# Patient Record
Sex: MALE | Race: WHITE | Employment: OTHER | URBAN - METROPOLITAN AREA
[De-identification: names, ages, dates, MRNs, and addresses within clinical notes are randomized per-mention and may not be internally consistent; named-entity substitution may affect disease eponyms.]

---

## 2017-03-09 ENCOUNTER — OFFICE VISIT (OUTPATIENT)
Dept: CARDIOLOGY CLINIC | Age: 82
End: 2017-03-09

## 2017-03-09 VITALS
SYSTOLIC BLOOD PRESSURE: 138 MMHG | BODY MASS INDEX: 34.19 KG/M2 | HEIGHT: 73 IN | HEART RATE: 49 BPM | WEIGHT: 258 LBS | DIASTOLIC BLOOD PRESSURE: 74 MMHG

## 2017-03-09 DIAGNOSIS — I35.1 NONRHEUMATIC AORTIC VALVE INSUFFICIENCY: ICD-10-CM

## 2017-03-09 DIAGNOSIS — I10 ESSENTIAL HYPERTENSION: ICD-10-CM

## 2017-03-09 DIAGNOSIS — I25.10 CORONARY ARTERY DISEASE INVOLVING NATIVE CORONARY ARTERY OF NATIVE HEART WITHOUT ANGINA PECTORIS: Primary | ICD-10-CM

## 2017-03-09 PROCEDURE — G8417 CALC BMI ABV UP PARAM F/U: HCPCS | Performed by: INTERNAL MEDICINE

## 2017-03-09 PROCEDURE — 4040F PNEUMOC VAC/ADMIN/RCVD: CPT | Performed by: INTERNAL MEDICINE

## 2017-03-09 PROCEDURE — 99214 OFFICE O/P EST MOD 30 MIN: CPT | Performed by: INTERNAL MEDICINE

## 2017-03-09 PROCEDURE — 1036F TOBACCO NON-USER: CPT | Performed by: INTERNAL MEDICINE

## 2017-03-09 PROCEDURE — 1123F ACP DISCUSS/DSCN MKR DOCD: CPT | Performed by: INTERNAL MEDICINE

## 2017-03-09 PROCEDURE — G8598 ASA/ANTIPLAT THER USED: HCPCS | Performed by: INTERNAL MEDICINE

## 2017-03-09 PROCEDURE — G8484 FLU IMMUNIZE NO ADMIN: HCPCS | Performed by: INTERNAL MEDICINE

## 2017-03-09 PROCEDURE — G8427 DOCREV CUR MEDS BY ELIG CLIN: HCPCS | Performed by: INTERNAL MEDICINE

## 2017-03-09 RX ORDER — DOXYCYCLINE HYCLATE 100 MG/1
CAPSULE ORAL
Refills: 2 | COMMUNITY
Start: 2017-02-25 | End: 2018-11-29 | Stop reason: ALTCHOICE

## 2017-03-16 ENCOUNTER — OFFICE VISIT (OUTPATIENT)
Dept: INTERNAL MEDICINE CLINIC | Age: 82
End: 2017-03-16

## 2017-03-16 VITALS
WEIGHT: 257 LBS | HEIGHT: 73 IN | BODY MASS INDEX: 34.06 KG/M2 | SYSTOLIC BLOOD PRESSURE: 126 MMHG | DIASTOLIC BLOOD PRESSURE: 84 MMHG | HEART RATE: 72 BPM

## 2017-03-16 DIAGNOSIS — I25.10 CORONARY ARTERY DISEASE INVOLVING NATIVE CORONARY ARTERY OF NATIVE HEART WITHOUT ANGINA PECTORIS: ICD-10-CM

## 2017-03-16 DIAGNOSIS — C61 MALIGNANT NEOPLASM OF PROSTATE (HCC): ICD-10-CM

## 2017-03-16 DIAGNOSIS — I10 ESSENTIAL HYPERTENSION: Primary | ICD-10-CM

## 2017-03-16 DIAGNOSIS — E78.00 PURE HYPERCHOLESTEROLEMIA: ICD-10-CM

## 2017-03-16 PROCEDURE — 1036F TOBACCO NON-USER: CPT | Performed by: NURSE PRACTITIONER

## 2017-03-16 PROCEDURE — 4040F PNEUMOC VAC/ADMIN/RCVD: CPT | Performed by: NURSE PRACTITIONER

## 2017-03-16 PROCEDURE — G8484 FLU IMMUNIZE NO ADMIN: HCPCS | Performed by: NURSE PRACTITIONER

## 2017-03-16 PROCEDURE — 1123F ACP DISCUSS/DSCN MKR DOCD: CPT | Performed by: NURSE PRACTITIONER

## 2017-03-16 PROCEDURE — G8427 DOCREV CUR MEDS BY ELIG CLIN: HCPCS | Performed by: NURSE PRACTITIONER

## 2017-03-16 PROCEDURE — G8598 ASA/ANTIPLAT THER USED: HCPCS | Performed by: NURSE PRACTITIONER

## 2017-03-16 PROCEDURE — 99214 OFFICE O/P EST MOD 30 MIN: CPT | Performed by: NURSE PRACTITIONER

## 2017-03-16 PROCEDURE — G8417 CALC BMI ABV UP PARAM F/U: HCPCS | Performed by: NURSE PRACTITIONER

## 2017-07-14 RX ORDER — FUROSEMIDE 20 MG/1
TABLET ORAL
Qty: 90 TABLET | Refills: 0 | Status: SHIPPED | OUTPATIENT
Start: 2017-07-14 | End: 2017-10-13 | Stop reason: SDUPTHER

## 2017-07-31 RX ORDER — POTASSIUM CHLORIDE 750 MG/1
CAPSULE, EXTENDED RELEASE ORAL
Qty: 90 CAPSULE | Refills: 0 | Status: SHIPPED | OUTPATIENT
Start: 2017-07-31 | End: 2017-10-27 | Stop reason: SDUPTHER

## 2017-08-10 RX ORDER — LEVOTHYROXINE SODIUM 0.05 MG/1
TABLET ORAL
Qty: 90 TABLET | Refills: 0 | Status: SHIPPED | OUTPATIENT
Start: 2017-08-10 | End: 2017-11-08 | Stop reason: SDUPTHER

## 2017-08-23 ENCOUNTER — OFFICE VISIT (OUTPATIENT)
Dept: INTERNAL MEDICINE CLINIC | Age: 82
End: 2017-08-23

## 2017-08-23 VITALS
HEIGHT: 73 IN | WEIGHT: 244 LBS | SYSTOLIC BLOOD PRESSURE: 130 MMHG | DIASTOLIC BLOOD PRESSURE: 82 MMHG | HEART RATE: 56 BPM | BODY MASS INDEX: 32.34 KG/M2

## 2017-08-23 DIAGNOSIS — Z86.39 HISTORY OF HYPOKALEMIA: ICD-10-CM

## 2017-08-23 DIAGNOSIS — R25.1 TREMOR: ICD-10-CM

## 2017-08-23 DIAGNOSIS — I25.10 CORONARY ARTERY DISEASE INVOLVING NATIVE CORONARY ARTERY OF NATIVE HEART WITHOUT ANGINA PECTORIS: ICD-10-CM

## 2017-08-23 DIAGNOSIS — I48.0 PAF (PAROXYSMAL ATRIAL FIBRILLATION) (HCC): ICD-10-CM

## 2017-08-23 DIAGNOSIS — I10 ESSENTIAL HYPERTENSION: ICD-10-CM

## 2017-08-23 DIAGNOSIS — G47.62 NOCTURNAL LEG CRAMPS: Primary | ICD-10-CM

## 2017-08-23 LAB
A/G RATIO: 2.2 (ref 1.1–2.2)
ALBUMIN SERPL-MCNC: 4.3 G/DL (ref 3.4–5)
ALP BLD-CCNC: 49 U/L (ref 40–129)
ALT SERPL-CCNC: 18 U/L (ref 10–40)
ANION GAP SERPL CALCULATED.3IONS-SCNC: 13 MMOL/L (ref 3–16)
AST SERPL-CCNC: 19 U/L (ref 15–37)
BASOPHILS ABSOLUTE: 0 K/UL (ref 0–0.2)
BASOPHILS RELATIVE PERCENT: 0.6 %
BILIRUB SERPL-MCNC: 0.6 MG/DL (ref 0–1)
BUN BLDV-MCNC: 25 MG/DL (ref 7–20)
CALCIUM SERPL-MCNC: 9.3 MG/DL (ref 8.3–10.6)
CHLORIDE BLD-SCNC: 104 MMOL/L (ref 99–110)
CHOLESTEROL, TOTAL: 114 MG/DL (ref 0–199)
CO2: 27 MMOL/L (ref 21–32)
CREAT SERPL-MCNC: 1.1 MG/DL (ref 0.8–1.3)
EOSINOPHILS ABSOLUTE: 0.2 K/UL (ref 0–0.6)
EOSINOPHILS RELATIVE PERCENT: 2.6 %
GFR AFRICAN AMERICAN: >60
GFR NON-AFRICAN AMERICAN: >60
GLOBULIN: 2 G/DL
GLUCOSE BLD-MCNC: 92 MG/DL (ref 70–99)
HCT VFR BLD CALC: 47.8 % (ref 40.5–52.5)
HDLC SERPL-MCNC: 31 MG/DL (ref 40–60)
HEMOGLOBIN: 15.7 G/DL (ref 13.5–17.5)
LDL CHOLESTEROL CALCULATED: 45 MG/DL
LYMPHOCYTES ABSOLUTE: 1.7 K/UL (ref 1–5.1)
LYMPHOCYTES RELATIVE PERCENT: 23.3 %
MCH RBC QN AUTO: 30 PG (ref 26–34)
MCHC RBC AUTO-ENTMCNC: 32.9 G/DL (ref 31–36)
MCV RBC AUTO: 91.3 FL (ref 80–100)
MONOCYTES ABSOLUTE: 0.8 K/UL (ref 0–1.3)
MONOCYTES RELATIVE PERCENT: 10.4 %
NEUTROPHILS ABSOLUTE: 4.7 K/UL (ref 1.7–7.7)
NEUTROPHILS RELATIVE PERCENT: 63.1 %
PDW BLD-RTO: 14.3 % (ref 12.4–15.4)
PLATELET # BLD: 125 K/UL (ref 135–450)
PMV BLD AUTO: 8.1 FL (ref 5–10.5)
POTASSIUM SERPL-SCNC: 4.2 MMOL/L (ref 3.5–5.1)
RBC # BLD: 5.24 M/UL (ref 4.2–5.9)
SODIUM BLD-SCNC: 144 MMOL/L (ref 136–145)
TOTAL PROTEIN: 6.3 G/DL (ref 6.4–8.2)
TRIGL SERPL-MCNC: 190 MG/DL (ref 0–150)
VLDLC SERPL CALC-MCNC: 38 MG/DL
WBC # BLD: 7.5 K/UL (ref 4–11)

## 2017-08-23 PROCEDURE — 1036F TOBACCO NON-USER: CPT | Performed by: NURSE PRACTITIONER

## 2017-08-23 PROCEDURE — G8598 ASA/ANTIPLAT THER USED: HCPCS | Performed by: NURSE PRACTITIONER

## 2017-08-23 PROCEDURE — G8417 CALC BMI ABV UP PARAM F/U: HCPCS | Performed by: NURSE PRACTITIONER

## 2017-08-23 PROCEDURE — 99214 OFFICE O/P EST MOD 30 MIN: CPT | Performed by: NURSE PRACTITIONER

## 2017-08-23 PROCEDURE — 4040F PNEUMOC VAC/ADMIN/RCVD: CPT | Performed by: NURSE PRACTITIONER

## 2017-08-23 PROCEDURE — G8427 DOCREV CUR MEDS BY ELIG CLIN: HCPCS | Performed by: NURSE PRACTITIONER

## 2017-08-23 PROCEDURE — 1123F ACP DISCUSS/DSCN MKR DOCD: CPT | Performed by: NURSE PRACTITIONER

## 2017-09-07 RX ORDER — RIVASTIGMINE TARTRATE 1.5 MG/1
CAPSULE ORAL
Qty: 180 CAPSULE | Refills: 0 | Status: SHIPPED | OUTPATIENT
Start: 2017-09-07 | End: 2018-11-29 | Stop reason: ALTCHOICE

## 2017-09-15 ENCOUNTER — OFFICE VISIT (OUTPATIENT)
Dept: CARDIOLOGY CLINIC | Age: 82
End: 2017-09-15

## 2017-09-15 VITALS
HEIGHT: 73 IN | HEART RATE: 84 BPM | BODY MASS INDEX: 32.47 KG/M2 | DIASTOLIC BLOOD PRESSURE: 86 MMHG | WEIGHT: 245 LBS | SYSTOLIC BLOOD PRESSURE: 124 MMHG

## 2017-09-15 DIAGNOSIS — E66.9 OBESITY (BMI 30-39.9): ICD-10-CM

## 2017-09-15 DIAGNOSIS — I35.1 NONRHEUMATIC AORTIC VALVE INSUFFICIENCY: ICD-10-CM

## 2017-09-15 DIAGNOSIS — I10 ESSENTIAL HYPERTENSION: ICD-10-CM

## 2017-09-15 DIAGNOSIS — I25.10 CORONARY ARTERY DISEASE INVOLVING NATIVE CORONARY ARTERY OF NATIVE HEART WITHOUT ANGINA PECTORIS: Primary | ICD-10-CM

## 2017-09-15 PROCEDURE — 99214 OFFICE O/P EST MOD 30 MIN: CPT | Performed by: INTERNAL MEDICINE

## 2017-09-15 PROCEDURE — G8598 ASA/ANTIPLAT THER USED: HCPCS | Performed by: INTERNAL MEDICINE

## 2017-09-15 PROCEDURE — G8427 DOCREV CUR MEDS BY ELIG CLIN: HCPCS | Performed by: INTERNAL MEDICINE

## 2017-09-15 PROCEDURE — 1123F ACP DISCUSS/DSCN MKR DOCD: CPT | Performed by: INTERNAL MEDICINE

## 2017-09-15 PROCEDURE — 1036F TOBACCO NON-USER: CPT | Performed by: INTERNAL MEDICINE

## 2017-09-15 PROCEDURE — G8417 CALC BMI ABV UP PARAM F/U: HCPCS | Performed by: INTERNAL MEDICINE

## 2017-09-15 PROCEDURE — 4040F PNEUMOC VAC/ADMIN/RCVD: CPT | Performed by: INTERNAL MEDICINE

## 2017-09-21 ENCOUNTER — IMMUNIZATION (OUTPATIENT)
Dept: INTERNAL MEDICINE CLINIC | Age: 82
End: 2017-09-21

## 2017-09-21 DIAGNOSIS — Z23 NEED FOR INFLUENZA VACCINATION: Primary | ICD-10-CM

## 2017-09-21 PROCEDURE — 90662 IIV NO PRSV INCREASED AG IM: CPT | Performed by: NURSE PRACTITIONER

## 2017-09-21 PROCEDURE — G0008 ADMIN INFLUENZA VIRUS VAC: HCPCS | Performed by: NURSE PRACTITIONER

## 2017-10-16 RX ORDER — FUROSEMIDE 20 MG/1
TABLET ORAL
Qty: 90 TABLET | Refills: 0 | Status: SHIPPED | OUTPATIENT
Start: 2017-10-16 | End: 2018-02-02 | Stop reason: SDUPTHER

## 2017-10-27 RX ORDER — POTASSIUM CHLORIDE 750 MG/1
CAPSULE, EXTENDED RELEASE ORAL
Qty: 90 CAPSULE | Refills: 0 | Status: SHIPPED | OUTPATIENT
Start: 2017-10-27 | End: 2018-01-24 | Stop reason: SDUPTHER

## 2017-11-08 ENCOUNTER — OFFICE VISIT (OUTPATIENT)
Dept: INTERNAL MEDICINE CLINIC | Age: 82
End: 2017-11-08

## 2017-11-08 VITALS
SYSTOLIC BLOOD PRESSURE: 132 MMHG | HEART RATE: 52 BPM | HEIGHT: 73 IN | DIASTOLIC BLOOD PRESSURE: 80 MMHG | BODY MASS INDEX: 33 KG/M2 | WEIGHT: 249 LBS

## 2017-11-08 DIAGNOSIS — I10 ESSENTIAL HYPERTENSION: ICD-10-CM

## 2017-11-08 DIAGNOSIS — C61 MALIGNANT NEOPLASM OF PROSTATE (HCC): ICD-10-CM

## 2017-11-08 DIAGNOSIS — R53.83 FATIGUE, UNSPECIFIED TYPE: Primary | ICD-10-CM

## 2017-11-08 DIAGNOSIS — E55.9 VITAMIN D DEFICIENCY: ICD-10-CM

## 2017-11-08 DIAGNOSIS — Z23 NEED FOR PNEUMOCOCCAL VACCINATION: ICD-10-CM

## 2017-11-08 DIAGNOSIS — F03.90 DEMENTIA WITHOUT BEHAVIORAL DISTURBANCE, UNSPECIFIED DEMENTIA TYPE: ICD-10-CM

## 2017-11-08 LAB
FOLATE: >20 NG/ML (ref 4.78–24.2)
PROSTATE SPECIFIC ANTIGEN: 0.48 NG/ML (ref 0–4)
TSH SERPL DL<=0.05 MIU/L-ACNC: 3.13 UIU/ML (ref 0.27–4.2)
VITAMIN B-12: 855 PG/ML (ref 211–911)
VITAMIN D 25-HYDROXY: 27.1 NG/ML

## 2017-11-08 PROCEDURE — 4040F PNEUMOC VAC/ADMIN/RCVD: CPT | Performed by: NURSE PRACTITIONER

## 2017-11-08 PROCEDURE — 1036F TOBACCO NON-USER: CPT | Performed by: NURSE PRACTITIONER

## 2017-11-08 PROCEDURE — G0009 ADMIN PNEUMOCOCCAL VACCINE: HCPCS | Performed by: NURSE PRACTITIONER

## 2017-11-08 PROCEDURE — G8417 CALC BMI ABV UP PARAM F/U: HCPCS | Performed by: NURSE PRACTITIONER

## 2017-11-08 PROCEDURE — G8598 ASA/ANTIPLAT THER USED: HCPCS | Performed by: NURSE PRACTITIONER

## 2017-11-08 PROCEDURE — 1123F ACP DISCUSS/DSCN MKR DOCD: CPT | Performed by: NURSE PRACTITIONER

## 2017-11-08 PROCEDURE — G8427 DOCREV CUR MEDS BY ELIG CLIN: HCPCS | Performed by: NURSE PRACTITIONER

## 2017-11-08 PROCEDURE — 90732 PPSV23 VACC 2 YRS+ SUBQ/IM: CPT | Performed by: NURSE PRACTITIONER

## 2017-11-08 PROCEDURE — 99214 OFFICE O/P EST MOD 30 MIN: CPT | Performed by: NURSE PRACTITIONER

## 2017-11-08 PROCEDURE — G8484 FLU IMMUNIZE NO ADMIN: HCPCS | Performed by: NURSE PRACTITIONER

## 2017-11-08 RX ORDER — DONEPEZIL HYDROCHLORIDE 5 MG/1
5 TABLET, FILM COATED ORAL NIGHTLY
Qty: 90 TABLET | Refills: 3 | Status: SHIPPED | OUTPATIENT
Start: 2017-11-08 | End: 2018-11-29 | Stop reason: SDUPTHER

## 2017-11-09 RX ORDER — LEVOTHYROXINE SODIUM 0.05 MG/1
TABLET ORAL
Qty: 90 TABLET | Refills: 0 | Status: SHIPPED | OUTPATIENT
Start: 2017-11-09 | End: 2018-02-04 | Stop reason: SDUPTHER

## 2017-11-09 NOTE — PROGRESS NOTES
Subjective:      Patient ID: Juarez Brown is a 80 y.o. male. CC: Follow-up chronic medication conditions, hypertension, CAD     HPI: The patient present to the office today for follow-up of chronic medical conditions. He has no acute complaints today and he generally feels good. He has history of hypertension, coronary disease, high cholesterol. He denies any chest pain. He is taking his medication as directed. He has a history of tremor. He uses a beta blocker for this. He questions his previous diagnosis of memory impairment. He feels this was related to his heart attack and not a chronic memory issue. We discussed this but he is going to continue Exelon patches for now. He received a letter from his insurance company that Exelon patches will be switched to tier 4. He is interested in alternative.       Past Medical History:   Diagnosis Date    Bilateral cataracts     Bladder stones     Blepharitis     Cancer ([facility] [address]     prostate    Hypertension     Kidney stones     with bladder stones    Obesity     Pneumonia     SOB (shortness of breath)        Current Outpatient Prescriptions   Medication Sig Dispense Refill    donepezil (ARICEPT) 5 MG tablet Take 1 tablet by mouth nightly 90 tablet 3    potassium chloride (MICRO-K) 10 MEQ extended release capsule TAKE 1 CAPSULE BY MOUTH DAILY 90 capsule 0    furosemide (LASIX) 20 MG tablet TAKE 1 TABLET BY MOUTH EVERY DAY 90 tablet 0    rivastigmine (EXELON) 1.5 MG capsule TAKE 1 CAPSULE BY MOUTH TWICE DAILY 180 capsule 0    doxycycline hyclate (VIBRAMYCIN) 100 MG capsule TK 1 C PO BID  2    propranolol (INDERAL) 20 MG tablet Take 1 tablet by mouth 2 times daily (Patient taking differently: Take 40 mg by mouth 2 times daily ) 180 tablet 3    tobramycin-dexamethasone (TOBRADEX) 0.3-0.1 % ophthalmic ointment 3 times daily 3 times daily.  Coenzyme Q10 (CO Q 10 PO) Take  by mouth.       aspirin 81 MG tablet Take 81 mg by mouth request  -     donepezil (ARICEPT) 5 MG tablet;  Take 1 tablet by mouth nightly  -     VITAMIN B12 & FOLATE  -     Vitamin D 25 Hydroxy    Malignant neoplasm of prostate ([address]  - He wants to continue these.  -     PSA, Prostatic Specific Antigen    Vitamin D deficiency   -     Vitamin D 25 Hydroxy    Need for pneumococcal vaccination  -     Pneumococcal polysaccharide vaccine 23-valent >= 3yo subcutaneous/IM (PNEUMOVAX 23)    Follow-up 4 months      Larissa Fairbanks, CNP

## 2017-11-13 DIAGNOSIS — E55.9 VITAMIN D DEFICIENCY: Primary | ICD-10-CM

## 2018-01-11 RX ORDER — PROPRANOLOL HYDROCHLORIDE 20 MG/1
TABLET ORAL
Qty: 180 TABLET | Refills: 0 | Status: SHIPPED | OUTPATIENT
Start: 2018-01-11 | End: 2018-03-08 | Stop reason: SDUPTHER

## 2018-01-16 ENCOUNTER — TELEPHONE (OUTPATIENT)
Dept: CARDIOLOGY CLINIC | Age: 83
End: 2018-01-16

## 2018-01-16 NOTE — TELEPHONE ENCOUNTER
Last seen 9/15/17. Hx of CAD with abnormal stress test.  Refuses LHC. Patient takes ASA 81 mg daily.   Fish oil tabs not on current medication list.

## 2018-01-24 RX ORDER — POTASSIUM CHLORIDE 750 MG/1
CAPSULE, EXTENDED RELEASE ORAL
Qty: 90 CAPSULE | Refills: 0 | Status: SHIPPED | OUTPATIENT
Start: 2018-01-24 | End: 2018-04-26 | Stop reason: SDUPTHER

## 2018-01-31 ENCOUNTER — TELEPHONE (OUTPATIENT)
Dept: INTERNAL MEDICINE CLINIC | Age: 83
End: 2018-01-31

## 2018-01-31 NOTE — TELEPHONE ENCOUNTER
Phone call form patient's wife. She states that patient's urine if very cloudy and wants it tested for UTI. She says he hs no other symptoms and no unusual behavior. She say he recently had surgery and is probably not drinking enough fluids. Wanted Kenny Flor to give order for U/A. He can come into office to give specimen.

## 2018-02-01 ENCOUNTER — NURSE ONLY (OUTPATIENT)
Dept: INTERNAL MEDICINE CLINIC | Age: 83
End: 2018-02-01

## 2018-02-01 DIAGNOSIS — R82.90 CLOUDY URINE: Primary | ICD-10-CM

## 2018-02-01 LAB
BILIRUBIN, POC: NORMAL
BLOOD URINE, POC: NORMAL
CLARITY, POC: NORMAL
COLOR, POC: NORMAL
GLUCOSE URINE, POC: NORMAL
KETONES, POC: NORMAL
LEUKOCYTE EST, POC: NORMAL
NITRITE, POC: NORMAL
PH, POC: 5
PROTEIN, POC: NORMAL
SPECIFIC GRAVITY, POC: 1.02
UROBILINOGEN, POC: 0.2

## 2018-02-01 PROCEDURE — 81002 URINALYSIS NONAUTO W/O SCOPE: CPT | Performed by: NURSE PRACTITIONER

## 2018-02-02 RX ORDER — FUROSEMIDE 20 MG/1
TABLET ORAL
Qty: 90 TABLET | Refills: 0 | Status: SHIPPED | OUTPATIENT
Start: 2018-02-02 | End: 2018-04-26 | Stop reason: SDUPTHER

## 2018-02-05 RX ORDER — LEVOTHYROXINE SODIUM 0.05 MG/1
TABLET ORAL
Qty: 90 TABLET | Refills: 0 | Status: SHIPPED | OUTPATIENT
Start: 2018-02-05 | End: 2018-05-03 | Stop reason: SDUPTHER

## 2018-03-07 NOTE — PROGRESS NOTES
(MICRO-K) 10 MEQ extended release capsule TAKE 1 CAPSULE BY MOUTH DAILY 90 capsule 0    propranolol (INDERAL) 20 MG tablet TAKE 1 TABLET BY MOUTH TWICE DAILY 180 tablet 0    vitamin D (CHOLECALCIFEROL) 1000 units TABS tablet Take 1 tablet by mouth daily 30 tablet 0    donepezil (ARICEPT) 5 MG tablet Take 1 tablet by mouth nightly 90 tablet 3    rivastigmine (EXELON) 1.5 MG capsule TAKE 1 CAPSULE BY MOUTH TWICE DAILY 180 capsule 0    doxycycline hyclate (VIBRAMYCIN) 100 MG capsule TK 1 C PO BID  2    tobramycin-dexamethasone (TOBRADEX) 0.3-0.1 % ophthalmic ointment 3 times daily 3 times daily.  Coenzyme Q10 (CO Q 10 PO) Take  by mouth.  aspirin 81 MG tablet Take 81 mg by mouth daily.  multivitamin (ANTIOXIDANT;PROSIGHT) TABS per tablet Take 1 tablet by mouth daily. No current facility-administered medications for this visit.       Reviewed with patient and will remain unchanged except as mentioned in A/P  PHYSICAL EXAM     Vitals:    03/08/18 1013   BP: 136/78   Pulse: (!) 48      Gen Alert, coop, no distress Heart  RRR, no MRG, nl apic impulse   Head NC, AT, no abnorm Abd  Soft, NT, +BS, no mass, no OM   Eyes PERRLA, conj/corn clear Ext  Ext nl, AT, no C/C/E   Nose Nares nl, no drain, NT Pulse 2+ and symmetric   Throat Lips, mucosa, tongue nl Skin Color/text/turg nl, no rash/lesions   Neck S/S, TM, NT, no bruit/JVD Psych Nl mood and affect   Lung CTA-B, unlabored, no DTP Lymph   No cervical or axillary LA   Ch wall NT, no deform Neuro  Nl gross M/S exam     ASSESSMENT AND PLAN       ~CAD   Date EF Results   Sx   No concerning   NYH   []I         [x]II           []III          []IV   C   Patient declined   MPI 3/12  Mixed inferior I/S   TTE 8/16 55% Mild AI, mild to mod TR   Plan   Continue aggressive medical treatment at doses above  Refuses heart cath for abnormal stress   ~AI   Date EF Detail   Sx   No concerning   TTE 8/16 55% Mild AI   Plan   Continued observation   ~HTN  Today BP [x] Controlled [] Borderline [] Uncontrolled   Counseling [x] Diet/Salt [x] Exercise [x] Weight    Plan Decrease propranolol to 20mg daily with bradycardia, take at night.   ~Obesity  BMI [] 25-29.9 [x] >30     Counseling [x] Diet [x] Exercise [x] Surgery   Plan Discussed lifestyle changes and spectrum of weight loss options in detail  ~Followup  []2 wk   []1m   []3m    [x]6m   []12m    []PRN  []Other:

## 2018-03-08 ENCOUNTER — OFFICE VISIT (OUTPATIENT)
Dept: CARDIOLOGY CLINIC | Age: 83
End: 2018-03-08

## 2018-03-08 VITALS
SYSTOLIC BLOOD PRESSURE: 136 MMHG | DIASTOLIC BLOOD PRESSURE: 78 MMHG | HEART RATE: 48 BPM | BODY MASS INDEX: 30.48 KG/M2 | HEIGHT: 73 IN | WEIGHT: 230 LBS

## 2018-03-08 DIAGNOSIS — I25.10 CORONARY ARTERY DISEASE INVOLVING NATIVE CORONARY ARTERY OF NATIVE HEART WITHOUT ANGINA PECTORIS: Primary | ICD-10-CM

## 2018-03-08 DIAGNOSIS — E66.9 OBESITY (BMI 30-39.9): ICD-10-CM

## 2018-03-08 DIAGNOSIS — I35.1 NONRHEUMATIC AORTIC VALVE INSUFFICIENCY: ICD-10-CM

## 2018-03-08 DIAGNOSIS — I48.91 ATRIAL FIBRILLATION, UNSPECIFIED TYPE (HCC): ICD-10-CM

## 2018-03-08 DIAGNOSIS — I10 ESSENTIAL HYPERTENSION: ICD-10-CM

## 2018-03-08 PROCEDURE — G8417 CALC BMI ABV UP PARAM F/U: HCPCS | Performed by: INTERNAL MEDICINE

## 2018-03-08 PROCEDURE — 4040F PNEUMOC VAC/ADMIN/RCVD: CPT | Performed by: INTERNAL MEDICINE

## 2018-03-08 PROCEDURE — 99214 OFFICE O/P EST MOD 30 MIN: CPT | Performed by: INTERNAL MEDICINE

## 2018-03-08 PROCEDURE — G8427 DOCREV CUR MEDS BY ELIG CLIN: HCPCS | Performed by: INTERNAL MEDICINE

## 2018-03-08 PROCEDURE — 93000 ELECTROCARDIOGRAM COMPLETE: CPT | Performed by: INTERNAL MEDICINE

## 2018-03-08 PROCEDURE — G8484 FLU IMMUNIZE NO ADMIN: HCPCS | Performed by: INTERNAL MEDICINE

## 2018-03-08 PROCEDURE — 1036F TOBACCO NON-USER: CPT | Performed by: INTERNAL MEDICINE

## 2018-03-08 PROCEDURE — 1123F ACP DISCUSS/DSCN MKR DOCD: CPT | Performed by: INTERNAL MEDICINE

## 2018-03-08 PROCEDURE — G8598 ASA/ANTIPLAT THER USED: HCPCS | Performed by: INTERNAL MEDICINE

## 2018-03-08 RX ORDER — CHLORAL HYDRATE 500 MG
3000 CAPSULE ORAL 3 TIMES DAILY
COMMUNITY
End: 2019-09-19

## 2018-03-08 RX ORDER — PROPRANOLOL HYDROCHLORIDE 20 MG/1
20 TABLET ORAL DAILY
Qty: 90 TABLET | Refills: 3 | Status: SHIPPED | OUTPATIENT
Start: 2018-03-08 | End: 2019-05-06 | Stop reason: SDUPTHER

## 2018-03-15 ENCOUNTER — OFFICE VISIT (OUTPATIENT)
Dept: INTERNAL MEDICINE CLINIC | Age: 83
End: 2018-03-15

## 2018-03-15 VITALS
SYSTOLIC BLOOD PRESSURE: 130 MMHG | HEIGHT: 73 IN | DIASTOLIC BLOOD PRESSURE: 84 MMHG | BODY MASS INDEX: 32.74 KG/M2 | HEART RATE: 56 BPM | WEIGHT: 247 LBS

## 2018-03-15 DIAGNOSIS — I25.10 CORONARY ARTERY DISEASE INVOLVING NATIVE CORONARY ARTERY OF NATIVE HEART WITHOUT ANGINA PECTORIS: ICD-10-CM

## 2018-03-15 DIAGNOSIS — Z23 NEED FOR SHINGLES VACCINE: ICD-10-CM

## 2018-03-15 DIAGNOSIS — I70.219 ATHEROSCLEROSIS OF NATIVE ARTERY OF LOWER EXTREMITY WITH INTERMITTENT CLAUDICATION, UNSPECIFIED LATERALITY (HCC): ICD-10-CM

## 2018-03-15 DIAGNOSIS — I10 ESSENTIAL HYPERTENSION: Primary | ICD-10-CM

## 2018-03-15 PROCEDURE — 4040F PNEUMOC VAC/ADMIN/RCVD: CPT | Performed by: NURSE PRACTITIONER

## 2018-03-15 PROCEDURE — G8427 DOCREV CUR MEDS BY ELIG CLIN: HCPCS | Performed by: NURSE PRACTITIONER

## 2018-03-15 PROCEDURE — 99213 OFFICE O/P EST LOW 20 MIN: CPT | Performed by: NURSE PRACTITIONER

## 2018-03-15 PROCEDURE — G8598 ASA/ANTIPLAT THER USED: HCPCS | Performed by: NURSE PRACTITIONER

## 2018-03-15 PROCEDURE — 1036F TOBACCO NON-USER: CPT | Performed by: NURSE PRACTITIONER

## 2018-03-15 PROCEDURE — 1123F ACP DISCUSS/DSCN MKR DOCD: CPT | Performed by: NURSE PRACTITIONER

## 2018-03-15 PROCEDURE — G8482 FLU IMMUNIZE ORDER/ADMIN: HCPCS | Performed by: NURSE PRACTITIONER

## 2018-03-15 PROCEDURE — G8417 CALC BMI ABV UP PARAM F/U: HCPCS | Performed by: NURSE PRACTITIONER

## 2018-03-15 ASSESSMENT — ENCOUNTER SYMPTOMS
SHORTNESS OF BREATH: 0
GASTROINTESTINAL NEGATIVE: 1

## 2018-03-16 NOTE — PROGRESS NOTES
SUBJECTIVE:    Patient ID: Enrique Marquez is a 80 y.o. male. CC: Hypertension, CAD, tremor    HPI: The patient presents to the office today for follow-up of chronic medical conditions. He has no acute complaints today and he generally feels good.       He has history of hypertension, coronary disease, high cholesterol. He denies any chest pain. He is taking his medication as directed.     He has a history of tremor. He uses a beta blocker for this.       Feels his memory disorder stable      Past Medical History:   Diagnosis Date    Bilateral cataracts     Bladder stones     Blepharitis     Cancer ([facility] [address]     prostate    Hypertension     Kidney stones     with bladder stones    Obesity     Pneumonia     SOB (shortness of breath)         Current Outpatient Prescriptions   Medication Sig Dispense Refill    zoster recombinant adjuvanted vaccine (SHINGRIX) 50 MCG SUSR injection Inject 0.5 mLs into the muscle every 3 months for 2 doses 0.5 mL 1    Omega-3 Fatty Acids (FISH OIL) 1000 MG CAPS Take 3,000 mg by mouth 3 times daily      propranolol (INDERAL) 20 MG tablet Take 1 tablet by mouth daily 90 tablet 3    levothyroxine (SYNTHROID) 50 MCG tablet TAKE 1 TABLET BY MOUTH EVERY DAY 90 tablet 0    furosemide (LASIX) 20 MG tablet TAKE 1 TABLET BY MOUTH EVERY DAY 90 tablet 0    potassium chloride (MICRO-K) 10 MEQ extended release capsule TAKE 1 CAPSULE BY MOUTH DAILY 90 capsule 0    vitamin D (CHOLECALCIFEROL) 1000 units TABS tablet Take 1 tablet by mouth daily 30 tablet 0    donepezil (ARICEPT) 5 MG tablet Take 1 tablet by mouth nightly 90 tablet 3    rivastigmine (EXELON) 1.5 MG capsule TAKE 1 CAPSULE BY MOUTH TWICE DAILY 180 capsule 0    doxycycline hyclate (VIBRAMYCIN) 100 MG capsule TK 1 C PO BID  2    tobramycin-dexamethasone (TOBRADEX) 0.3-0.1 % ophthalmic ointment 3 times daily 3 times daily.  Coenzyme Q10 (CO Q 10 PO) Take  by mouth.       aspirin 81 MG tablet Take 81 mg by mouth

## 2018-04-26 RX ORDER — FUROSEMIDE 20 MG/1
TABLET ORAL
Qty: 90 TABLET | Refills: 0 | Status: SHIPPED | OUTPATIENT
Start: 2018-04-26 | End: 2018-07-18 | Stop reason: SDUPTHER

## 2018-04-26 RX ORDER — POTASSIUM CHLORIDE 750 MG/1
CAPSULE, EXTENDED RELEASE ORAL
Qty: 90 CAPSULE | Refills: 0 | Status: SHIPPED | OUTPATIENT
Start: 2018-04-26 | End: 2018-07-24 | Stop reason: SDUPTHER

## 2018-05-04 RX ORDER — LEVOTHYROXINE SODIUM 0.05 MG/1
TABLET ORAL
Qty: 90 TABLET | Refills: 1 | Status: SHIPPED | OUTPATIENT
Start: 2018-05-04 | End: 2018-10-31 | Stop reason: SDUPTHER

## 2018-06-27 ENCOUNTER — HOSPITAL ENCOUNTER (OUTPATIENT)
Dept: PHYSICAL THERAPY | Age: 83
Discharge: HOME | End: 2018-06-30
Admitting: PODIATRIST

## 2018-06-27 ASSESSMENT — PAIN DESCRIPTION - FREQUENCY: FREQUENCY: CONTINUOUS

## 2018-06-27 ASSESSMENT — PAIN DESCRIPTION - PAIN TYPE: TYPE: ACUTE PAIN;CHRONIC PAIN

## 2018-06-27 ASSESSMENT — PAIN DESCRIPTION - LOCATION: LOCATION: ANKLE;FOOT

## 2018-06-27 ASSESSMENT — PAIN SCALES - GENERAL: PAINLEVEL_OUTOF10: 4

## 2018-06-27 ASSESSMENT — PAIN DESCRIPTION - ORIENTATION: ORIENTATION: LEFT

## 2018-06-27 NOTE — FLOWSHEET NOTE
Tolerance:  [x] Patient tolerated treatment well [] Patient limited by fatigue  [] Patient limited by pain  [] Patient limited by other medical complications  [] Other:     Prognosis: [x] Good [] Fair  [] Poor    Patient Requires Follow-up: [x] Yes  [] No    Plan: 2x/wk for 5-6 wks  [] Continue per plan of care [] Alter current plan (see comments)  [x] Plan of care initiated [] Hold pending MD visit [] Discharge    Plan for Next Session:  See flow sheet for exercises. (flexibility, strength, balance)  Include manual therapy/ IASTM. May also try ultrasound or K-tape    Electronically signed by:   Marika Lamb PT

## 2018-06-27 NOTE — PROGRESS NOTES
sleeps and during the day as able. Pt states he doesn't tolerate the brace well and he has too much to take care of at home. CLOF:  Standing increases pain and can tolerate about an hour. Pt feels the pain makes him limp sometimes. He has to pay attention more when walking or sometimes he stumbles on L foot. No falls. Stairs in step to pattern. PLOF:  Used to be able to walk 2-3 miles a day up until when he had to care more for his wife. Was able to walk without pain  Pain Screening  Patient Currently in Pain: Yes  Pain Assessment  Pain Assessment: 0-10  Pain Level: 4  Pain Type: Acute pain;Chronic pain  Pain Location: Ankle; Foot  Pain Orientation: Left  Pain Descriptors: Tingling;Aching;Tightness;Cramping  Pain Frequency: Continuous  Vital Signs  Patient Currently in Pain: Yes      Social/Functional History  Social/Functional History  Lives With: Spouse  Home Layout: One level; Laundry in basement  Home Access: Stairs to enter without rails; Ramped entrance (2)  Home Equipment:  (walks without AD)  ADL Assistance: Independent  Homemaking Assistance: Independent  Ambulation Assistance: Independent  Transfer Assistance: Independent  Active : Yes  Mode of Transportation: Car  Occupation: Retired  Leisure & Hobbies: dog/cat shows ; geneology  Objective     Observation/Palpation  Posture: Fair  Palpation: Knots in L-calf with tenderness;  TTP L Achilles  Observation: increased pronation in L-foot    AROM RLE (degrees)  RLE General AROM: R ankle DF 5 deg with knee straight  AROM LLE (degrees)  LLE AROM : Exceptions  L Ankle Dorsiflexion 0-20: 1 deg with knee straight; 7 deg with knee bent  L Ankle Plantar Flexion 0-45: 40  L Ankle Forefoot Inversion 0-40: 45  L Ankle Forefoot Eversion 0-20: 30    Strength LLE  Strength LLE: Exception  L Knee Flexion: 4-/5 (cramped with contraction)  L Knee Extension: 4+/5  L Ankle Dorsiflexion: 4+/5  L Ankle Plantar Flexion: 3+/5  L Ankle Inversion: 4-/5  L Ankle Eversion: 4+/5  L Great Toe Extension: 4+/5  L Great Toe Flexion: 4/5     Additional Measures  Flexibility: sig tight HS/gastroc  Sensation  Overall Sensation Status: Impaired (neuropathy in feet)        Ambulation  Ambulation?: Yes  Ambulation 1  Surface: level tile  Device: No Device  Assistance: Independent  Quality of Gait: mild antalgia, shortened stance phase LLE, decreased push off L foot. Gait steady   Distance: 120 ft observed  Balance  Single Leg Stance R Le  Single Leg Stance L Le                         Assessment   Conditions Requiring Skilled Therapeutic Intervention  Body structures, Functions, Activity limitations: Decreased functional mobility ; Decreased ROM; Decreased strength;Decreased high-level IADLs;Decreased balance;Decreased endurance  Assessment: Pt presents with gradually worsening L-Achilles tendon pain over the past 4 months along with increased L calf cramps. He has poor flexibility and decreased strength of L gastroc/HS; also decreased proprioception/ balance in LLE. Pt should benefit from skilled PT to improve ambulation and reduce pain/tightness. Treatment Diagnosis: Achilles pain L ankle limiting standing/walking, poor flexibility of L gastroc/HS; weakness of gastroc/HS, decreased balance  Prognosis: Good  Decision Making: Low Complexity  REQUIRES PT FOLLOW UP: Yes  Activity Tolerance  Activity Tolerance: Patient Tolerated treatment well         Plan   Plan  Times per week: 2x/wk for 5 wks  Current Treatment Recommendations: Strengthening, ROM, Balance Training, Endurance Training, Neuromuscular Re-education, Manual Therapy - Soft Tissue Mobilization, Modalities, Home Exercise Program    G-Code  PT G-Codes  Functional Assessment Tool Used: PT assessment  Score: 30-40% limited  Functional Limitation: Mobility: Walking and moving around  Mobility: Walking and Moving Around Current Status ():  At least 20 percent but less than 40 percent impaired, limited or restricted  Mobility: Walking and Moving Around Goal Status (): At least 1 percent but less than 20 percent impaired, limited or restricted      Goals  Short term goals  Time Frame for Short term goals: 2-3 wks  Short term goal 1: Pt will be independent with stretches to improve gastroc and HS flexibility  Short term goal 2: Pt will note decreased frequency of calf cramps in LLE  Long term goals  Time Frame for Long term goals : BY D/C  Long term goal 1: Improve gastroc flexibility to allow for 10 deg of DF for improved heel strike and decreased tension on Achilles tendon  Long term goal 2:  Increase strength of L gastroc/soleus and HS to at least 4/5 to improve push off during gait   Long term goal 3: Pt will be able to tolerate at least 30-60 minutes of standing/ walking activity with Achilles pain </= to 2/10  Patient Goals   Patient goals : be able to walk without pain        Therapy Time   Individual Concurrent Group Co-treatment   Time In 1313         Time Out 1415         Minutes 62         Timed Code Treatment Minutes: 1200 [facility],

## 2018-07-01 ENCOUNTER — HOSPITAL ENCOUNTER (OUTPATIENT)
Dept: OTHER | Age: 83
Discharge: HOME | End: 2018-07-31
Attending: PODIATRIST | Admitting: PODIATRIST

## 2018-07-18 RX ORDER — FUROSEMIDE 20 MG/1
TABLET ORAL
Qty: 90 TABLET | Refills: 0 | Status: SHIPPED | OUTPATIENT
Start: 2018-07-18 | End: 2018-10-12 | Stop reason: SDUPTHER

## 2018-07-24 RX ORDER — POTASSIUM CHLORIDE 750 MG/1
CAPSULE, EXTENDED RELEASE ORAL
Qty: 90 CAPSULE | Refills: 1 | Status: SHIPPED | OUTPATIENT
Start: 2018-07-24 | End: 2019-01-14 | Stop reason: SDUPTHER

## 2018-07-26 ENCOUNTER — OFFICE VISIT (OUTPATIENT)
Dept: INTERNAL MEDICINE CLINIC | Age: 83
End: 2018-07-26

## 2018-07-26 VITALS
WEIGHT: 246 LBS | HEART RATE: 72 BPM | DIASTOLIC BLOOD PRESSURE: 82 MMHG | SYSTOLIC BLOOD PRESSURE: 132 MMHG | HEIGHT: 73 IN | BODY MASS INDEX: 32.6 KG/M2

## 2018-07-26 DIAGNOSIS — Z85.46 HISTORY OF PROSTATE CANCER: ICD-10-CM

## 2018-07-26 DIAGNOSIS — I10 ESSENTIAL HYPERTENSION: ICD-10-CM

## 2018-07-26 DIAGNOSIS — R05.9 COUGH: ICD-10-CM

## 2018-07-26 DIAGNOSIS — E55.9 VITAMIN D DEFICIENCY: ICD-10-CM

## 2018-07-26 DIAGNOSIS — R53.83 FATIGUE, UNSPECIFIED TYPE: Primary | ICD-10-CM

## 2018-07-26 LAB
A/G RATIO: 2.2 (ref 1.1–2.2)
ALBUMIN SERPL-MCNC: 4.3 G/DL (ref 3.4–5)
ALP BLD-CCNC: 50 U/L (ref 40–129)
ALT SERPL-CCNC: 18 U/L (ref 10–40)
ANION GAP SERPL CALCULATED.3IONS-SCNC: 14 MMOL/L (ref 3–16)
AST SERPL-CCNC: 20 U/L (ref 15–37)
BASOPHILS ABSOLUTE: 0 K/UL (ref 0–0.2)
BASOPHILS RELATIVE PERCENT: 0.5 %
BILIRUB SERPL-MCNC: 0.5 MG/DL (ref 0–1)
BUN BLDV-MCNC: 16 MG/DL (ref 7–20)
CALCIUM SERPL-MCNC: 9.5 MG/DL (ref 8.3–10.6)
CHLORIDE BLD-SCNC: 104 MMOL/L (ref 99–110)
CO2: 25 MMOL/L (ref 21–32)
CREAT SERPL-MCNC: 1.2 MG/DL (ref 0.8–1.3)
EOSINOPHILS ABSOLUTE: 0.1 K/UL (ref 0–0.6)
EOSINOPHILS RELATIVE PERCENT: 1.2 %
GFR AFRICAN AMERICAN: >60
GFR NON-AFRICAN AMERICAN: 58
GLOBULIN: 2 G/DL
GLUCOSE BLD-MCNC: 98 MG/DL (ref 70–99)
HCT VFR BLD CALC: 48.1 % (ref 40.5–52.5)
HEMOGLOBIN: 15.9 G/DL (ref 13.5–17.5)
LYMPHOCYTES ABSOLUTE: 1.8 K/UL (ref 1–5.1)
LYMPHOCYTES RELATIVE PERCENT: 20.9 %
MCH RBC QN AUTO: 30.4 PG (ref 26–34)
MCHC RBC AUTO-ENTMCNC: 33 G/DL (ref 31–36)
MCV RBC AUTO: 92.3 FL (ref 80–100)
MONOCYTES ABSOLUTE: 0.8 K/UL (ref 0–1.3)
MONOCYTES RELATIVE PERCENT: 9.3 %
NEUTROPHILS ABSOLUTE: 5.9 K/UL (ref 1.7–7.7)
NEUTROPHILS RELATIVE PERCENT: 68.1 %
PDW BLD-RTO: 15.6 % (ref 12.4–15.4)
PLATELET # BLD: 132 K/UL (ref 135–450)
PMV BLD AUTO: 8.5 FL (ref 5–10.5)
POTASSIUM SERPL-SCNC: 4.5 MMOL/L (ref 3.5–5.1)
PROSTATE SPECIFIC ANTIGEN: 0.55 NG/ML (ref 0–4)
RBC # BLD: 5.21 M/UL (ref 4.2–5.9)
SODIUM BLD-SCNC: 143 MMOL/L (ref 136–145)
TOTAL PROTEIN: 6.3 G/DL (ref 6.4–8.2)
VITAMIN D 25-HYDROXY: 41.7 NG/ML
WBC # BLD: 8.7 K/UL (ref 4–11)

## 2018-07-26 PROCEDURE — G8417 CALC BMI ABV UP PARAM F/U: HCPCS | Performed by: NURSE PRACTITIONER

## 2018-07-26 PROCEDURE — 4040F PNEUMOC VAC/ADMIN/RCVD: CPT | Performed by: NURSE PRACTITIONER

## 2018-07-26 PROCEDURE — 1123F ACP DISCUSS/DSCN MKR DOCD: CPT | Performed by: NURSE PRACTITIONER

## 2018-07-26 PROCEDURE — G8598 ASA/ANTIPLAT THER USED: HCPCS | Performed by: NURSE PRACTITIONER

## 2018-07-26 PROCEDURE — G8427 DOCREV CUR MEDS BY ELIG CLIN: HCPCS | Performed by: NURSE PRACTITIONER

## 2018-07-26 PROCEDURE — 99214 OFFICE O/P EST MOD 30 MIN: CPT | Performed by: NURSE PRACTITIONER

## 2018-07-26 PROCEDURE — 1101F PT FALLS ASSESS-DOCD LE1/YR: CPT | Performed by: NURSE PRACTITIONER

## 2018-07-26 PROCEDURE — 1036F TOBACCO NON-USER: CPT | Performed by: NURSE PRACTITIONER

## 2018-07-26 ASSESSMENT — PATIENT HEALTH QUESTIONNAIRE - PHQ9
2. FEELING DOWN, DEPRESSED OR HOPELESS: 0
SUM OF ALL RESPONSES TO PHQ QUESTIONS 1-9: 0
SUM OF ALL RESPONSES TO PHQ9 QUESTIONS 1 & 2: 0
1. LITTLE INTEREST OR PLEASURE IN DOING THINGS: 0

## 2018-07-26 NOTE — PROGRESS NOTES
SUBJECTIVE:    Patient ID: Yenifer Chairez is a 80 y.o. male. CC: Cough, fatigue, hypertension, history of prostate cancer    HPI: The patient presents to the office today for follow-up of chronic medical conditions. His wife also reports concerns about cough and fatigue. Cough: The patient's spouse reports that he is occasionally coughing. This has been present for about 5-6 months or longer. The patient reports this cough does not bother him. He reports it is nonproductive. He reports no unintentional weight loss or night sweats. Fatigue: The patient's spouse reports concerns about fatigue. Again, the patient reports this does not bother him. He feels his fatigue is related to heavy activity. He gets more tired when he works outdoors around the house. He contributes this to aging. He denies any other known causes for fatigue. He denies bleeding, he denies any presyncope. He denies any palpitations, shortness of breath, or chest pain. Hypertension: He denies any chest pain or shortness of breath. He is taking her medication as directed. Vitamin D: He has a history of vitamin D deficiency. He has been compliant with replacement.       Past Medical History:   Diagnosis Date    Bilateral cataracts     Bladder stones     Blepharitis     Cancer ([facility] [address]     prostate    Hypertension     Kidney stones     with bladder stones    Obesity     Pneumonia     SOB (shortness of breath)         Current Outpatient Prescriptions   Medication Sig Dispense Refill    potassium chloride (MICRO-K) 10 MEQ extended release capsule TAKE 1 CAPSULE BY MOUTH DAILY 90 capsule 1    furosemide (LASIX) 20 MG tablet TAKE 1 TABLET BY MOUTH EVERY DAY 90 tablet 0    levothyroxine (SYNTHROID) 50 MCG tablet TAKE 1 TABLET BY MOUTH EVERY DAY 90 tablet 1    zoster recombinant adjuvanted vaccine (SHINGRIX) 50 MCG SUSR injection Inject 0.5 mLs into the muscle every 3 months for 2 doses 0.5 mL 1    Omega-3 Fatty

## 2018-07-27 ASSESSMENT — ENCOUNTER SYMPTOMS
GASTROINTESTINAL NEGATIVE: 1
COUGH: 1

## 2018-07-30 ENCOUNTER — HOSPITAL ENCOUNTER (OUTPATIENT)
Dept: PHYSICAL THERAPY | Age: 83
Discharge: HOME | End: 2018-08-31
Admitting: PODIATRIST

## 2018-07-30 NOTE — FLOWSHEET NOTE
Physical Therapy Daily Treatment Note  Date:  2018    Patient Name:  Shawn Bailey \"JÚNIOR\"   :  1934  MRN: [id number]  Restrictions/Precautions:  Neuropathy B feet    Medical/Treatment Diagnosis Information:   · Diagnosis: M76.61 Achilles tendonitis (left)  · Treatment Diagnosis: Achilles pain L ankle limiting standing/walking, poor flexibility of L gastroc/HS; weakness of gastroc/HS, decreased balance     Tracking Information:  Physician Information Referring Practitioner: Ayala Torres DPM     Plan of Care Sent Date: faxed  Signed Received:     Visit Count / Total Visits   8/ 10-    Insurance Approved Visits  /  Approved Dates:     Insurance Information PT Insurance Information: Lori Check (secondary). Progress Note/G-codes   []  Yes-   [x]  No Next Due: visit 10      Pain level: 2/10 L med ankle      Subjective:  Pt reports he continues to experience low level discomfort at the achilles insertion of heel. It is aggravated with yardwork and housechores he must do in order to take pressure/duties off of his wife who has some difficulties getting around. He says after therapy, \"if my ankle felt like this all the time, I would be good! \"    Objective:   Observation:   Test measurements:   - TTP +1 medial achilles insertion L ankle    Exercises:  Exercise/Equipment Resistance/Repetitions Other comments   TM  or  bike     IB/ HR-TR 60\" x2 gastroc stretch (before/after US)  30\" x 2 soleus stretch (before/after US)      HSS on stairs     Rockerboard Square board:  AP/ML taps x 10 ea               Total gym  Focus on keeping heels down   Posterior Tibialis Walk - 1 lap fwd/retro in //   Wall Soleus S - knee to wall - foot 3'' back from wall - 3 x 10'' LLE                                     Other Therapeutic Activities:    : Assessed L talar   :  Patient education on PT and plan of care including diagnosis, prognosis, treatment goals and options.   Advised pt that regular stretches will be important part of recovery but that he does not need to wear the Dorsiflexion brace during the day since he can't walk in it. Recommended he try to wear a couple hours at night but if it irritated his pain the he should defer wearing it. Reviewed proper fit and placement of brace. Home Exercise Program:    7/2/18:   Patient was educated on home exercise program including distrubution of handout describing exercises, sets, repetitions, frequency and intensity. Exercises/activities include:  Standing HR/TR, single leg balance, HSS on stairs  6/27:  Pt instructed in gastroc stretch with towel. He did 3x30\" holds L ankle    Manual Treatments:   7/25: L ankle talocrural G3/4 mobs to inc DF/PF. IASTM with Edge tool to L Achilles especially lateral and distal gastroc in prone while applying stretch to gastroc-15'  7/23:  IASTM with HG 9 to L Achilles especially medial and post tib and distal gastroc in prone while applying stretch to gastroc-15'  7/18: IASTM with \"tongue depressor\" Hawkgrip to L achilles, especially medial side, in prone prior to manual PROM gastroc/soleus stretches - 12'  7/16/18: L ankle talocrural mobs to inc DF/PF. TCJ distraction. Manual Gastroc stretch with distraction of ankle. - 10'  7/11/18:  L ankle talocrural mobs to inc DF/PF. Manual Gastroc stretch with distraction of ankle. IASTM with Edge tool to L post tib tendon and Achilles  (18 min)  7/2/18:  IASTM with Edge tool to L-calf and Achilles in prone prior to manual gastroc stretches (14 min)  6/27:  HSS in supine.   IASTM with Edge tool to L-calf and Achilles in prone prior to manual gastroc stretches (17 min)    Modalities:    7/30 - U.S. 2.2W/cm2, 50% duty, left achilles insertion, med/lat tendon, large sound head - 8'    Timed Code Treatment Minutes:  20    Total Treatment Minutes:  28    Treatment/Activity Tolerance:  [x] Patient tolerated treatment well [] Patient limited by fatigue  [] Patient limited by pain  [] Patient limited by other medical complications   [x] Other:  Patient responded favorably to ultrasound tx followed by stretching, marked by reduction of sxs in ankle, improved gait, and subjective reports of normalcy. Patient cannot use k-tape due to adhesive sensitivity. Prognosis: [x] Good [] Fair  [] Poor     Patient Requires Follow-up: [x] Yes  [] No    Plan: 2x/wk for 5-6 wks  [x] Continue per plan of care [] Alter current plan (see comments)  [] Plan of care initiated [] Hold pending MD visit [] Discharge    Plan for Next Session:  Ankle Stability& ankle strength (foot posture variation on TG) Include manual therapy/ IASTM. Continue ultrasound as indicated; performing stretching then strengthening after US tx.     Electronically signed by:  Vesta Carpio DPT, PT [id number]

## 2018-08-01 ENCOUNTER — HOSPITAL ENCOUNTER (OUTPATIENT)
Dept: OTHER | Age: 83
Discharge: HOME | End: 2018-08-01
Attending: PODIATRIST | Admitting: PODIATRIST

## 2018-09-01 ENCOUNTER — HOSPITAL ENCOUNTER (OUTPATIENT)
Dept: OTHER | Age: 83
Discharge: HOME | End: 2018-09-01
Attending: PODIATRIST | Admitting: PODIATRIST

## 2018-09-10 ENCOUNTER — OFFICE VISIT (OUTPATIENT)
Dept: INTERNAL MEDICINE CLINIC | Age: 83
End: 2018-09-10

## 2018-09-10 VITALS
WEIGHT: 251 LBS | HEIGHT: 73 IN | HEART RATE: 56 BPM | DIASTOLIC BLOOD PRESSURE: 72 MMHG | BODY MASS INDEX: 33.27 KG/M2 | SYSTOLIC BLOOD PRESSURE: 110 MMHG

## 2018-09-10 DIAGNOSIS — S61.211A LACERATION OF LEFT INDEX FINGER WITHOUT FOREIGN BODY WITHOUT DAMAGE TO NAIL, INITIAL ENCOUNTER: Primary | ICD-10-CM

## 2018-09-10 DIAGNOSIS — K64.9 BLEEDING HEMORRHOIDS: ICD-10-CM

## 2018-09-10 PROCEDURE — 1036F TOBACCO NON-USER: CPT | Performed by: NURSE PRACTITIONER

## 2018-09-10 PROCEDURE — G8598 ASA/ANTIPLAT THER USED: HCPCS | Performed by: NURSE PRACTITIONER

## 2018-09-10 PROCEDURE — G8417 CALC BMI ABV UP PARAM F/U: HCPCS | Performed by: NURSE PRACTITIONER

## 2018-09-10 PROCEDURE — 1101F PT FALLS ASSESS-DOCD LE1/YR: CPT | Performed by: NURSE PRACTITIONER

## 2018-09-10 PROCEDURE — 1123F ACP DISCUSS/DSCN MKR DOCD: CPT | Performed by: NURSE PRACTITIONER

## 2018-09-10 PROCEDURE — 99213 OFFICE O/P EST LOW 20 MIN: CPT | Performed by: NURSE PRACTITIONER

## 2018-09-10 PROCEDURE — 4040F PNEUMOC VAC/ADMIN/RCVD: CPT | Performed by: NURSE PRACTITIONER

## 2018-09-10 PROCEDURE — G8427 DOCREV CUR MEDS BY ELIG CLIN: HCPCS | Performed by: NURSE PRACTITIONER

## 2018-09-10 NOTE — PROGRESS NOTES
[address]       H+P // CONSULT // OUTPATIENT VISIT // Rosaura Rounds     Referring Doctor Hanny Strong, FAIZAN - CNP   Encounter Type Followup     CHIEF COMPLAINT     VisitType [] Acute [x] Chronic     Symptom [x] None [] CP [] SOB [] Dizzy [] Palps [] Fatigue     Problems CAD, AI, HTN, OBESITY      HISTORY OF PRESENT ILLNESS     Doing well. Denies cp, sob. Compliant with meds. Symptom Y N Frequency Duration Severity Modify Assoc Sx Other   CP [] [x]         SOB [] [x]         Dizzy [] [x]         Syncope [] [x]         Palpitations [] [x]           COMPLIANCE     Category Meds Diet Salt Exercise Tobacco Alcohol Drugs   Compliant [x] [] [] [] [x] [x] [x]   [x]Counseling given on all above above categories    HISTORY/ALLERGIES/ROS     MedHx:  has a past medical history of Bilateral cataracts; Bladder stones; Blepharitis; Cancer ([address] Hypertension; Kidney stones; Obesity; Pneumonia; and SOB (shortness of breath). SurgHx:  has a past surgical history that includes Skin cancer excision; Cholecystectomy; Prostatectomy; Colonoscopy; Cystocopy (8/13/12); other surgical history (8/25/12); and eye surgery. SocHx:   reports that he quit smoking about 34 years ago. He has a 38.00 pack-year smoking history. He has never used smokeless tobacco. He reports that he does not drink alcohol or use drugs. FamHx: family history includes Bleeding Prob (age of onset: 47) in his father; Dementia in his mother; High Blood Pressure in his mother; High Cholesterol in his mother; Other (age of onset: [de-identified]) in his mother. Allergies: Adhesive tape   ROS:  [x]Full ROS obtained and negative except as mentioned in HPI     MEDICATIONS      Current Outpatient Prescriptions   Medication Sig Dispense Refill    hydrocortisone (ANUSOL-HC) 2.5 % rectal cream Place rectally 2 times daily.  28.35 g 3    cephALEXin (KEFLEX) 500 MG capsule Take 1 capsule by mouth 4 times daily for 10 days 40 capsule 0    potassium chloride (MICRO-K) 10 MEQ extended release capsule TAKE 1 CAPSULE BY MOUTH DAILY 90 capsule 1    furosemide (LASIX) 20 MG tablet TAKE 1 TABLET BY MOUTH EVERY DAY 90 tablet 0    levothyroxine (SYNTHROID) 50 MCG tablet TAKE 1 TABLET BY MOUTH EVERY DAY 90 tablet 1    zoster recombinant adjuvanted vaccine (SHINGRIX) 50 MCG SUSR injection Inject 0.5 mLs into the muscle every 3 months for 2 doses 0.5 mL 1    Omega-3 Fatty Acids (FISH OIL) 1000 MG CAPS Take 3,000 mg by mouth 3 times daily      propranolol (INDERAL) 20 MG tablet Take 1 tablet by mouth daily 90 tablet 3    vitamin D (CHOLECALCIFEROL) 1000 units TABS tablet Take 1 tablet by mouth daily 30 tablet 0    donepezil (ARICEPT) 5 MG tablet Take 1 tablet by mouth nightly 90 tablet 3    rivastigmine (EXELON) 1.5 MG capsule TAKE 1 CAPSULE BY MOUTH TWICE DAILY 180 capsule 0    doxycycline hyclate (VIBRAMYCIN) 100 MG capsule TK 1 C PO BID  2    tobramycin-dexamethasone (TOBRADEX) 0.3-0.1 % ophthalmic ointment 3 times daily 3 times daily.  Coenzyme Q10 (CO Q 10 PO) Take  by mouth.  aspirin 81 MG tablet Take 81 mg by mouth daily.  multivitamin (ANTIOXIDANT;PROSIGHT) TABS per tablet Take 1 tablet by mouth daily. No current facility-administered medications for this visit.       Reviewed with patient and will remain unchanged except as mentioned in A/P  PHYSICAL EXAM     Vitals:    09/12/18 1155   BP: 112/62   Pulse: 56      Gen Alert, coop, no distress Heart  RRR, no MRG, nl apical impulse   Head NC, AT, no abnorm Abd  Soft, NT, +BS, no mass, no OM   Eyes PER, conj/corn clear Ext  Ext nl, AT, no C/C/E   Nose Nares nl, no drain, NT Pulse 2+ and symmetric   Throat Lips, mucosa, tongue nl Skin Col/text/turg nl, no vis rash/les   Neck S/S, TM, NT, no bruit/JVD Psych Nl mood and affect   Lung CTA-B, unlabored, no DTP Lymph   No cervical or axillary LA   Ch wall NT, no deform Neuro  Nl gross M/S exam     ASSESSMENT AND PLAN     ~CAD   Date EF Results   Sx   No

## 2018-09-10 NOTE — PATIENT INSTRUCTIONS
[facility], Dr. Daniel Lange MD (Colorectal)  [address]  [phone]

## 2018-09-13 ENCOUNTER — OFFICE VISIT (OUTPATIENT)
Dept: CARDIOLOGY CLINIC | Age: 83
End: 2018-09-13

## 2018-09-13 VITALS
SYSTOLIC BLOOD PRESSURE: 112 MMHG | DIASTOLIC BLOOD PRESSURE: 62 MMHG | BODY MASS INDEX: 32.6 KG/M2 | HEART RATE: 56 BPM | WEIGHT: 246 LBS | HEIGHT: 73 IN

## 2018-09-13 DIAGNOSIS — E66.9 OBESITY (BMI 30-39.9): ICD-10-CM

## 2018-09-13 DIAGNOSIS — I10 ESSENTIAL HYPERTENSION: ICD-10-CM

## 2018-09-13 DIAGNOSIS — I35.1 NONRHEUMATIC AORTIC VALVE INSUFFICIENCY: ICD-10-CM

## 2018-09-13 DIAGNOSIS — I25.10 CORONARY ARTERY DISEASE INVOLVING NATIVE CORONARY ARTERY OF NATIVE HEART WITHOUT ANGINA PECTORIS: Primary | ICD-10-CM

## 2018-09-13 PROCEDURE — 1036F TOBACCO NON-USER: CPT | Performed by: INTERNAL MEDICINE

## 2018-09-13 PROCEDURE — G8598 ASA/ANTIPLAT THER USED: HCPCS | Performed by: INTERNAL MEDICINE

## 2018-09-13 PROCEDURE — G8427 DOCREV CUR MEDS BY ELIG CLIN: HCPCS | Performed by: INTERNAL MEDICINE

## 2018-09-13 PROCEDURE — 99214 OFFICE O/P EST MOD 30 MIN: CPT | Performed by: INTERNAL MEDICINE

## 2018-09-13 PROCEDURE — G8417 CALC BMI ABV UP PARAM F/U: HCPCS | Performed by: INTERNAL MEDICINE

## 2018-09-13 PROCEDURE — 4040F PNEUMOC VAC/ADMIN/RCVD: CPT | Performed by: INTERNAL MEDICINE

## 2018-09-13 PROCEDURE — 1123F ACP DISCUSS/DSCN MKR DOCD: CPT | Performed by: INTERNAL MEDICINE

## 2018-09-13 PROCEDURE — 1101F PT FALLS ASSESS-DOCD LE1/YR: CPT | Performed by: INTERNAL MEDICINE

## 2018-10-12 RX ORDER — FUROSEMIDE 20 MG/1
TABLET ORAL
Qty: 90 TABLET | Refills: 1 | Status: SHIPPED | OUTPATIENT
Start: 2018-10-12 | End: 2019-04-10 | Stop reason: SDUPTHER

## 2018-10-25 DIAGNOSIS — Z23 NEED FOR INFLUENZA VACCINATION: Primary | ICD-10-CM

## 2018-10-25 PROCEDURE — G0008 ADMIN INFLUENZA VIRUS VAC: HCPCS | Performed by: NURSE PRACTITIONER

## 2018-10-25 PROCEDURE — 90662 IIV NO PRSV INCREASED AG IM: CPT | Performed by: NURSE PRACTITIONER

## 2018-10-31 RX ORDER — LEVOTHYROXINE SODIUM 0.05 MG/1
TABLET ORAL
Qty: 90 TABLET | Refills: 0 | Status: SHIPPED | OUTPATIENT
Start: 2018-10-31 | End: 2019-02-02 | Stop reason: SDUPTHER

## 2018-11-29 ENCOUNTER — OFFICE VISIT (OUTPATIENT)
Dept: INTERNAL MEDICINE CLINIC | Age: 83
End: 2018-11-29
Payer: MEDICARE

## 2018-11-29 VITALS
BODY MASS INDEX: 32.74 KG/M2 | WEIGHT: 247 LBS | HEIGHT: 73 IN | DIASTOLIC BLOOD PRESSURE: 70 MMHG | SYSTOLIC BLOOD PRESSURE: 132 MMHG | HEART RATE: 56 BPM

## 2018-11-29 DIAGNOSIS — F03.90 DEMENTIA WITHOUT BEHAVIORAL DISTURBANCE, UNSPECIFIED DEMENTIA TYPE: ICD-10-CM

## 2018-11-29 DIAGNOSIS — I25.10 CORONARY ARTERY DISEASE INVOLVING NATIVE CORONARY ARTERY OF NATIVE HEART WITHOUT ANGINA PECTORIS: ICD-10-CM

## 2018-11-29 DIAGNOSIS — I70.219 ATHEROSCLEROSIS OF NATIVE ARTERY OF LOWER EXTREMITY WITH INTERMITTENT CLAUDICATION, UNSPECIFIED LATERALITY (HCC): ICD-10-CM

## 2018-11-29 DIAGNOSIS — I10 ESSENTIAL HYPERTENSION: Primary | ICD-10-CM

## 2018-11-29 PROCEDURE — 99213 OFFICE O/P EST LOW 20 MIN: CPT | Performed by: NURSE PRACTITIONER

## 2018-11-29 PROCEDURE — G8427 DOCREV CUR MEDS BY ELIG CLIN: HCPCS | Performed by: NURSE PRACTITIONER

## 2018-11-29 PROCEDURE — G8598 ASA/ANTIPLAT THER USED: HCPCS | Performed by: NURSE PRACTITIONER

## 2018-11-29 PROCEDURE — 1036F TOBACCO NON-USER: CPT | Performed by: NURSE PRACTITIONER

## 2018-11-29 PROCEDURE — 1101F PT FALLS ASSESS-DOCD LE1/YR: CPT | Performed by: NURSE PRACTITIONER

## 2018-11-29 PROCEDURE — 1123F ACP DISCUSS/DSCN MKR DOCD: CPT | Performed by: NURSE PRACTITIONER

## 2018-11-29 PROCEDURE — G8482 FLU IMMUNIZE ORDER/ADMIN: HCPCS | Performed by: NURSE PRACTITIONER

## 2018-11-29 PROCEDURE — 4040F PNEUMOC VAC/ADMIN/RCVD: CPT | Performed by: NURSE PRACTITIONER

## 2018-11-29 PROCEDURE — G8417 CALC BMI ABV UP PARAM F/U: HCPCS | Performed by: NURSE PRACTITIONER

## 2018-11-29 RX ORDER — DONEPEZIL HYDROCHLORIDE 5 MG/1
5 TABLET, FILM COATED ORAL NIGHTLY
Qty: 90 TABLET | Refills: 0 | Status: SHIPPED | OUTPATIENT
Start: 2018-11-29 | End: 2019-06-26

## 2018-11-29 RX ORDER — DONEPEZIL HYDROCHLORIDE 5 MG/1
5 TABLET, FILM COATED ORAL NIGHTLY
Qty: 90 TABLET | Refills: 1 | Status: SHIPPED | OUTPATIENT
Start: 2018-11-29 | End: 2019-06-26 | Stop reason: SDUPTHER

## 2018-12-02 ASSESSMENT — ENCOUNTER SYMPTOMS
SHORTNESS OF BREATH: 0
GASTROINTESTINAL NEGATIVE: 1

## 2018-12-02 NOTE — PROGRESS NOTES
medications for this visit. Review of Systems   Constitutional: Negative. Respiratory: Negative for shortness of breath. Cardiovascular: Negative for chest pain. Gastrointestinal: Negative. Genitourinary: Negative. Skin: Negative. All other systems reviewed and are negative. OBJECTIVE:  Physical Exam   Constitutional: He is oriented to person, place, and time. He appears well-developed and well-nourished. No distress. HENT:   Head: Normocephalic and atraumatic. Eyes: Conjunctivae are normal.   Cardiovascular: Normal rate and regular rhythm. Pulmonary/Chest: Effort normal and breath sounds normal. No respiratory distress. Neurological: He is alert and oriented to person, place, and time. Skin: Skin is warm and dry. He is not diaphoretic. /70   Pulse 56 Comment: irreg  Ht 6' 1\" (1.854 m)   Wt 247 lb (112 kg)   BMI 32.59 kg/m²        ASSESSMENT  PLAN:  Jaleel Sprague was seen today for follow-up. Diagnoses and all orders for this visit:    Essential hypertension  - Normotensive  - he has met JNC standards.  - Continue current regimen.     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

## 2019-01-14 RX ORDER — POTASSIUM CHLORIDE 750 MG/1
CAPSULE, EXTENDED RELEASE ORAL
Qty: 90 CAPSULE | Refills: 1 | Status: SHIPPED | OUTPATIENT
Start: 2019-01-14 | End: 2019-06-26 | Stop reason: SDUPTHER

## 2019-01-18 PROBLEM — E66.9 OBESITY (BMI 30-39.9): Status: ACTIVE | Noted: 2019-01-18

## 2019-01-21 ENCOUNTER — HOSPITAL ENCOUNTER (OUTPATIENT)
Dept: VASCULAR LAB | Age: 84
Discharge: HOME | End: 2019-01-21
Payer: MEDICARE

## 2019-01-21 DIAGNOSIS — L03.115 CELLULITIS OF RIGHT LOWER EXTREMITY: Primary | ICD-10-CM

## 2019-01-21 PROCEDURE — 93971 EXTREMITY STUDY: CPT

## 2019-01-24 ENCOUNTER — OFFICE VISIT (OUTPATIENT)
Dept: CARDIOLOGY CLINIC | Age: 84
End: 2019-01-24
Payer: MEDICARE

## 2019-01-24 VITALS
OXYGEN SATURATION: 94 % | HEART RATE: 48 BPM | SYSTOLIC BLOOD PRESSURE: 120 MMHG | WEIGHT: 246 LBS | BODY MASS INDEX: 32.6 KG/M2 | HEIGHT: 73 IN | DIASTOLIC BLOOD PRESSURE: 52 MMHG

## 2019-01-24 DIAGNOSIS — I25.10 CORONARY ARTERY DISEASE INVOLVING NATIVE CORONARY ARTERY OF NATIVE HEART WITHOUT ANGINA PECTORIS: Primary | ICD-10-CM

## 2019-01-24 DIAGNOSIS — I10 ESSENTIAL HYPERTENSION: ICD-10-CM

## 2019-01-24 DIAGNOSIS — E66.9 OBESITY (BMI 30-39.9): ICD-10-CM

## 2019-01-24 DIAGNOSIS — I35.1 NONRHEUMATIC AORTIC VALVE INSUFFICIENCY: ICD-10-CM

## 2019-01-24 PROCEDURE — 1101F PT FALLS ASSESS-DOCD LE1/YR: CPT | Performed by: INTERNAL MEDICINE

## 2019-01-24 PROCEDURE — G8427 DOCREV CUR MEDS BY ELIG CLIN: HCPCS | Performed by: INTERNAL MEDICINE

## 2019-01-24 PROCEDURE — 4040F PNEUMOC VAC/ADMIN/RCVD: CPT | Performed by: INTERNAL MEDICINE

## 2019-01-24 PROCEDURE — 1123F ACP DISCUSS/DSCN MKR DOCD: CPT | Performed by: INTERNAL MEDICINE

## 2019-01-24 PROCEDURE — G8598 ASA/ANTIPLAT THER USED: HCPCS | Performed by: INTERNAL MEDICINE

## 2019-01-24 PROCEDURE — G8417 CALC BMI ABV UP PARAM F/U: HCPCS | Performed by: INTERNAL MEDICINE

## 2019-01-24 PROCEDURE — G8482 FLU IMMUNIZE ORDER/ADMIN: HCPCS | Performed by: INTERNAL MEDICINE

## 2019-01-24 PROCEDURE — 99214 OFFICE O/P EST MOD 30 MIN: CPT | Performed by: INTERNAL MEDICINE

## 2019-01-24 PROCEDURE — 1036F TOBACCO NON-USER: CPT | Performed by: INTERNAL MEDICINE

## 2019-02-04 RX ORDER — LEVOTHYROXINE SODIUM 0.05 MG/1
TABLET ORAL
Qty: 90 TABLET | Refills: 1 | Status: SHIPPED | OUTPATIENT
Start: 2019-02-04 | End: 2019-06-26 | Stop reason: SDUPTHER

## 2019-02-20 ENCOUNTER — OFFICE VISIT (OUTPATIENT)
Dept: INTERNAL MEDICINE CLINIC | Age: 84
End: 2019-02-20
Payer: MEDICARE

## 2019-02-20 VITALS
HEIGHT: 73 IN | DIASTOLIC BLOOD PRESSURE: 80 MMHG | HEART RATE: 56 BPM | WEIGHT: 228 LBS | SYSTOLIC BLOOD PRESSURE: 122 MMHG | BODY MASS INDEX: 30.22 KG/M2

## 2019-02-20 DIAGNOSIS — I25.10 CORONARY ARTERY DISEASE INVOLVING NATIVE CORONARY ARTERY OF NATIVE HEART WITHOUT ANGINA PECTORIS: ICD-10-CM

## 2019-02-20 DIAGNOSIS — I10 ESSENTIAL HYPERTENSION: ICD-10-CM

## 2019-02-20 DIAGNOSIS — L03.115 CELLULITIS OF RIGHT LEG: Primary | ICD-10-CM

## 2019-02-20 DIAGNOSIS — I70.219 ATHEROSCLEROSIS OF NATIVE ARTERY OF LOWER EXTREMITY WITH INTERMITTENT CLAUDICATION, UNSPECIFIED LATERALITY (HCC): ICD-10-CM

## 2019-02-20 DIAGNOSIS — I48.0 PAF (PAROXYSMAL ATRIAL FIBRILLATION) (HCC): ICD-10-CM

## 2019-02-20 PROCEDURE — 1123F ACP DISCUSS/DSCN MKR DOCD: CPT | Performed by: NURSE PRACTITIONER

## 2019-02-20 PROCEDURE — G8417 CALC BMI ABV UP PARAM F/U: HCPCS | Performed by: NURSE PRACTITIONER

## 2019-02-20 PROCEDURE — G8482 FLU IMMUNIZE ORDER/ADMIN: HCPCS | Performed by: NURSE PRACTITIONER

## 2019-02-20 PROCEDURE — 1101F PT FALLS ASSESS-DOCD LE1/YR: CPT | Performed by: NURSE PRACTITIONER

## 2019-02-20 PROCEDURE — G8598 ASA/ANTIPLAT THER USED: HCPCS | Performed by: NURSE PRACTITIONER

## 2019-02-20 PROCEDURE — 4040F PNEUMOC VAC/ADMIN/RCVD: CPT | Performed by: NURSE PRACTITIONER

## 2019-02-20 PROCEDURE — G8427 DOCREV CUR MEDS BY ELIG CLIN: HCPCS | Performed by: NURSE PRACTITIONER

## 2019-02-20 PROCEDURE — 99214 OFFICE O/P EST MOD 30 MIN: CPT | Performed by: NURSE PRACTITIONER

## 2019-02-20 PROCEDURE — 1036F TOBACCO NON-USER: CPT | Performed by: NURSE PRACTITIONER

## 2019-02-20 ASSESSMENT — PATIENT HEALTH QUESTIONNAIRE - PHQ9
1. LITTLE INTEREST OR PLEASURE IN DOING THINGS: 0
SUM OF ALL RESPONSES TO PHQ QUESTIONS 1-9: 0
2. FEELING DOWN, DEPRESSED OR HOPELESS: 0
SUM OF ALL RESPONSES TO PHQ9 QUESTIONS 1 & 2: 0
SUM OF ALL RESPONSES TO PHQ QUESTIONS 1-9: 0

## 2019-02-24 ASSESSMENT — ENCOUNTER SYMPTOMS
GASTROINTESTINAL NEGATIVE: 1
SHORTNESS OF BREATH: 0
COLOR CHANGE: 1

## 2019-04-01 ENCOUNTER — OFFICE VISIT (OUTPATIENT)
Dept: ENT CLINIC | Age: 84
End: 2019-04-01
Payer: MEDICARE

## 2019-04-01 VITALS — SYSTOLIC BLOOD PRESSURE: 136 MMHG | DIASTOLIC BLOOD PRESSURE: 84 MMHG

## 2019-04-01 DIAGNOSIS — H61.23 BILATERAL IMPACTED CERUMEN: Primary | ICD-10-CM

## 2019-04-01 PROCEDURE — 1036F TOBACCO NON-USER: CPT | Performed by: OTOLARYNGOLOGY

## 2019-04-01 PROCEDURE — 99212 OFFICE O/P EST SF 10 MIN: CPT | Performed by: OTOLARYNGOLOGY

## 2019-04-01 PROCEDURE — G8598 ASA/ANTIPLAT THER USED: HCPCS | Performed by: OTOLARYNGOLOGY

## 2019-04-01 PROCEDURE — 69210 REMOVE IMPACTED EAR WAX UNI: CPT | Performed by: OTOLARYNGOLOGY

## 2019-04-01 PROCEDURE — 1123F ACP DISCUSS/DSCN MKR DOCD: CPT | Performed by: OTOLARYNGOLOGY

## 2019-04-01 PROCEDURE — G8428 CUR MEDS NOT DOCUMENT: HCPCS | Performed by: OTOLARYNGOLOGY

## 2019-04-01 PROCEDURE — G8417 CALC BMI ABV UP PARAM F/U: HCPCS | Performed by: OTOLARYNGOLOGY

## 2019-04-01 PROCEDURE — 4040F PNEUMOC VAC/ADMIN/RCVD: CPT | Performed by: OTOLARYNGOLOGY

## 2019-04-01 NOTE — PROGRESS NOTES
Patient has noted some stuffiness in both of his ears are likely related to cerumen accumulation which she's had multiple times in the past.  He has had no pain and no drainage. There's been no change in tinnitus and no vertigo is noted. Hearing seems to be read reduced with the current situation. He has had no fever does not smoke. Currently, he appears in no obvious acute distress. Significant cerumen accumulation as noted in both ear canals occluding them. This is removed bilaterally with irrigation, suctioning, curettage, and peroxide on a Q-tip. Upon removal, the tympanic membrane membranes appear normal and ear canals are now clear. Hearing seems improved. Oral examination is unremarkable. The nasal mucosa is normal.  The neck is free of adenopathy, mass, thyroid enlargement. It is suggested that he use Debrox perhaps on a monthly basis and return in 6 months for repeat evaluation.

## 2019-04-11 RX ORDER — FUROSEMIDE 20 MG/1
TABLET ORAL
Qty: 90 TABLET | Refills: 1 | Status: SHIPPED | OUTPATIENT
Start: 2019-04-11 | End: 2019-10-08 | Stop reason: SDUPTHER

## 2019-05-06 RX ORDER — PROPRANOLOL HYDROCHLORIDE 20 MG/1
20 TABLET ORAL DAILY
Qty: 90 TABLET | Refills: 0 | Status: SHIPPED | OUTPATIENT
Start: 2019-05-06 | End: 2019-07-31 | Stop reason: SDUPTHER

## 2019-05-10 DIAGNOSIS — Z13.820 SCREENING FOR OSTEOPOROSIS: Primary | ICD-10-CM

## 2019-06-26 ENCOUNTER — OFFICE VISIT (OUTPATIENT)
Dept: INTERNAL MEDICINE CLINIC | Age: 84
End: 2019-06-26
Payer: MEDICARE

## 2019-06-26 VITALS
BODY MASS INDEX: 33.27 KG/M2 | DIASTOLIC BLOOD PRESSURE: 80 MMHG | WEIGHT: 251 LBS | SYSTOLIC BLOOD PRESSURE: 132 MMHG | HEIGHT: 73 IN | HEART RATE: 56 BPM

## 2019-06-26 DIAGNOSIS — I10 ESSENTIAL HYPERTENSION: ICD-10-CM

## 2019-06-26 DIAGNOSIS — R25.1 TREMOR: Primary | ICD-10-CM

## 2019-06-26 DIAGNOSIS — E03.9 ACQUIRED HYPOTHYROIDISM: ICD-10-CM

## 2019-06-26 DIAGNOSIS — F03.90 DEMENTIA WITHOUT BEHAVIORAL DISTURBANCE, UNSPECIFIED DEMENTIA TYPE: ICD-10-CM

## 2019-06-26 DIAGNOSIS — I48.0 PAF (PAROXYSMAL ATRIAL FIBRILLATION) (HCC): ICD-10-CM

## 2019-06-26 DIAGNOSIS — I25.10 CORONARY ARTERY DISEASE INVOLVING NATIVE CORONARY ARTERY OF NATIVE HEART WITHOUT ANGINA PECTORIS: ICD-10-CM

## 2019-06-26 PROCEDURE — 4040F PNEUMOC VAC/ADMIN/RCVD: CPT | Performed by: NURSE PRACTITIONER

## 2019-06-26 PROCEDURE — G8598 ASA/ANTIPLAT THER USED: HCPCS | Performed by: NURSE PRACTITIONER

## 2019-06-26 PROCEDURE — G8417 CALC BMI ABV UP PARAM F/U: HCPCS | Performed by: NURSE PRACTITIONER

## 2019-06-26 PROCEDURE — 1123F ACP DISCUSS/DSCN MKR DOCD: CPT | Performed by: NURSE PRACTITIONER

## 2019-06-26 PROCEDURE — G8427 DOCREV CUR MEDS BY ELIG CLIN: HCPCS | Performed by: NURSE PRACTITIONER

## 2019-06-26 PROCEDURE — 1036F TOBACCO NON-USER: CPT | Performed by: NURSE PRACTITIONER

## 2019-06-26 PROCEDURE — 99214 OFFICE O/P EST MOD 30 MIN: CPT | Performed by: NURSE PRACTITIONER

## 2019-06-26 RX ORDER — DONEPEZIL HYDROCHLORIDE 5 MG/1
5 TABLET, FILM COATED ORAL NIGHTLY
Qty: 90 TABLET | Refills: 3 | Status: SHIPPED | OUTPATIENT
Start: 2019-06-26 | End: 2020-06-08

## 2019-06-26 RX ORDER — LEVOTHYROXINE SODIUM 0.05 MG/1
50 TABLET ORAL DAILY
Qty: 90 TABLET | Refills: 3 | Status: SHIPPED | OUTPATIENT
Start: 2019-06-26 | End: 2020-08-04

## 2019-06-26 RX ORDER — POTASSIUM CHLORIDE 750 MG/1
10 CAPSULE, EXTENDED RELEASE ORAL DAILY
Qty: 90 CAPSULE | Refills: 3 | Status: SHIPPED | OUTPATIENT
Start: 2019-06-26 | End: 2020-06-24

## 2019-06-26 RX ORDER — PRIMIDONE 50 MG/1
50 TABLET ORAL DAILY
Qty: 30 TABLET | Refills: 5 | Status: SHIPPED | OUTPATIENT
Start: 2019-06-26 | End: 2019-09-11 | Stop reason: SDUPTHER

## 2019-06-30 ASSESSMENT — ENCOUNTER SYMPTOMS
GASTROINTESTINAL NEGATIVE: 1
SHORTNESS OF BREATH: 0

## 2019-06-30 NOTE — PROGRESS NOTES
SUBJECTIVE:    Patient ID: Jarocho Beth is a 80 y.o. male. CC: Hypertension, CAD, tremor    HPI: The patient presents to the office today for follow-up of chronic medical conditions. Tremor is worse after cardiology reduced beta blocker due to bradycardia.       He has history of hypertension, coronary disease, high cholesterol. He denies any chest pain. He is taking his medication as directed.     Hyothyroid: He is compliant with Synthroid. Feels his memory disorder stable      Past Medical History:   Diagnosis Date    Bilateral cataracts     Bladder stones     Blepharitis     Cancer ([facility] [address]     prostate    Hypertension     Kidney stones     with bladder stones    Obesity     Pneumonia     SOB (shortness of breath)         Current Outpatient Medications   Medication Sig Dispense Refill    potassium chloride (MICRO-K) 10 MEQ extended release capsule Take 1 capsule by mouth daily 90 capsule 3    levothyroxine (SYNTHROID) 50 MCG tablet Take 1 tablet by mouth Daily 90 tablet 3    donepezil (ARICEPT) 5 MG tablet Take 1 tablet by mouth nightly 90 tablet 3    primidone (MYSOLINE) 50 MG tablet Take 1 tablet by mouth daily 30 tablet 5    propranolol (INDERAL) 20 MG tablet TAKE 1 TABLET BY MOUTH DAILY 90 tablet 0    furosemide (LASIX) 20 MG tablet TAKE 1 TABLET BY MOUTH EVERY DAY 90 tablet 1    vitamin D (CHOLECALCIFEROL) 1000 units TABS tablet Take 1 tablet by mouth daily (Patient taking differently: Take 1,000 Units by mouth ) 30 tablet 0    Coenzyme Q10 (CO Q 10 PO) Take  by mouth.  aspirin 81 MG tablet Take 81 mg by mouth daily.  multivitamin (ANTIOXIDANT;PROSIGHT) TABS per tablet Take 1 tablet by mouth daily.  Omega-3 Fatty Acids (FISH OIL) 1000 MG CAPS Take 3,000 mg by mouth 3 times daily       No current facility-administered medications for this visit. Review of Systems   Constitutional: Negative. Respiratory: Negative for shortness of breath.

## 2019-07-31 RX ORDER — PROPRANOLOL HYDROCHLORIDE 20 MG/1
20 TABLET ORAL DAILY
Qty: 90 TABLET | Refills: 0 | Status: SHIPPED | OUTPATIENT
Start: 2019-07-31 | End: 2019-10-28 | Stop reason: SDUPTHER

## 2019-09-11 ENCOUNTER — OFFICE VISIT (OUTPATIENT)
Dept: INTERNAL MEDICINE CLINIC | Age: 84
End: 2019-09-11
Payer: MEDICARE

## 2019-09-11 VITALS
BODY MASS INDEX: 32.47 KG/M2 | SYSTOLIC BLOOD PRESSURE: 132 MMHG | DIASTOLIC BLOOD PRESSURE: 76 MMHG | HEIGHT: 73 IN | HEART RATE: 43 BPM | WEIGHT: 245 LBS

## 2019-09-11 DIAGNOSIS — Z00.00 ROUTINE GENERAL MEDICAL EXAMINATION AT A HEALTH CARE FACILITY: Primary | ICD-10-CM

## 2019-09-11 DIAGNOSIS — R25.1 TREMOR: ICD-10-CM

## 2019-09-11 DIAGNOSIS — I10 ESSENTIAL HYPERTENSION: ICD-10-CM

## 2019-09-11 DIAGNOSIS — M54.5 CHRONIC BILATERAL LOW BACK PAIN, WITH SCIATICA PRESENCE UNSPECIFIED: ICD-10-CM

## 2019-09-11 DIAGNOSIS — Z85.46 HISTORY OF PROSTATE CANCER: ICD-10-CM

## 2019-09-11 DIAGNOSIS — Z23 NEED FOR INFLUENZA VACCINATION: ICD-10-CM

## 2019-09-11 DIAGNOSIS — E55.9 VITAMIN D DEFICIENCY: ICD-10-CM

## 2019-09-11 DIAGNOSIS — G89.29 CHRONIC BILATERAL LOW BACK PAIN, WITH SCIATICA PRESENCE UNSPECIFIED: ICD-10-CM

## 2019-09-11 DIAGNOSIS — E03.9 ACQUIRED HYPOTHYROIDISM: ICD-10-CM

## 2019-09-11 LAB
A/G RATIO: 2.2 (ref 1.1–2.2)
ALBUMIN SERPL-MCNC: 4.3 G/DL (ref 3.4–5)
ALP BLD-CCNC: 49 U/L (ref 40–129)
ALT SERPL-CCNC: 17 U/L (ref 10–40)
ANION GAP SERPL CALCULATED.3IONS-SCNC: 17 MMOL/L (ref 3–16)
AST SERPL-CCNC: 17 U/L (ref 15–37)
BILIRUB SERPL-MCNC: 0.6 MG/DL (ref 0–1)
BUN BLDV-MCNC: 23 MG/DL (ref 7–20)
CALCIUM SERPL-MCNC: 9.1 MG/DL (ref 8.3–10.6)
CHLORIDE BLD-SCNC: 101 MMOL/L (ref 99–110)
CHOLESTEROL, TOTAL: 108 MG/DL (ref 0–199)
CO2: 24 MMOL/L (ref 21–32)
CREAT SERPL-MCNC: 1.5 MG/DL (ref 0.8–1.3)
GFR AFRICAN AMERICAN: 54
GFR NON-AFRICAN AMERICAN: 44
GLOBULIN: 2 G/DL
GLUCOSE BLD-MCNC: 80 MG/DL (ref 70–99)
HDLC SERPL-MCNC: 36 MG/DL (ref 40–60)
LDL CHOLESTEROL CALCULATED: 55 MG/DL
POTASSIUM SERPL-SCNC: 4.7 MMOL/L (ref 3.5–5.1)
PROSTATE SPECIFIC ANTIGEN: 0.6 NG/ML (ref 0–4)
SODIUM BLD-SCNC: 142 MMOL/L (ref 136–145)
TOTAL PROTEIN: 6.3 G/DL (ref 6.4–8.2)
TRIGL SERPL-MCNC: 84 MG/DL (ref 0–150)
TSH SERPL DL<=0.05 MIU/L-ACNC: 4.89 UIU/ML (ref 0.27–4.2)
VITAMIN D 25-HYDROXY: 35.6 NG/ML
VLDLC SERPL CALC-MCNC: 17 MG/DL

## 2019-09-11 PROCEDURE — 1123F ACP DISCUSS/DSCN MKR DOCD: CPT | Performed by: NURSE PRACTITIONER

## 2019-09-11 PROCEDURE — 4040F PNEUMOC VAC/ADMIN/RCVD: CPT | Performed by: NURSE PRACTITIONER

## 2019-09-11 PROCEDURE — G0008 ADMIN INFLUENZA VIRUS VAC: HCPCS | Performed by: NURSE PRACTITIONER

## 2019-09-11 PROCEDURE — G8598 ASA/ANTIPLAT THER USED: HCPCS | Performed by: NURSE PRACTITIONER

## 2019-09-11 PROCEDURE — G0438 PPPS, INITIAL VISIT: HCPCS | Performed by: NURSE PRACTITIONER

## 2019-09-11 PROCEDURE — 90653 IIV ADJUVANT VACCINE IM: CPT | Performed by: NURSE PRACTITIONER

## 2019-09-11 RX ORDER — PRIMIDONE 50 MG/1
50 TABLET ORAL DAILY
Qty: 90 TABLET | Refills: 3 | Status: SHIPPED | OUTPATIENT
Start: 2019-09-11 | End: 2020-08-31

## 2019-09-11 ASSESSMENT — PATIENT HEALTH QUESTIONNAIRE - PHQ9
SUM OF ALL RESPONSES TO PHQ QUESTIONS 1-9: 0
SUM OF ALL RESPONSES TO PHQ QUESTIONS 1-9: 0

## 2019-09-11 ASSESSMENT — LIFESTYLE VARIABLES: HOW OFTEN DO YOU HAVE A DRINK CONTAINING ALCOHOL: 0

## 2019-09-17 NOTE — PROGRESS NOTES
tablet TAKE 1 TABLET BY MOUTH DAILY 90 tablet 0    potassium chloride (MICRO-K) 10 MEQ extended release capsule Take 1 capsule by mouth daily 90 capsule 3    levothyroxine (SYNTHROID) 50 MCG tablet Take 1 tablet by mouth Daily 90 tablet 3    donepezil (ARICEPT) 5 MG tablet Take 1 tablet by mouth nightly 90 tablet 3    furosemide (LASIX) 20 MG tablet TAKE 1 TABLET BY MOUTH EVERY DAY 90 tablet 1    Omega-3 Fatty Acids (FISH OIL) 1000 MG CAPS Take 3,000 mg by mouth 3 times daily      vitamin D (CHOLECALCIFEROL) 1000 units TABS tablet Take 1 tablet by mouth daily (Patient taking differently: Take 1,000 Units by mouth ) 30 tablet 0    Coenzyme Q10 (CO Q 10 PO) Take  by mouth.  aspirin 81 MG tablet Take 81 mg by mouth daily.  multivitamin (ANTIOXIDANT;PROSIGHT) TABS per tablet Take 1 tablet by mouth daily. No current facility-administered medications for this visit.       Reviewed with patient and will remain unchanged except as mentioned in A/P  PHYSICAL EXAM     Vitals:    09/19/19 1523   BP: 130/72   Pulse: 62   SpO2: 98%      Gen Alert, coop, no distress Heart  Rrr, 1/6   Head NC, AT, no abnorm Abd  Soft, NT, +BS, no mass, no OM   Eyes PER, conj/corn clear Ext  Ext nl, AT, no C/C/E   Nose Nares nl, no drain, NT Pulse 2+ and symmetric   Throat Lips, mucosa, tongue nl Skin Col/text/turg nl, no vis rash/les   Neck S/S, TM, NT, no bruit/JVD Psych Nl mood and affect   Lung CTA-B, unlabored, no DTP Lymph   No cervical or axillary LA   Ch wall NT, no deform Neuro  Nl gross M/S exam     ASSESSMENT AND PLAN     ~CAD   Date EF Results   Sx   No concerning   [facility]   []I         [x]II           []III          []IV   [facility]   Patient declined   MPI 3/12  Mixed inferior I/S   TTE 8/16 55% Mild AI, mild to mod TR   Plan   Continue aggressive medical treatment at doses above  Refuses heart cath for abnormal stress   ~AI   Date EF Detail   Sx   No concerning   TTE 8/16 55% Mild AI   Plan   Continued observation ~HTN  Today BP Controlled   Counseling Counseled on diet/salt, exercise and ideal body weight   Plan Continue current meds at doses above   ~Obesity  BMI Body mass index is 31.66 kg/m². Plan Counseled on diet, exercise, weight loss options in detail   ~Followup  Interval:  6 months    [address] [place]ER[place]ND[place], Axel Tee, am scribing for and in the presence of Adelaide Austin MD.   Signed, Axel Tee 09/17/19 1:23 PM   Provider Attestation  Axel Tee is working as a scribe for and in the presence of mindi Austin MD). Working as a scribe, Axel Tee may have prepopulated components of this note with my historical  intellectual property under my direct supervision. Any additions to this intellectual property were performed in my presence and at my direction.   Furthermore, the content and accuracy of this note have been reviewed by mindi Austin MD).  9/19/2019 3:33 PM

## 2019-09-19 ENCOUNTER — OFFICE VISIT (OUTPATIENT)
Dept: CARDIOLOGY CLINIC | Age: 84
End: 2019-09-19
Payer: MEDICARE

## 2019-09-19 VITALS
BODY MASS INDEX: 31.81 KG/M2 | SYSTOLIC BLOOD PRESSURE: 130 MMHG | HEIGHT: 73 IN | HEART RATE: 62 BPM | WEIGHT: 240 LBS | DIASTOLIC BLOOD PRESSURE: 72 MMHG | OXYGEN SATURATION: 98 %

## 2019-09-19 DIAGNOSIS — I10 ESSENTIAL HYPERTENSION: ICD-10-CM

## 2019-09-19 DIAGNOSIS — E66.9 OBESITY (BMI 30-39.9): ICD-10-CM

## 2019-09-19 DIAGNOSIS — I25.10 CORONARY ARTERY DISEASE INVOLVING NATIVE CORONARY ARTERY OF NATIVE HEART WITHOUT ANGINA PECTORIS: Primary | ICD-10-CM

## 2019-09-19 DIAGNOSIS — I35.1 NONRHEUMATIC AORTIC VALVE INSUFFICIENCY: ICD-10-CM

## 2019-09-19 PROCEDURE — 99214 OFFICE O/P EST MOD 30 MIN: CPT | Performed by: INTERNAL MEDICINE

## 2019-09-19 PROCEDURE — 1123F ACP DISCUSS/DSCN MKR DOCD: CPT | Performed by: INTERNAL MEDICINE

## 2019-09-19 PROCEDURE — 4040F PNEUMOC VAC/ADMIN/RCVD: CPT | Performed by: INTERNAL MEDICINE

## 2019-09-19 PROCEDURE — G8598 ASA/ANTIPLAT THER USED: HCPCS | Performed by: INTERNAL MEDICINE

## 2019-09-19 PROCEDURE — 1036F TOBACCO NON-USER: CPT | Performed by: INTERNAL MEDICINE

## 2019-09-19 PROCEDURE — G8417 CALC BMI ABV UP PARAM F/U: HCPCS | Performed by: INTERNAL MEDICINE

## 2019-09-19 PROCEDURE — G8427 DOCREV CUR MEDS BY ELIG CLIN: HCPCS | Performed by: INTERNAL MEDICINE

## 2019-09-19 RX ORDER — DOXYCYCLINE HYCLATE 100 MG
100 TABLET ORAL 2 TIMES DAILY
COMMUNITY
End: 2019-12-12 | Stop reason: ALTCHOICE

## 2019-09-19 NOTE — LETTER
Cholesterol in his mother; Other (age of onset: [de-identified]) in his mother. Allergies: Percocet [oxycodone-acetaminophen] and Adhesive tape   ROS:  [x]Full ROS obtained and negative except as mentioned in HPI     MEDICATIONS      Current Outpatient Medications   Medication Sig Dispense Refill    primidone (MYSOLINE) 50 MG tablet Take 1 tablet by mouth daily 90 tablet 3    propranolol (INDERAL) 20 MG tablet TAKE 1 TABLET BY MOUTH DAILY 90 tablet 0    potassium chloride (MICRO-K) 10 MEQ extended release capsule Take 1 capsule by mouth daily 90 capsule 3    levothyroxine (SYNTHROID) 50 MCG tablet Take 1 tablet by mouth Daily 90 tablet 3    donepezil (ARICEPT) 5 MG tablet Take 1 tablet by mouth nightly 90 tablet 3    furosemide (LASIX) 20 MG tablet TAKE 1 TABLET BY MOUTH EVERY DAY 90 tablet 1    Omega-3 Fatty Acids (FISH OIL) 1000 MG CAPS Take 3,000 mg by mouth 3 times daily      vitamin D (CHOLECALCIFEROL) 1000 units TABS tablet Take 1 tablet by mouth daily (Patient taking differently: Take 1,000 Units by mouth ) 30 tablet 0    Coenzyme Q10 (CO Q 10 PO) Take  by mouth.  aspirin 81 MG tablet Take 81 mg by mouth daily.  multivitamin (ANTIOXIDANT;PROSIGHT) TABS per tablet Take 1 tablet by mouth daily. No current facility-administered medications for this visit.       Reviewed with patient and will remain unchanged except as mentioned in A/P  PHYSICAL EXAM     Vitals:    09/19/19 1523   BP: 130/72   Pulse: 62   SpO2: 98%      Gen Alert, coop, no distress Heart  Rrr, 1/6   Head NC, AT, no abnorm Abd  Soft, NT, +BS, no mass, no OM   Eyes PER, conj/corn clear Ext  Ext nl, AT, no C/C/E   Nose Nares nl, no drain, NT Pulse 2+ and symmetric   Throat Lips, mucosa, tongue nl Skin Col/text/turg nl, no vis rash/les   Neck S/S, TM, NT, no bruit/JVD Psych Nl mood and affect   Lung CTA-B, unlabored, no DTP Lymph   No cervical or axillary LA   Ch wall NT, no deform Neuro  Nl gross M/S exam     ASSESSMENT AND PLAN

## 2019-10-01 ENCOUNTER — OFFICE VISIT (OUTPATIENT)
Dept: ENT CLINIC | Age: 84
End: 2019-10-01
Payer: MEDICARE

## 2019-10-01 VITALS — HEART RATE: 68 BPM | SYSTOLIC BLOOD PRESSURE: 128 MMHG | OXYGEN SATURATION: 96 % | DIASTOLIC BLOOD PRESSURE: 70 MMHG

## 2019-10-01 DIAGNOSIS — H61.23 BILATERAL IMPACTED CERUMEN: Primary | ICD-10-CM

## 2019-10-01 PROCEDURE — G8427 DOCREV CUR MEDS BY ELIG CLIN: HCPCS | Performed by: OTOLARYNGOLOGY

## 2019-10-01 PROCEDURE — 4040F PNEUMOC VAC/ADMIN/RCVD: CPT | Performed by: OTOLARYNGOLOGY

## 2019-10-01 PROCEDURE — 69210 REMOVE IMPACTED EAR WAX UNI: CPT | Performed by: OTOLARYNGOLOGY

## 2019-10-01 PROCEDURE — 1123F ACP DISCUSS/DSCN MKR DOCD: CPT | Performed by: OTOLARYNGOLOGY

## 2019-10-01 PROCEDURE — 1036F TOBACCO NON-USER: CPT | Performed by: OTOLARYNGOLOGY

## 2019-10-01 PROCEDURE — G8417 CALC BMI ABV UP PARAM F/U: HCPCS | Performed by: OTOLARYNGOLOGY

## 2019-10-01 PROCEDURE — G8598 ASA/ANTIPLAT THER USED: HCPCS | Performed by: OTOLARYNGOLOGY

## 2019-10-01 PROCEDURE — G8482 FLU IMMUNIZE ORDER/ADMIN: HCPCS | Performed by: OTOLARYNGOLOGY

## 2019-10-02 ENCOUNTER — OFFICE VISIT (OUTPATIENT)
Dept: ORTHOPEDIC SURGERY | Age: 84
End: 2019-10-02
Payer: MEDICARE

## 2019-10-02 VITALS
HEIGHT: 73 IN | SYSTOLIC BLOOD PRESSURE: 149 MMHG | WEIGHT: 249 LBS | BODY MASS INDEX: 33 KG/M2 | DIASTOLIC BLOOD PRESSURE: 69 MMHG | HEART RATE: 42 BPM

## 2019-10-02 DIAGNOSIS — M51.36 DDD (DEGENERATIVE DISC DISEASE), LUMBAR: ICD-10-CM

## 2019-10-02 DIAGNOSIS — M54.16 LUMBAR RADICULOPATHY: Primary | ICD-10-CM

## 2019-10-02 PROCEDURE — G8427 DOCREV CUR MEDS BY ELIG CLIN: HCPCS | Performed by: PHYSICIAN ASSISTANT

## 2019-10-02 PROCEDURE — G8482 FLU IMMUNIZE ORDER/ADMIN: HCPCS | Performed by: PHYSICIAN ASSISTANT

## 2019-10-02 PROCEDURE — G8417 CALC BMI ABV UP PARAM F/U: HCPCS | Performed by: PHYSICIAN ASSISTANT

## 2019-10-02 PROCEDURE — 99203 OFFICE O/P NEW LOW 30 MIN: CPT | Performed by: PHYSICIAN ASSISTANT

## 2019-10-02 PROCEDURE — 1123F ACP DISCUSS/DSCN MKR DOCD: CPT | Performed by: PHYSICIAN ASSISTANT

## 2019-10-02 PROCEDURE — 1036F TOBACCO NON-USER: CPT | Performed by: PHYSICIAN ASSISTANT

## 2019-10-02 PROCEDURE — 4040F PNEUMOC VAC/ADMIN/RCVD: CPT | Performed by: PHYSICIAN ASSISTANT

## 2019-10-02 PROCEDURE — G8598 ASA/ANTIPLAT THER USED: HCPCS | Performed by: PHYSICIAN ASSISTANT

## 2019-10-02 RX ORDER — AMOXICILLIN AND CLAVULANATE POTASSIUM 500; 125 MG/1; MG/1
TABLET, FILM COATED ORAL
Refills: 0 | COMMUNITY
Start: 2019-10-01 | End: 2019-12-12 | Stop reason: ALTCHOICE

## 2019-10-07 ENCOUNTER — TELEPHONE (OUTPATIENT)
Dept: ORTHOPEDIC SURGERY | Age: 84
End: 2019-10-07

## 2019-10-09 RX ORDER — FUROSEMIDE 20 MG/1
TABLET ORAL
Qty: 90 TABLET | Refills: 0 | Status: SHIPPED | OUTPATIENT
Start: 2019-10-09 | End: 2020-01-06

## 2019-10-10 ENCOUNTER — TELEPHONE (OUTPATIENT)
Dept: ORTHOPEDIC SURGERY | Age: 84
End: 2019-10-10

## 2019-10-16 ENCOUNTER — TELEPHONE (OUTPATIENT)
Dept: RHEUMATOLOGY | Age: 84
End: 2019-10-16

## 2019-10-28 ENCOUNTER — TELEPHONE (OUTPATIENT)
Dept: ORTHOPEDIC SURGERY | Age: 84
End: 2019-10-28

## 2019-10-30 RX ORDER — PROPRANOLOL HYDROCHLORIDE 20 MG/1
20 TABLET ORAL DAILY
Qty: 90 TABLET | Refills: 0 | Status: SHIPPED | OUTPATIENT
Start: 2019-10-30 | End: 2020-01-31

## 2019-10-31 ENCOUNTER — TELEPHONE (OUTPATIENT)
Dept: ORTHOPEDIC SURGERY | Age: 84
End: 2019-10-31

## 2019-10-31 DIAGNOSIS — M54.16 LUMBAR RADICULOPATHY: Primary | ICD-10-CM

## 2019-12-12 ENCOUNTER — OFFICE VISIT (OUTPATIENT)
Dept: INTERNAL MEDICINE CLINIC | Age: 84
End: 2019-12-12
Payer: MEDICARE

## 2019-12-12 VITALS
DIASTOLIC BLOOD PRESSURE: 72 MMHG | HEIGHT: 73 IN | BODY MASS INDEX: 33.27 KG/M2 | SYSTOLIC BLOOD PRESSURE: 134 MMHG | WEIGHT: 251 LBS | HEART RATE: 76 BPM

## 2019-12-12 DIAGNOSIS — I10 ESSENTIAL HYPERTENSION: ICD-10-CM

## 2019-12-12 DIAGNOSIS — E03.9 ACQUIRED HYPOTHYROIDISM: ICD-10-CM

## 2019-12-12 DIAGNOSIS — I25.10 CORONARY ARTERY DISEASE INVOLVING NATIVE CORONARY ARTERY OF NATIVE HEART WITHOUT ANGINA PECTORIS: ICD-10-CM

## 2019-12-12 DIAGNOSIS — N17.9 ACUTE KIDNEY INJURY (HCC): Primary | ICD-10-CM

## 2019-12-12 PROCEDURE — 99214 OFFICE O/P EST MOD 30 MIN: CPT | Performed by: NURSE PRACTITIONER

## 2019-12-12 PROCEDURE — 4040F PNEUMOC VAC/ADMIN/RCVD: CPT | Performed by: NURSE PRACTITIONER

## 2019-12-12 PROCEDURE — G8417 CALC BMI ABV UP PARAM F/U: HCPCS | Performed by: NURSE PRACTITIONER

## 2019-12-12 PROCEDURE — G8427 DOCREV CUR MEDS BY ELIG CLIN: HCPCS | Performed by: NURSE PRACTITIONER

## 2019-12-12 PROCEDURE — G8598 ASA/ANTIPLAT THER USED: HCPCS | Performed by: NURSE PRACTITIONER

## 2019-12-12 PROCEDURE — 1123F ACP DISCUSS/DSCN MKR DOCD: CPT | Performed by: NURSE PRACTITIONER

## 2019-12-12 PROCEDURE — 1036F TOBACCO NON-USER: CPT | Performed by: NURSE PRACTITIONER

## 2019-12-12 PROCEDURE — G8482 FLU IMMUNIZE ORDER/ADMIN: HCPCS | Performed by: NURSE PRACTITIONER

## 2019-12-12 ASSESSMENT — ENCOUNTER SYMPTOMS
RESPIRATORY NEGATIVE: 1
GASTROINTESTINAL NEGATIVE: 1

## 2020-01-06 RX ORDER — FUROSEMIDE 20 MG/1
TABLET ORAL
Qty: 90 TABLET | Refills: 3 | Status: SHIPPED | OUTPATIENT
Start: 2020-01-06 | End: 2020-06-02 | Stop reason: ALTCHOICE

## 2020-01-27 ENCOUNTER — TELEPHONE (OUTPATIENT)
Dept: CARDIOLOGY CLINIC | Age: 85
End: 2020-01-27

## 2020-02-03 RX ORDER — PROPRANOLOL HYDROCHLORIDE 20 MG/1
20 TABLET ORAL DAILY
Qty: 90 TABLET | Refills: 3 | Status: SHIPPED | OUTPATIENT
Start: 2020-02-03 | End: 2021-02-01

## 2020-03-31 NOTE — PROGRESS NOTES
[address]     H+P // CONSULT // OUTPATIENT VISIT // FOLLOWUP VISIT     Referring Doctor FAIZAN Santiago - CNP   Encounter Type Followup     CHIEF COMPLAINT     Visit Type Chronic   Symptoms None   Problems CAD, AI, HTN, Obesity     HISTORY OF PRESENT ILLNESS      GEN - Doing well. No new concerns.  CAD - Denies cp, sob, dizziness, syncope, palpitations.  AI  - historically mild. No sob.  HTN - Ambulatory BP readings in good range. No HA or dizziness.  OBESITY - remains overweight with noncompliance with diet and exercise.  MED - Compliant with CV meds listed below without notable side effects. HISTORY/ALLERGIES/ROS     MedHx:   has a past medical history of Bilateral cataracts, Bladder stones, Blepharitis, Cancer ([address] Hypertension, Kidney stones, Obesity, Pneumonia, and SOB (shortness of breath). SurgHx:  has a past surgical history that includes Skin cancer excision; Cholecystectomy; Prostatectomy; Colonoscopy; Cystocopy (8/13/12); other surgical history (8/25/12); and eye surgery. SocHx:   reports that he quit smoking about 35 years ago. He has a 38.00 pack-year smoking history. He has never used smokeless tobacco. He reports that he does not drink alcohol or use drugs. FamHx:  family history includes Bleeding Prob (age of onset: 47) in his father; Dementia in his mother; High Blood Pressure in his mother; High Cholesterol in his mother; Other (age of onset: [de-identified]) in his mother.    Allergies: Percocet [oxycodone-acetaminophen] and Adhesive tape   ROS:  [x]Full ROS obtained and negative except as mentioned in HPI     MEDICATIONS      Current Outpatient Medications   Medication Sig Dispense Refill    propranolol (INDERAL) 20 MG tablet TAKE 1 TABLET BY MOUTH DAILY 90 tablet 3    furosemide (LASIX) 20 MG tablet TAKE 1 TABLET BY MOUTH EVERY DAY 90 tablet 3    Calcium Carb-Cholecalciferol (CALCIUM/VITAMIN D PO) Take by mouth 1200 mg qd      primidone (MYSOLINE) 50 MG tablet Take 1 tablet by mouth daily 90 tablet 3    potassium chloride (MICRO-K) 10 MEQ extended release capsule Take 1 capsule by mouth daily 90 capsule 3    levothyroxine (SYNTHROID) 50 MCG tablet Take 1 tablet by mouth Daily 90 tablet 3    donepezil (ARICEPT) 5 MG tablet Take 1 tablet by mouth nightly 90 tablet 3    aspirin 81 MG tablet Take 81 mg by mouth daily.  multivitamin (ANTIOXIDANT;PROSIGHT) TABS per tablet Take 1 tablet by mouth daily. No current facility-administered medications for this visit. Reviewed with patient and will remain unchanged except as mentioned in A/P  PHYSICAL EXAM     There were no vitals filed for this visit. ***  ASSESSMENT AND PLAN     ~CAD   Date EF Results   Sx   No concerning   [address]   []I         [x]II           []III          []IV   [facility]   Patient declined   MPI 3/12  Mixed inferior I/S   TTE 8/16 55% Mild AI, mild to mod TR   Plan   Continue aggressive medical treatment at doses above  Refuses heart cath for abnormal stress   ~AI   Date EF Detail   Sx   No concerning   TTE 8/16 55% Mild AI   Plan   Continued observation   ~HTN  Today BP Controlled   Counseling Counseled on diet/salt, exercise and ideal body weight   Plan Continue current meds at doses above   ~Obesity  BMI Body mass index is 31.66 kg/m².     Plan Counseled on diet, exercise, weight loss options in detail   ~Compliance  Discussed importance of compliance with meds/diet/salt/exercise; avoidance of tobacco/alcohol/drugs  Plan Patient expressed understanding  ~Followup  Interval: ***      Scribe Attestation  IChilo, am scribing for and in the presence of Jethro Duenas MD.   Signed, Chilo Garcia 03/31/20 8:49 AM   ***

## 2020-04-02 ENCOUNTER — OFFICE VISIT (OUTPATIENT)
Dept: CARDIOLOGY CLINIC | Age: 85
End: 2020-04-02
Payer: MEDICARE

## 2020-04-02 VITALS
WEIGHT: 242 LBS | DIASTOLIC BLOOD PRESSURE: 82 MMHG | SYSTOLIC BLOOD PRESSURE: 154 MMHG | HEART RATE: 65 BPM | BODY MASS INDEX: 31.93 KG/M2

## 2020-04-02 PROCEDURE — G8427 DOCREV CUR MEDS BY ELIG CLIN: HCPCS | Performed by: INTERNAL MEDICINE

## 2020-04-02 PROCEDURE — G8417 CALC BMI ABV UP PARAM F/U: HCPCS | Performed by: INTERNAL MEDICINE

## 2020-04-02 PROCEDURE — 99214 OFFICE O/P EST MOD 30 MIN: CPT | Performed by: INTERNAL MEDICINE

## 2020-04-02 PROCEDURE — 1036F TOBACCO NON-USER: CPT | Performed by: INTERNAL MEDICINE

## 2020-04-02 PROCEDURE — 4040F PNEUMOC VAC/ADMIN/RCVD: CPT | Performed by: INTERNAL MEDICINE

## 2020-04-02 PROCEDURE — 1123F ACP DISCUSS/DSCN MKR DOCD: CPT | Performed by: INTERNAL MEDICINE

## 2020-04-02 RX ORDER — AMLODIPINE BESYLATE 5 MG/1
5 TABLET ORAL DAILY
Qty: 90 TABLET | Refills: 3 | Status: SHIPPED | OUTPATIENT
Start: 2020-04-02 | End: 2020-04-13

## 2020-04-13 ENCOUNTER — TELEPHONE (OUTPATIENT)
Dept: CARDIOLOGY CLINIC | Age: 85
End: 2020-04-13

## 2020-04-13 RX ORDER — AMLODIPINE BESYLATE 5 MG/1
10 TABLET ORAL DAILY
Qty: 90 TABLET | Refills: 3
Start: 2020-04-13 | End: 2020-05-18 | Stop reason: SDUPTHER

## 2020-04-17 ENCOUNTER — TELEPHONE (OUTPATIENT)
Dept: INTERNAL MEDICINE CLINIC | Age: 85
End: 2020-04-17

## 2020-04-17 NOTE — TELEPHONE ENCOUNTER
Patient's wife states that she already told someone she doesn't have access to do a video visit and just wants to confirm her appointment on 4/22 is a telephone call appointment, please advise.

## 2020-04-18 ENCOUNTER — TELEPHONE (OUTPATIENT)
Dept: CARDIOLOGY CLINIC | Age: 85
End: 2020-04-18

## 2020-04-22 ENCOUNTER — VIRTUAL VISIT (OUTPATIENT)
Dept: INTERNAL MEDICINE CLINIC | Age: 85
End: 2020-04-22
Payer: MEDICARE

## 2020-04-22 PROCEDURE — 99442 PR PHYS/QHP TELEPHONE EVALUATION 11-20 MIN: CPT | Performed by: NURSE PRACTITIONER

## 2020-05-18 ENCOUNTER — VIRTUAL VISIT (OUTPATIENT)
Dept: INTERNAL MEDICINE CLINIC | Age: 85
End: 2020-05-18
Payer: MEDICARE

## 2020-05-18 ENCOUNTER — NURSE TRIAGE (OUTPATIENT)
Dept: OTHER | Facility: CLINIC | Age: 85
End: 2020-05-18

## 2020-05-18 VITALS
WEIGHT: 240 LBS | SYSTOLIC BLOOD PRESSURE: 123 MMHG | DIASTOLIC BLOOD PRESSURE: 57 MMHG | BODY MASS INDEX: 31.81 KG/M2 | HEIGHT: 73 IN | HEART RATE: 60 BPM

## 2020-05-18 PROCEDURE — 99442 PR PHYS/QHP TELEPHONE EVALUATION 11-20 MIN: CPT | Performed by: NURSE PRACTITIONER

## 2020-05-18 RX ORDER — AMLODIPINE BESYLATE 10 MG/1
10 TABLET ORAL DAILY
Qty: 90 TABLET | Refills: 3 | Status: SHIPPED | OUTPATIENT
Start: 2020-05-18 | End: 2021-11-08

## 2020-05-18 RX ORDER — CLINDAMYCIN HYDROCHLORIDE 300 MG/1
300 CAPSULE ORAL 3 TIMES DAILY
Qty: 21 CAPSULE | Refills: 0 | Status: SHIPPED | OUTPATIENT
Start: 2020-05-18 | End: 2020-05-25 | Stop reason: SDUPTHER

## 2020-05-18 ASSESSMENT — PATIENT HEALTH QUESTIONNAIRE - PHQ9
1. LITTLE INTEREST OR PLEASURE IN DOING THINGS: 0
SUM OF ALL RESPONSES TO PHQ QUESTIONS 1-9: 0
SUM OF ALL RESPONSES TO PHQ QUESTIONS 1-9: 0
2. FEELING DOWN, DEPRESSED OR HOPELESS: 0
SUM OF ALL RESPONSES TO PHQ9 QUESTIONS 1 & 2: 0

## 2020-05-18 NOTE — TELEPHONE ENCOUNTER
Pt scheduled with Nel Alford.
the 90's  9. RECURRENT SYMPTOM: \"Have you had leg swelling before? \" If so, ask: \"When was the last time? \" \"What happened that time? \"      denies  10. OTHER SYMPTOMS: \"Do you have any other symptoms? \" (e.g., chest pain, difficulty breathing)        denies  11. PREGNANCY: \"Is there any chance you are pregnant? \" \"When was your last menstrual period? \"        NA    Protocols used: LEG SWELLING AND EDEMA-ADULT-OH    Patient called [address] [place] [address] pre-service center [facility]) to schedule appointment, with red flag complaint, transferred to RN access for triage. See above questions and answers. Caller talking full sentences without any distress on phone. Discussed disposition and patient agreeable. Discussed potential consequences for not following disposition recommendation. Aware to call back with with any concerns or persistent, worsening, or new symptoms develop. Contacted [facility] for second level triage. Recommendation Appointment this morning with PCP. Warm transfer to Nyasia Ruvalcaba scheduling for appointment. Please do not respond to the triage nurse through this encounter. Any subsequent communication should be directly with the patient.

## 2020-05-18 NOTE — PROGRESS NOTES
minutes    Note: not billable if this call serves to triage the patient into an appointment for the relevant concern    [address]

## 2020-05-20 ENCOUNTER — OFFICE VISIT (OUTPATIENT)
Dept: INTERNAL MEDICINE CLINIC | Age: 85
End: 2020-05-20
Payer: MEDICARE

## 2020-05-20 VITALS
HEIGHT: 73 IN | HEART RATE: 60 BPM | WEIGHT: 255.4 LBS | BODY MASS INDEX: 33.85 KG/M2 | TEMPERATURE: 97.4 F | SYSTOLIC BLOOD PRESSURE: 140 MMHG | DIASTOLIC BLOOD PRESSURE: 68 MMHG

## 2020-05-20 DIAGNOSIS — N17.9 ACUTE KIDNEY INJURY (HCC): ICD-10-CM

## 2020-05-20 PROBLEM — M54.16 LUMBAR RADICULOPATHY: Status: ACTIVE | Noted: 2019-11-20

## 2020-05-20 PROBLEM — M51.36 DEGENERATION OF INTERVERTEBRAL DISC OF LUMBAR REGION: Status: ACTIVE | Noted: 2019-11-20

## 2020-05-20 LAB
ALBUMIN SERPL-MCNC: 4.2 G/DL (ref 3.4–5)
ANION GAP SERPL CALCULATED.3IONS-SCNC: 12 MMOL/L (ref 3–16)
BUN BLDV-MCNC: 18 MG/DL (ref 7–20)
CALCIUM SERPL-MCNC: 8.8 MG/DL (ref 8.3–10.6)
CHLORIDE BLD-SCNC: 103 MMOL/L (ref 99–110)
CO2: 24 MMOL/L (ref 21–32)
CREAT SERPL-MCNC: 1.1 MG/DL (ref 0.8–1.3)
GFR AFRICAN AMERICAN: >60
GFR NON-AFRICAN AMERICAN: >60
GLUCOSE BLD-MCNC: 100 MG/DL (ref 70–99)
PHOSPHORUS: 3.2 MG/DL (ref 2.5–4.9)
POTASSIUM SERPL-SCNC: 4.3 MMOL/L (ref 3.5–5.1)
SODIUM BLD-SCNC: 139 MMOL/L (ref 136–145)

## 2020-05-20 PROCEDURE — 1123F ACP DISCUSS/DSCN MKR DOCD: CPT | Performed by: NURSE PRACTITIONER

## 2020-05-20 PROCEDURE — 1036F TOBACCO NON-USER: CPT | Performed by: NURSE PRACTITIONER

## 2020-05-20 PROCEDURE — 99214 OFFICE O/P EST MOD 30 MIN: CPT | Performed by: NURSE PRACTITIONER

## 2020-05-20 PROCEDURE — G8417 CALC BMI ABV UP PARAM F/U: HCPCS | Performed by: NURSE PRACTITIONER

## 2020-05-20 PROCEDURE — G8427 DOCREV CUR MEDS BY ELIG CLIN: HCPCS | Performed by: NURSE PRACTITIONER

## 2020-05-20 PROCEDURE — 4040F PNEUMOC VAC/ADMIN/RCVD: CPT | Performed by: NURSE PRACTITIONER

## 2020-05-20 ASSESSMENT — ENCOUNTER SYMPTOMS
CHEST TIGHTNESS: 0
WHEEZING: 0
SHORTNESS OF BREATH: 0
COUGH: 0

## 2020-05-20 NOTE — PROGRESS NOTES
Respiratory: Negative for cough, chest tightness, shortness of breath and wheezing. Cardiovascular: Positive for leg swelling. Negative for chest pain and palpitations. Skin: Positive for rash. Neurological: Negative for dizziness, tremors, light-headedness and headaches. Vitals:    05/20/20 0956   BP: (!) 140/68   Site: Left Upper Arm   Position: Sitting   Cuff Size: Medium Adult   Pulse: 60   Temp: 97.4 °F (36.3 °C)   TempSrc: Temporal   Weight: 255 lb 6.4 oz (115.8 kg)   Height: 6' 1\" (1.854 m)      Physical Exam  Vitals signs reviewed. Constitutional:       General: He is not in acute distress. Appearance: He is well-developed. He is not ill-appearing or diaphoretic. HENT:      Head: Normocephalic and atraumatic. Cardiovascular:      Rate and Rhythm: Normal rate and regular rhythm. Heart sounds: Normal heart sounds. No murmur. Pulmonary:      Effort: Pulmonary effort is normal. No respiratory distress. Breath sounds: Normal breath sounds. No wheezing or rhonchi. Musculoskeletal:      Right lower leg: Edema present. Left lower leg: Edema present. Skin:     General: Skin is warm and dry. Findings: Erythema present. No bruising. Comments: Bilateral legs warm, erythema, and edema. From mid-upper calf to ankles. Improving per patient   Neurological:      General: No focal deficit present. Mental Status: He is alert and oriented to person, place, and time. Psychiatric:         Mood and Affect: Mood and affect normal.         Behavior: Behavior normal.       Assessment/Plan     1. Cellulitis of lower extremity, unspecified laterality  Stable, improving  Continue antibiotic therapy  Reviewed wearing compression stockings  Discussed criteria to follow up    2. Localized edema  See #1  Pt reports edema is improving with abx     3. Acute kidney injury ([facility] [address]  Stable, checking labs  - Renal Function Panel; Future    4.  Degeneration of intervertebral disc of lumbar

## 2020-05-25 ENCOUNTER — TELEPHONE (OUTPATIENT)
Dept: INTERNAL MEDICINE CLINIC | Age: 85
End: 2020-05-25

## 2020-05-25 RX ORDER — CLINDAMYCIN HYDROCHLORIDE 300 MG/1
300 CAPSULE ORAL 3 TIMES DAILY
Qty: 21 CAPSULE | Refills: 0 | Status: SHIPPED | OUTPATIENT
Start: 2020-05-25 | End: 2020-06-01

## 2020-06-02 ENCOUNTER — TELEPHONE (OUTPATIENT)
Dept: INTERNAL MEDICINE CLINIC | Age: 85
End: 2020-06-02

## 2020-06-02 ENCOUNTER — OFFICE VISIT (OUTPATIENT)
Dept: INTERNAL MEDICINE CLINIC | Age: 85
End: 2020-06-02
Payer: MEDICARE

## 2020-06-02 ENCOUNTER — NURSE TRIAGE (OUTPATIENT)
Dept: OTHER | Facility: CLINIC | Age: 85
End: 2020-06-02

## 2020-06-02 ENCOUNTER — TELEPHONE (OUTPATIENT)
Dept: CARDIOLOGY | Age: 85
End: 2020-06-02

## 2020-06-02 VITALS
HEART RATE: 64 BPM | DIASTOLIC BLOOD PRESSURE: 60 MMHG | TEMPERATURE: 98.5 F | SYSTOLIC BLOOD PRESSURE: 128 MMHG | BODY MASS INDEX: 33.25 KG/M2 | WEIGHT: 252 LBS

## 2020-06-02 PROCEDURE — G8427 DOCREV CUR MEDS BY ELIG CLIN: HCPCS | Performed by: NURSE PRACTITIONER

## 2020-06-02 PROCEDURE — G8417 CALC BMI ABV UP PARAM F/U: HCPCS | Performed by: NURSE PRACTITIONER

## 2020-06-02 PROCEDURE — 4040F PNEUMOC VAC/ADMIN/RCVD: CPT | Performed by: NURSE PRACTITIONER

## 2020-06-02 PROCEDURE — 1036F TOBACCO NON-USER: CPT | Performed by: NURSE PRACTITIONER

## 2020-06-02 PROCEDURE — 99213 OFFICE O/P EST LOW 20 MIN: CPT | Performed by: NURSE PRACTITIONER

## 2020-06-02 PROCEDURE — 1123F ACP DISCUSS/DSCN MKR DOCD: CPT | Performed by: NURSE PRACTITIONER

## 2020-06-02 RX ORDER — TORSEMIDE 20 MG/1
20 TABLET ORAL DAILY
Qty: 30 TABLET | Refills: 5 | Status: SHIPPED | OUTPATIENT
Start: 2020-06-02 | End: 2020-11-16 | Stop reason: SDUPTHER

## 2020-06-02 NOTE — TELEPHONE ENCOUNTER
Pts wife called in concerned with continued swelling/redness of bilateral legs. States has been on Cipro x 2 weeks (for cellulitis) with some decrease in redness but not the swelling. Is planning on using ACE wraps as suggested (as compression hose are too hard to get on). Denies increased SOB. States weight is up but has been \"creeping up for the past 6 months\". //71. Discussed with Dr. Manan Lutz. Will stop Lasix and begin Torsemide 20mg daily beginning 6/3/20. Aware to get BMP in 1 week. Pts wife verbalized understanding of all.  Beny DIALLO

## 2020-06-02 NOTE — PROGRESS NOTES
SUBJECTIVE:    Patient ID: Bri Noriega is a 80 y.o. male. CC: Leg swelling    HPI: The patient presents to the office for an acute visit. Bilateral lower extremity swelling. Previous contacted cardiology and lasix changed to Demadex. Not using compression hose. Admits they are eating a lot of premade foods likely high in salt. Current Outpatient Medications   Medication Sig Dispense Refill    torsemide (DEMADEX) 20 MG tablet Take 1 tablet by mouth daily 30 tablet 5    amLODIPine (NORVASC) 10 MG tablet Take 1 tablet by mouth daily 90 tablet 3    propranolol (INDERAL) 20 MG tablet TAKE 1 TABLET BY MOUTH DAILY 90 tablet 3    Calcium Carb-Cholecalciferol (CALCIUM/VITAMIN D PO) Take by mouth 1200 mg qd      primidone (MYSOLINE) 50 MG tablet Take 1 tablet by mouth daily 90 tablet 3    potassium chloride (MICRO-K) 10 MEQ extended release capsule Take 1 capsule by mouth daily 90 capsule 3    levothyroxine (SYNTHROID) 50 MCG tablet Take 1 tablet by mouth Daily 90 tablet 3    donepezil (ARICEPT) 5 MG tablet Take 1 tablet by mouth nightly 90 tablet 3    aspirin 81 MG tablet Take 81 mg by mouth daily.  multivitamin (ANTIOXIDANT;PROSIGHT) TABS per tablet Take 1 tablet by mouth daily. No current facility-administered medications for this visit. Review of Systems   Constitutional: Negative for fever. Respiratory: Negative for shortness of breath. Cardiovascular: Positive for leg swelling. Negative for chest pain. Musculoskeletal: Negative. OBJECTIVE:  Physical Exam  Constitutional:       Appearance: Normal appearance. HENT:      Head: Normocephalic and atraumatic. Cardiovascular:      Rate and Rhythm: Normal rate and regular rhythm. Pulmonary:      Effort: Pulmonary effort is normal.      Breath sounds: Normal breath sounds. Musculoskeletal:      Right lower leg: Edema (1-2+) present. Left lower leg: Edema (2-3+) present.    Skin:     General: Skin

## 2020-06-03 ASSESSMENT — ENCOUNTER SYMPTOMS: SHORTNESS OF BREATH: 0

## 2020-06-08 RX ORDER — DONEPEZIL HYDROCHLORIDE 5 MG/1
TABLET, FILM COATED ORAL
Qty: 90 TABLET | Refills: 3 | Status: SHIPPED | OUTPATIENT
Start: 2020-06-08 | End: 2021-07-06

## 2020-06-24 RX ORDER — POTASSIUM CHLORIDE 750 MG/1
10 CAPSULE, EXTENDED RELEASE ORAL DAILY
Qty: 90 CAPSULE | Refills: 3 | Status: SHIPPED | OUTPATIENT
Start: 2020-06-24 | End: 2020-10-15

## 2020-07-23 ENCOUNTER — OFFICE VISIT (OUTPATIENT)
Dept: INTERNAL MEDICINE CLINIC | Age: 85
End: 2020-07-23
Payer: MEDICARE

## 2020-07-23 VITALS
HEART RATE: 60 BPM | TEMPERATURE: 97.3 F | SYSTOLIC BLOOD PRESSURE: 134 MMHG | WEIGHT: 253 LBS | DIASTOLIC BLOOD PRESSURE: 74 MMHG | BODY MASS INDEX: 33.53 KG/M2 | HEIGHT: 73 IN

## 2020-07-23 PROCEDURE — 1123F ACP DISCUSS/DSCN MKR DOCD: CPT | Performed by: NURSE PRACTITIONER

## 2020-07-23 PROCEDURE — G8427 DOCREV CUR MEDS BY ELIG CLIN: HCPCS | Performed by: NURSE PRACTITIONER

## 2020-07-23 PROCEDURE — 4040F PNEUMOC VAC/ADMIN/RCVD: CPT | Performed by: NURSE PRACTITIONER

## 2020-07-23 PROCEDURE — G8417 CALC BMI ABV UP PARAM F/U: HCPCS | Performed by: NURSE PRACTITIONER

## 2020-07-23 PROCEDURE — 1036F TOBACCO NON-USER: CPT | Performed by: NURSE PRACTITIONER

## 2020-07-23 PROCEDURE — 99214 OFFICE O/P EST MOD 30 MIN: CPT | Performed by: NURSE PRACTITIONER

## 2020-07-23 NOTE — PROGRESS NOTES
SUBJECTIVE:    Patient ID: José Mclain is a 80 y.o. male. CC: Hypertension, atrial fibrillation, coronary artery disease, hypothyroidism    HPI: Patient presents to the office today for follow-up of chronic medical conditions. He has no new specific acute complaint today. He has a history of coronary artery disease, paroxysmal atrial fibrillation, and hypertension. He denies any chest pain or shortness of breath. He is compliant with his medication regimen. He has a history of hypothyroidism. He is on Synthroid. He is compliant with his medication regimen. He reports his tremor is stable. Past Medical History:   Diagnosis Date    Bilateral cataracts     Bladder stones     Blepharitis     Cancer ([facility] [address]     prostate    Hypertension     Kidney stones     with bladder stones    Obesity     Pneumonia     SOB (shortness of breath)         Current Outpatient Medications   Medication Sig Dispense Refill    potassium chloride (MICRO-K) 10 MEQ extended release capsule TAKE 1 CAPSULE BY MOUTH DAILY 90 capsule 3    donepezil (ARICEPT) 5 MG tablet TAKE 1 TABLET BY MOUTH EVERY NIGHT 90 tablet 3    torsemide (DEMADEX) 20 MG tablet Take 1 tablet by mouth daily 30 tablet 5    amLODIPine (NORVASC) 10 MG tablet Take 1 tablet by mouth daily 90 tablet 3    propranolol (INDERAL) 20 MG tablet TAKE 1 TABLET BY MOUTH DAILY 90 tablet 3    Calcium Carb-Cholecalciferol (CALCIUM/VITAMIN D PO) Take by mouth 1200 mg qd      primidone (MYSOLINE) 50 MG tablet Take 1 tablet by mouth daily 90 tablet 3    levothyroxine (SYNTHROID) 50 MCG tablet Take 1 tablet by mouth Daily 90 tablet 3    aspirin 81 MG tablet Take 81 mg by mouth daily.  multivitamin (ANTIOXIDANT;PROSIGHT) TABS per tablet Take 1 tablet by mouth daily. No current facility-administered medications for this visit. Review of Systems   Constitutional: Negative. Respiratory: Negative. Cardiovascular: Negative. extremity with intermittent claudication, unspecified laterality ([facility] [address]  -Some generalized weakness which she associates to aging.   No specific lower extremity complaints today  -Continue current regimen    Coronary artery disease involving native coronary artery of native heart without angina pectoris  -No chest pain  -Continue current regimen    Acquired hypothyroidism  -Last TSH 4.9  -Monitor and adjust as needed    Tremor  -Chronic, stable  -Continue current regimen      FAIZAN Jorge - CNP

## 2020-07-24 ASSESSMENT — ENCOUNTER SYMPTOMS
GASTROINTESTINAL NEGATIVE: 1
RESPIRATORY NEGATIVE: 1

## 2020-08-04 RX ORDER — LEVOTHYROXINE SODIUM 0.05 MG/1
50 TABLET ORAL DAILY
Qty: 90 TABLET | Refills: 3 | Status: SHIPPED | OUTPATIENT
Start: 2020-08-04 | End: 2020-10-26

## 2020-08-31 RX ORDER — PRIMIDONE 50 MG/1
50 TABLET ORAL DAILY
Qty: 90 TABLET | Refills: 3 | Status: SHIPPED | OUTPATIENT
Start: 2020-08-31 | End: 2021-08-17

## 2020-09-30 ENCOUNTER — TELEPHONE (OUTPATIENT)
Dept: INTERNAL MEDICINE CLINIC | Age: 85
End: 2020-09-30

## 2020-09-30 RX ORDER — POTASSIUM CHLORIDE 750 MG/1
10 TABLET, EXTENDED RELEASE ORAL DAILY
Qty: 90 TABLET | Refills: 3 | Status: SHIPPED | OUTPATIENT
Start: 2020-09-30 | End: 2021-10-01

## 2020-09-30 NOTE — TELEPHONE ENCOUNTER
----- Message from Jhonny Ledezma sent at 9/30/2020  3:08 PM EDT -----  Subject: Message to Provider    QUESTIONS  Information for Provider? Patient needs to speak with physician or Jose Luis Wilson   (CNP) about his Rx potassium chloride he has questions.   ---------------------------------------------------------------------------  --------------  CALL BACK INFO  What is the best way for the office to contact you? OK to leave message on   voicemail  Preferred Call Back Phone Number? [phone]  ---------------------------------------------------------------------------  --------------  SCRIPT ANSWERS  Relationship to Patient?  Self

## 2020-10-06 ENCOUNTER — OFFICE VISIT (OUTPATIENT)
Dept: ENT CLINIC | Age: 85
End: 2020-10-06
Payer: MEDICARE

## 2020-10-06 VITALS — TEMPERATURE: 97.1 F | HEART RATE: 73 BPM | SYSTOLIC BLOOD PRESSURE: 128 MMHG | DIASTOLIC BLOOD PRESSURE: 78 MMHG

## 2020-10-06 PROCEDURE — 1123F ACP DISCUSS/DSCN MKR DOCD: CPT | Performed by: OTOLARYNGOLOGY

## 2020-10-06 PROCEDURE — G8427 DOCREV CUR MEDS BY ELIG CLIN: HCPCS | Performed by: OTOLARYNGOLOGY

## 2020-10-06 PROCEDURE — G8417 CALC BMI ABV UP PARAM F/U: HCPCS | Performed by: OTOLARYNGOLOGY

## 2020-10-06 PROCEDURE — 69210 REMOVE IMPACTED EAR WAX UNI: CPT | Performed by: OTOLARYNGOLOGY

## 2020-10-06 PROCEDURE — G8484 FLU IMMUNIZE NO ADMIN: HCPCS | Performed by: OTOLARYNGOLOGY

## 2020-10-06 PROCEDURE — 1036F TOBACCO NON-USER: CPT | Performed by: OTOLARYNGOLOGY

## 2020-10-06 PROCEDURE — 4040F PNEUMOC VAC/ADMIN/RCVD: CPT | Performed by: OTOLARYNGOLOGY

## 2020-10-13 NOTE — PROGRESS NOTES
[address]     H+P // CONSULT // OUTPATIENT VISIT // FOLLOWUP VISIT     Referring Doctor FAIZAN Olguin - CNP   Encounter Type Followup     CHIEF COMPLAINT     Visit Type Chronic   Symptoms None   Problems CAD, AI, HTN, Obesity     HISTORY OF PRESENT ILLNESS      GEN - Doing well. No new concerns. BP running high at home   CAD - Denies cp, sob, dizziness, syncope, palpitations.  AI  - historically mild. No sob.  HTN - Ambulatory BP good range. No HA or dizziness.  OBESITY - remains overweight with noncompliance with diet and exercise.  MED - Compliant with CV meds listed below without notable side effects. HISTORY/ALLERGIES/ROS     MedHx:  has a past medical history of Bilateral cataracts, Bladder stones, Blepharitis, Cancer ([address] Hypertension, Kidney stones, Obesity, Pneumonia, and SOB (shortness of breath). SurgHx:  has a past surgical history that includes Skin cancer excision; Cholecystectomy; Prostatectomy; Colonoscopy; Cystocopy (8/13/12); other surgical history (8/25/12); and eye surgery. SocHx:  reports that he quit smoking about 36 years ago. He has a 38.00 pack-year smoking history. He has never used smokeless tobacco. He reports that he does not drink alcohol or use drugs. FamHx: family history includes Bleeding Prob (age of onset: 47) in his father; Dementia in his mother; High Blood Pressure in his mother; High Cholesterol in his mother; Other (age of onset: [de-identified]) in his mother.    Allerg: Oxycodone-acetaminophen and Adhesive tape   ROS: [x]Full ROS obtained and negative except as mentioned in HPI     MEDICATIONS      Current Outpatient Medications   Medication Sig Dispense Refill    potassium chloride (KLOR-CON M) 10 MEQ extended release tablet Take 1 tablet by mouth daily 90 tablet 3    primidone (MYSOLINE) 50 MG tablet TAKE 1 TABLET BY MOUTH DAILY 90 tablet 3    levothyroxine (SYNTHROID) 50 MCG tablet TAKE 1 TABLET BY MOUTH DAILY 90 tablet 3    potassium chloride (MICRO-K) 10 MEQ extended release capsule TAKE 1 CAPSULE BY MOUTH DAILY 90 capsule 3    donepezil (ARICEPT) 5 MG tablet TAKE 1 TABLET BY MOUTH EVERY NIGHT 90 tablet 3    torsemide (DEMADEX) 20 MG tablet Take 1 tablet by mouth daily 30 tablet 5    amLODIPine (NORVASC) 10 MG tablet Take 1 tablet by mouth daily 90 tablet 3    propranolol (INDERAL) 20 MG tablet TAKE 1 TABLET BY MOUTH DAILY 90 tablet 3    Calcium Carb-Cholecalciferol (CALCIUM/VITAMIN D PO) Take by mouth 1200 mg qd      aspirin 81 MG tablet Take 81 mg by mouth daily.  multivitamin (ANTIOXIDANT;PROSIGHT) TABS per tablet Take 1 tablet by mouth daily. No current facility-administered medications for this visit. Reviewed with patient and will remain unchanged except as mentioned in A/P  PHYSICAL EXAM     Vitals:    10/15/20 1036   BP: 132/66   Pulse: 60   Resp: 20   SpO2: 97%      Gen Alert, coop, no distress Heart  Rrr, no mrg   Head NC, AT, no abnorm Abd  Soft, NT, +BS, no mass, no OM   Eyes PER, conj/corn clear Ext  Ext nl, AT, no C/C/E   Nose Nares nl, no drain, NT Pulse 2+ and symmetric   Throat Lips, mucosa, tongue nl Skin Col/text/turg nl, no vis rash/les   Neck S/S, TM, NT, no bruit/JVD Psych Nl mood and affect   Lung CTA-B, unlabored, no DTP Lymph   No cervical or axillary LA   Ch wall NT, no deform Neuro  Nl gross M/S exam     ASSESSMENT AND PLAN     *CAD   Date EF Results   Sx   No concerning   LHC   Patient declined   MPI 3/12  Mixed inferior I/S   TTE 8/16 55% Mild AI, mild to mod TR   Plan   Continue aggressive medical treatment at doses above  Refuses heart cath for abnormal stress   *AI   Date EF Detail   Sx   No concerning   TTE 8/16 55% Mild AI   Plan   Continued observation   *HTN  Status Controlled  Plan Counseled on diet/salt/exercise/weight, continue meds at doses above  *OBESITY  Status Uncontrolled with a BMI of Body mass index is 32.06 kg/m².   Plan Counseled on diet, exercise, weight loss options in detail  *COMPLIANCE  Status Compliant  Plan Discussed importance of compliance with meds/diet/salt/exercise; avoid tob/alc/drugs; patient verbalized understanding  *FOLLOWUP  6 months    [address] Shavon Tsang, am scribing for and in the presence of Sharon Mosley MD.   SignedShavon 10/13/20 9:18 AM   Provider Agustín Wesley is working as a scribe for and in the presence of mindi Mosley MD). Working as a scribe, Shavon Haley may have prepopulated components of this note with my historical  intellectual property under my direct supervision. Any additions to this intellectual property were performed in my presence and at my direction. Furthermore, the content and accuracy of this note have been reviewed by mindi Mosley MD).   10/15/2020 12:07 PM

## 2020-10-15 ENCOUNTER — OFFICE VISIT (OUTPATIENT)
Dept: CARDIOLOGY CLINIC | Age: 85
End: 2020-10-15
Payer: MEDICARE

## 2020-10-15 VITALS
OXYGEN SATURATION: 97 % | HEART RATE: 60 BPM | SYSTOLIC BLOOD PRESSURE: 132 MMHG | HEIGHT: 73 IN | RESPIRATION RATE: 20 BRPM | BODY MASS INDEX: 32.2 KG/M2 | DIASTOLIC BLOOD PRESSURE: 66 MMHG | WEIGHT: 243 LBS

## 2020-10-15 PROCEDURE — 99214 OFFICE O/P EST MOD 30 MIN: CPT | Performed by: INTERNAL MEDICINE

## 2020-10-15 PROCEDURE — 1036F TOBACCO NON-USER: CPT | Performed by: INTERNAL MEDICINE

## 2020-10-15 PROCEDURE — 1123F ACP DISCUSS/DSCN MKR DOCD: CPT | Performed by: INTERNAL MEDICINE

## 2020-10-15 PROCEDURE — G8417 CALC BMI ABV UP PARAM F/U: HCPCS | Performed by: INTERNAL MEDICINE

## 2020-10-15 PROCEDURE — G8484 FLU IMMUNIZE NO ADMIN: HCPCS | Performed by: INTERNAL MEDICINE

## 2020-10-15 PROCEDURE — 4040F PNEUMOC VAC/ADMIN/RCVD: CPT | Performed by: INTERNAL MEDICINE

## 2020-10-15 PROCEDURE — G8427 DOCREV CUR MEDS BY ELIG CLIN: HCPCS | Performed by: INTERNAL MEDICINE

## 2020-10-15 NOTE — LETTER
[address] [address]  Phone: [phone]  Fax: [phone]    Danielle Rubalcava MD        October 16, 2020     FAIZAN Manuel CNP  [address] [place] [address]    Patient: Madan Riggs  MR Number: [id number]  YOB: 1934  Date of Visit: 10/15/2020    Dear Dr. Sheri Martinez:      [address]     H+P // CONSULT // OUTPATIENT VISIT // FOLLOWUP VISIT     Referring Doctor FAIZAN Manuel CNP   Encounter Type Followup     CHIEF COMPLAINT     Visit Type Chronic   Symptoms None   Problems CAD, AI, HTN, Obesity     HISTORY OF PRESENT ILLNESS     ? GEN - Doing well. No new concerns. BP running high at home  ? CAD - Denies cp, sob, dizziness, syncope, palpitations. ? AI  - historically mild. No sob. ? HTN - Ambulatory BP good range. No HA or dizziness. ? OBESITY - remains overweight with noncompliance with diet and exercise. ? MED - Compliant with CV meds listed below without notable side effects. HISTORY/ALLERGIES/ROS     MedHx:  has a past medical history of Bilateral cataracts, Bladder stones, Blepharitis, Cancer ([address] Hypertension, Kidney stones, Obesity, Pneumonia, and SOB (shortness of breath). SurgHx:  has a past surgical history that includes Skin cancer excision; Cholecystectomy; Prostatectomy; Colonoscopy; Cystocopy (8/13/12); other surgical history (8/25/12); and eye surgery. SocHx:  reports that he quit smoking about 36 years ago. He has a 38.00 pack-year smoking history. He has never used smokeless tobacco. He reports that he does not drink alcohol or use drugs. FamHx: family history includes Bleeding Prob (age of onset: 47) in his father; Dementia in his mother; High Blood Pressure in his mother; High Cholesterol in his mother; Other (age of onset: [de-identified]) in his mother.    Allerg: Oxycodone-acetaminophen and Adhesive tape ROS: [x]Full ROS obtained and negative except as mentioned in HPI     MEDICATIONS      Current Outpatient Medications   Medication Sig Dispense Refill    potassium chloride (KLOR-CON M) 10 MEQ extended release tablet Take 1 tablet by mouth daily 90 tablet 3    primidone (MYSOLINE) 50 MG tablet TAKE 1 TABLET BY MOUTH DAILY 90 tablet 3    levothyroxine (SYNTHROID) 50 MCG tablet TAKE 1 TABLET BY MOUTH DAILY 90 tablet 3    potassium chloride (MICRO-K) 10 MEQ extended release capsule TAKE 1 CAPSULE BY MOUTH DAILY 90 capsule 3    donepezil (ARICEPT) 5 MG tablet TAKE 1 TABLET BY MOUTH EVERY NIGHT 90 tablet 3    torsemide (DEMADEX) 20 MG tablet Take 1 tablet by mouth daily 30 tablet 5    amLODIPine (NORVASC) 10 MG tablet Take 1 tablet by mouth daily 90 tablet 3    propranolol (INDERAL) 20 MG tablet TAKE 1 TABLET BY MOUTH DAILY 90 tablet 3    Calcium Carb-Cholecalciferol (CALCIUM/VITAMIN D PO) Take by mouth 1200 mg qd      aspirin 81 MG tablet Take 81 mg by mouth daily.  multivitamin (ANTIOXIDANT;PROSIGHT) TABS per tablet Take 1 tablet by mouth daily. No current facility-administered medications for this visit.       Reviewed with patient and will remain unchanged except as mentioned in A/P  PHYSICAL EXAM     Vitals:    10/15/20 1036   BP: 132/66   Pulse: 60   Resp: 20   SpO2: 97%      Gen Alert, coop, no distress Heart  Rrr, no mrg   Head NC, AT, no abnorm Abd  Soft, NT, +BS, no mass, no OM   Eyes PER, conj/corn clear Ext  Ext nl, AT, no C/C/E   Nose Nares nl, no drain, NT Pulse 2+ and symmetric   Throat Lips, mucosa, tongue nl Skin Col/text/turg nl, no vis rash/les   Neck S/S, TM, NT, no bruit/JVD Psych Nl mood and affect   Lung CTA-B, unlabored, no DTP Lymph   No cervical or axillary LA   Ch wall NT, no deform Neuro  Nl gross M/S exam     ASSESSMENT AND PLAN     *CAD   Date EF Results   Sx   No concerning   LHC   Patient declined   MPI 3/12  Mixed inferior I/S   TTE 8/16 55% Mild AI, mild to mod TR Plan   Continue aggressive medical treatment at doses above  Refuses heart cath for abnormal stress   *AI   Date EF Detail   Sx   No concerning   TTE 8/16 55% Mild AI   Plan   Continued observation   *HTN  Status Controlled  Plan Counseled on diet/salt/exercise/weight, continue meds at doses above  *OBESITY  Status Uncontrolled with a BMI of Body mass index is 32.06 kg/m². Plan Counseled on diet, exercise, weight loss options in detail  *COMPLIANCE  Status Compliant  Plan Discussed importance of compliance with meds/diet/salt/exercise; avoid tob/alc/drugs; patient verbalized understanding  *FOLLOWUP  6 months    [address] Blaze Tsang, am scribing for and in the presence of Evi Reddy MD.   SignedBlaze 10/13/20 9:18 AM   Provider Tyler Spears is working as a scribe for and in the presence of me (Evi Reddy MD). Working as a scribe, Blaze Fuchs may have prepopulated components of this note with my historical  intellectual property under my direct supervision. Any additions to this intellectual property were performed in my presence and at my direction. Furthermore, the content and accuracy of this note have been reviewed by me Evi Reddy MD). 10/15/2020 12:07 PM          If you have questions, please do not hesitate to call me. I look forward to following Alize Howard along with you.     Sincerely,        Acosta Shelley MD

## 2020-10-23 ENCOUNTER — OFFICE VISIT (OUTPATIENT)
Dept: INTERNAL MEDICINE CLINIC | Age: 85
End: 2020-10-23
Payer: MEDICARE

## 2020-10-23 VITALS
DIASTOLIC BLOOD PRESSURE: 68 MMHG | TEMPERATURE: 97 F | SYSTOLIC BLOOD PRESSURE: 122 MMHG | BODY MASS INDEX: 31.8 KG/M2 | WEIGHT: 241 LBS | HEART RATE: 64 BPM

## 2020-10-23 DIAGNOSIS — I25.10 CORONARY ARTERY DISEASE INVOLVING NATIVE CORONARY ARTERY OF NATIVE HEART WITHOUT ANGINA PECTORIS: ICD-10-CM

## 2020-10-23 DIAGNOSIS — I70.219 ATHEROSCLEROSIS OF NATIVE ARTERY OF LOWER EXTREMITY WITH INTERMITTENT CLAUDICATION, UNSPECIFIED LATERALITY (HCC): ICD-10-CM

## 2020-10-23 DIAGNOSIS — I48.0 PAF (PAROXYSMAL ATRIAL FIBRILLATION) (HCC): ICD-10-CM

## 2020-10-23 DIAGNOSIS — I10 ESSENTIAL HYPERTENSION: ICD-10-CM

## 2020-10-23 DIAGNOSIS — Z12.5 ENCOUNTER FOR SCREENING FOR MALIGNANT NEOPLASM OF PROSTATE: ICD-10-CM

## 2020-10-23 DIAGNOSIS — E03.9 ACQUIRED HYPOTHYROIDISM: ICD-10-CM

## 2020-10-23 DIAGNOSIS — Z85.46 HISTORY OF PROSTATE CANCER: ICD-10-CM

## 2020-10-23 DIAGNOSIS — E55.9 VITAMIN D DEFICIENCY: ICD-10-CM

## 2020-10-23 LAB
A/G RATIO: 2.9 (ref 1.1–2.2)
ALBUMIN SERPL-MCNC: 4.7 G/DL (ref 3.4–5)
ALP BLD-CCNC: 53 U/L (ref 40–129)
ALT SERPL-CCNC: 16 U/L (ref 10–40)
ANION GAP SERPL CALCULATED.3IONS-SCNC: 11 MMOL/L (ref 3–16)
AST SERPL-CCNC: 18 U/L (ref 15–37)
BILIRUB SERPL-MCNC: 0.6 MG/DL (ref 0–1)
BUN BLDV-MCNC: 23 MG/DL (ref 7–20)
CALCIUM SERPL-MCNC: 9.7 MG/DL (ref 8.3–10.6)
CHLORIDE BLD-SCNC: 103 MMOL/L (ref 99–110)
CHOLESTEROL, TOTAL: 130 MG/DL (ref 0–199)
CO2: 29 MMOL/L (ref 21–32)
CREAT SERPL-MCNC: 1.3 MG/DL (ref 0.8–1.3)
GFR AFRICAN AMERICAN: >60
GFR NON-AFRICAN AMERICAN: 52
GLOBULIN: 1.6 G/DL
GLUCOSE BLD-MCNC: 102 MG/DL (ref 70–99)
HDLC SERPL-MCNC: 41 MG/DL (ref 40–60)
LDL CHOLESTEROL CALCULATED: 67 MG/DL
POTASSIUM SERPL-SCNC: 4.5 MMOL/L (ref 3.5–5.1)
PROSTATE SPECIFIC ANTIGEN: 0.7 NG/ML (ref 0–4)
SODIUM BLD-SCNC: 143 MMOL/L (ref 136–145)
TOTAL PROTEIN: 6.3 G/DL (ref 6.4–8.2)
TRIGL SERPL-MCNC: 110 MG/DL (ref 0–150)
TSH SERPL DL<=0.05 MIU/L-ACNC: 7.72 UIU/ML (ref 0.27–4.2)
VITAMIN D 25-HYDROXY: 41.7 NG/ML
VLDLC SERPL CALC-MCNC: 22 MG/DL

## 2020-10-23 PROCEDURE — 1123F ACP DISCUSS/DSCN MKR DOCD: CPT | Performed by: NURSE PRACTITIONER

## 2020-10-23 PROCEDURE — 90732 PPSV23 VACC 2 YRS+ SUBQ/IM: CPT | Performed by: NURSE PRACTITIONER

## 2020-10-23 PROCEDURE — G8484 FLU IMMUNIZE NO ADMIN: HCPCS | Performed by: NURSE PRACTITIONER

## 2020-10-23 PROCEDURE — 90694 VACC AIIV4 NO PRSRV 0.5ML IM: CPT | Performed by: NURSE PRACTITIONER

## 2020-10-23 PROCEDURE — G0439 PPPS, SUBSEQ VISIT: HCPCS | Performed by: NURSE PRACTITIONER

## 2020-10-23 PROCEDURE — G0008 ADMIN INFLUENZA VIRUS VAC: HCPCS | Performed by: NURSE PRACTITIONER

## 2020-10-23 PROCEDURE — 4040F PNEUMOC VAC/ADMIN/RCVD: CPT | Performed by: NURSE PRACTITIONER

## 2020-10-23 PROCEDURE — G0009 ADMIN PNEUMOCOCCAL VACCINE: HCPCS | Performed by: NURSE PRACTITIONER

## 2020-10-23 ASSESSMENT — PATIENT HEALTH QUESTIONNAIRE - PHQ9
SUM OF ALL RESPONSES TO PHQ9 QUESTIONS 1 & 2: 0
SUM OF ALL RESPONSES TO PHQ QUESTIONS 1-9: 0
SUM OF ALL RESPONSES TO PHQ QUESTIONS 1-9: 0
2. FEELING DOWN, DEPRESSED OR HOPELESS: 0
1. LITTLE INTEREST OR PLEASURE IN DOING THINGS: 0
SUM OF ALL RESPONSES TO PHQ QUESTIONS 1-9: 0

## 2020-10-23 ASSESSMENT — LIFESTYLE VARIABLES: HOW OFTEN DO YOU HAVE A DRINK CONTAINING ALCOHOL: 0

## 2020-10-23 NOTE — PATIENT INSTRUCTIONS
Personalized Preventive Plan for Yenny Rodriguez - 10/23/2020  Medicare offers a range of preventive health benefits. Some of the tests and screenings are paid in full while other may be subject to a deductible, co-insurance, and/or copay. Some of these benefits include a comprehensive review of your medical history including lifestyle, illnesses that may run in your family, and various assessments and screenings as appropriate. After reviewing your medical record and screening and assessments performed today your provider may have ordered immunizations, labs, imaging, and/or referrals for you. A list of these orders (if applicable) as well as your Preventive Care list are included within your After Visit Summary for your review. Other Preventive Recommendations:    · A preventive eye exam performed by an eye specialist is recommended every 1-2 years to screen for glaucoma; cataracts, macular degeneration, and other eye disorders. · A preventive dental visit is recommended every 6 months. · Try to get at least 150 minutes of exercise per week or 10,000 steps per day on a pedometer . · Order or download the FREE \"Exercise & Physical Activity: Your Everyday Guide\" from [employer]. Call 4-658.952.6809 or search [employer] online. · You need 7504-5773 mg of calcium and 6754-2637 IU of vitamin D per day. It is possible to meet your calcium requirement with diet alone, but a vitamin D supplement is usually necessary to meet this goal.  · When exposed to the sun, use a sunscreen that protects against both UVA and UVB radiation with an SPF of 30 or greater. Reapply every 2 to 3 hours or after sweating, drying off with a towel, or swimming. · Always wear a seat belt when traveling in a car. Always wear a helmet when riding a bicycle or motorcycle.

## 2020-10-23 NOTE — PROGRESS NOTES
Medicare Annual Wellness Visit  Name: Nigel Dean Date: 10/23/2020   MRN: [id number] Sex: Male   Age: 80 y.o. Ethnicity: Non-/Non    : 1934 Race: Richa Palmer is here for Medicare AWV (PSA test )    Screenings for behavioral, psychosocial and functional/safety risks, and cognitive dysfunction are all negative except as indicated below. These results, as well as other patient data from the [address] form, are documented in Flowsheets linked to this Encounter. Allergies   Allergen Reactions    Oxycodone-Acetaminophen Other (See Comments)     halucinations  Other reaction(s): hallucinations    Adhesive Tape      Skin becomes raw       Prior to Visit Medications    Medication Sig Taking? Authorizing Provider   potassium chloride (KLOR-CON M) 10 MEQ extended release tablet Take 1 tablet by mouth daily Yes FAIZAN Abdalla CNP   primidone (MYSOLINE) 50 MG tablet TAKE 1 TABLET BY MOUTH DAILY Yes FAIZAN Abdalla CNP   levothyroxine (SYNTHROID) 50 MCG tablet TAKE 1 TABLET BY MOUTH DAILY Yes FAIZAN Abdalla CNP   donepezil (ARICEPT) 5 MG tablet TAKE 1 TABLET BY MOUTH EVERY NIGHT Yes FAIZAN Abdalla CNP   torsemide (DEMADEX) 20 MG tablet Take 1 tablet by mouth daily Yes Fernando Iniguez MD   amLODIPine (NORVASC) 10 MG tablet Take 1 tablet by mouth daily Yes Fernando Iniguez MD   propranolol (INDERAL) 20 MG tablet TAKE 1 TABLET BY MOUTH DAILY Yes Fernando Iniguez MD   Calcium Carb-Cholecalciferol (CALCIUM/VITAMIN D PO) Take by mouth 1200 mg qd Yes Historical Provider, MD   aspirin 81 MG tablet Take 81 mg by mouth daily. Yes Historical Provider, MD   multivitamin (ANTIOXIDANT;PROSIGHT) TABS per tablet Take 1 tablet by mouth daily.  Yes Historical Provider, MD       Past Medical History:   Diagnosis Date    Bilateral cataracts     Bladder stones     Blepharitis     Cancer ([facility] [address]     prostate    Hypertension  Kidney stones     with bladder stones    Obesity     Pneumonia     SOB (shortness of breath)        Past Surgical History:   Procedure Laterality Date    CHOLECYSTECTOMY      '98     COLONOSCOPY      polypectomy    CYSTOSCOPY  8/13/12    left retrograde    EYE SURGERY      OTHER SURGICAL HISTORY  8/25/12    left ureteral lithotomy    PROSTATECTOMY      with abd panniculectomy '98    SKIN CANCER EXCISION      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       Family History   Problem Relation Age of Onset    Dementia Mother     Other Mother [de-identified]        pneumonia    High Blood Pressure Mother     High Cholesterol Mother     Bleeding Prob Father 47        blood poisoning    Heart Disease Neg Hx        CareTeam (Including outside providers/suppliers regularly involved in providing care):   Patient Care Team:  FAIZAN Portillo CNP as PCP - General (Nurse Practitioner)  FAIZAN Portillo CNP as PCP - [facility]    Wt Readings from Last 3 Encounters:   10/23/20 241 lb (109.3 kg)   10/15/20 243 lb (110.2 kg)   07/23/20 253 lb (114.8 kg)     Vitals:    10/23/20 1025   BP: 122/68   Pulse: 64   Temp: 97 °F (36.1 °C)   TempSrc: Temporal   Weight: 241 lb (109.3 kg)     Body mass index is 31.8 kg/m². Based upon direct observation of the patient, evaluation of cognition reveals remote memory intact, recent memory impaired. Gen: NAD  Eyes: no icterus, no conjunctival erythema  CV: RRR, no mrgs  Resp: CTAB  Abd: soft, NTTP  Neuro: alert, oriented, answers questions appropriately  MSK: no peripheral edema  Skin: warm, dry  Psych: Normal mood, affect      Patient's complete Health Risk Assessment and screening values have been reviewed and are found in Flowsheets. The following problems were reviewed today and where indicated follow up appointments were made and/or referrals ordered.     Positive Risk Factor Screenings with Interventions:     Health Habits/Nutrition:  Health Habits/Nutrition  Do you exercise for at least 20 minutes 2-3 times per week?: (!) No  Have you lost any weight without trying in the past 3 months?: No  Do you eat fewer than 2 meals per day?: No  Have you seen a dentist within the past year?: (!) No     Health Habits/Nutrition Interventions:  · Inadequate physical activity:  patient is not ready to increase his/her physical activity level at this time  · Dental exam overdue:  patient encouraged to make appointment with his/her dentist    Hearing/Vision:  No exam data present  Hearing/Vision  Do you or your family notice any trouble with your hearing?: (!) Yes  Do you have difficulty driving, watching TV, or doing any of your daily activities because of your eyesight?: No  Have you had an eye exam within the past year?: Yes  Hearing/Vision Interventions:  · Hearing concerns:  ENT referral provided    Personalized Preventive Plan   Current Health Maintenance Status  Immunization History   Administered Date(s) Administered    Influenza 09/22/2011    Influenza Virus Vaccine 09/22/2011, 09/18/2014, 10/15/2015    Influenza Whole 10/23/2009    Influenza, High Dose (Fluzone 65 yrs and older) 09/18/2014, 10/15/2015, 10/04/2016, 09/21/2017, 10/25/2018    Influenza, Triv, inactivated, subunit, adjuvanted, IM (Fluad 65 yrs and older) 09/11/2019    PPD Test 08/01/2012    Pneumococcal Conjugate 13-valent ([id number]) 10/04/2016    Pneumococcal Conjugate 7-valent (Prevnar7) 10/23/2007    Pneumococcal Polysaccharide ([id number]) 11/08/2017    Tdap (Boostrix, Adacel) 06/21/2012    Zoster Recombinant (Shingrix) 04/04/2019, 07/19/2019        Health Maintenance   Topic Date Due    Annual Wellness Visit (AWV)  05/29/2019    Flu vaccine (1) 09/01/2020    PSA counseling  09/11/2020    Potassium monitoring  05/20/2021    Creatinine monitoring  05/20/2021    DTaP/Tdap/Td vaccine (2 - Td) 06/21/2022    Shingles Vaccine  Completed    Pneumococcal 65+ years Vaccine Completed    Hepatitis A vaccine  Aged Out    Hepatitis B vaccine  Aged Out    Hib vaccine  Aged Out    Meningococcal (ACWY) vaccine  Aged Out     Recommendations for [employer] Due: see orders and patient instructions/AVS.  . Recommended screening schedule for the next 5-10 years is provided to the patient in written form: see Patient Bebe Farias was seen today for medicare awv. Diagnoses and all orders for this visit:    Routine general medical examination at a health care facility    Essential hypertension  -     COMPREHENSIVE METABOLIC PANEL; Future  -     LIPID PANEL; Future    PAF (paroxysmal atrial fibrillation) (HCC)  -     COMPREHENSIVE METABOLIC PANEL; Future    Atherosclerosis of native artery of lower extremity with intermittent claudication, unspecified laterality ([facility] [address]  -     COMPREHENSIVE METABOLIC PANEL; Future  -     LIPID PANEL; Future    Coronary artery disease involving native coronary artery of native heart without angina pectoris  -     COMPREHENSIVE METABOLIC PANEL; Future  -     LIPID PANEL; Future    Acquired hypothyroidism  -     TSH without Reflex; Future    Vitamin D deficiency  -     Vitamin D 25 Hydroxy; Future    Encounter for screening for malignant neoplasm of prostate   -     Psa screening;  Future    Need for influenza vaccination  -     INFLUENZA, QUADV, ADJUVANTED, 72 YRS =, IM, PF, PREFILL SYR, 0.5ML (FLUAD)    Need for pneumococcal vaccination  -     PNEUMOVAX 23 subcutaneous/IM (Pneumococcal polysaccharide vaccine 23-valent >= 3yo)        FAIZAN Nguyen - CNP

## 2020-10-26 RX ORDER — LEVOTHYROXINE SODIUM 0.07 MG/1
75 TABLET ORAL DAILY
Qty: 90 TABLET | Refills: 3 | Status: SHIPPED | OUTPATIENT
Start: 2020-10-26 | End: 2021-06-30

## 2020-11-16 RX ORDER — TORSEMIDE 20 MG/1
20 TABLET ORAL DAILY
Qty: 30 TABLET | Refills: 5 | Status: SHIPPED | OUTPATIENT
Start: 2020-11-16 | End: 2021-05-11

## 2021-01-21 ENCOUNTER — TELEPHONE (OUTPATIENT)
Dept: ENT CLINIC | Age: 86
End: 2021-01-21

## 2021-01-21 ENCOUNTER — IMMUNIZATION (OUTPATIENT)
Dept: PRIMARY CARE CLINIC | Age: 86
End: 2021-01-21
Payer: MEDICARE

## 2021-01-21 PROCEDURE — 0001A PR IMM ADMN SARSCOV2 30MCG/0.3ML DIL RECON 1ST DOSE: CPT | Performed by: FAMILY MEDICINE

## 2021-01-21 PROCEDURE — 91300 COVID-19, PFIZER VACCINE 30MCG/0.3ML DOSE: CPT | Performed by: FAMILY MEDICINE

## 2021-01-21 NOTE — TELEPHONE ENCOUNTER
appt needs to be rescheduled before 6/30  - also establish with a new doctor for continued care, medications etc

## 2021-02-01 RX ORDER — PROPRANOLOL HYDROCHLORIDE 20 MG/1
20 TABLET ORAL DAILY
Qty: 90 TABLET | Refills: 3 | Status: SHIPPED | OUTPATIENT
Start: 2021-02-01 | End: 2022-01-01

## 2021-02-01 NOTE — TELEPHONE ENCOUNTER
Received refill request for propanolol from [facility].     Last ov:10/15/2020 DCE    Last EKG:3/8/2018    Last Refill:#90 with 3 refills 2/3/2020    Next appointment:4/29/2021 DCE

## 2021-02-11 ENCOUNTER — IMMUNIZATION (OUTPATIENT)
Dept: PRIMARY CARE CLINIC | Age: 86
End: 2021-02-11
Payer: MEDICARE

## 2021-02-11 PROCEDURE — 0002A COVID-19, PFIZER VACCINE 30MCG/0.3ML DOSE: CPT | Performed by: FAMILY MEDICINE

## 2021-02-11 PROCEDURE — 91300 COVID-19, PFIZER VACCINE 30MCG/0.3ML DOSE: CPT | Performed by: FAMILY MEDICINE

## 2021-02-19 ENCOUNTER — OFFICE VISIT (OUTPATIENT)
Dept: INTERNAL MEDICINE CLINIC | Age: 86
End: 2021-02-19
Payer: MEDICARE

## 2021-02-19 ENCOUNTER — HOSPITAL ENCOUNTER (OUTPATIENT)
Dept: CT IMAGING | Age: 86
Discharge: HOME | End: 2021-02-19
Payer: MEDICARE

## 2021-02-19 VITALS
BODY MASS INDEX: 31.53 KG/M2 | OXYGEN SATURATION: 99 % | HEART RATE: 60 BPM | DIASTOLIC BLOOD PRESSURE: 60 MMHG | WEIGHT: 239 LBS | TEMPERATURE: 97.4 F | SYSTOLIC BLOOD PRESSURE: 122 MMHG

## 2021-02-19 DIAGNOSIS — R59.0 SUPRACLAVICULAR ADENOPATHY: ICD-10-CM

## 2021-02-19 DIAGNOSIS — D72.820 LYMPHOCYTOSIS: ICD-10-CM

## 2021-02-19 DIAGNOSIS — R59.0 SUPRACLAVICULAR ADENOPATHY: Primary | ICD-10-CM

## 2021-02-19 DIAGNOSIS — J43.1 PANLOBULAR EMPHYSEMA (HCC): ICD-10-CM

## 2021-02-19 DIAGNOSIS — R91.8 PULMONARY NODULES: ICD-10-CM

## 2021-02-19 LAB
A/G RATIO: 1.6 (ref 1.1–2.2)
A/G RATIO: 1.9 (ref 1.1–2.2)
ALBUMIN SERPL-MCNC: 4.1 G/DL (ref 3.4–5)
ALBUMIN SERPL-MCNC: 4.2 G/DL (ref 3.4–5)
ALP BLD-CCNC: 109 U/L (ref 40–129)
ALP BLD-CCNC: 112 U/L (ref 40–129)
ALT SERPL-CCNC: 10 U/L (ref 10–40)
ALT SERPL-CCNC: 11 U/L (ref 10–40)
ANION GAP SERPL CALCULATED.3IONS-SCNC: 12 MMOL/L (ref 3–16)
ANION GAP SERPL CALCULATED.3IONS-SCNC: 8 MMOL/L (ref 3–16)
AST SERPL-CCNC: 16 U/L (ref 15–37)
AST SERPL-CCNC: 18 U/L (ref 15–37)
ATYPICAL LYMPHOCYTE RELATIVE PERCENT: 6 % (ref 0–6)
BASOPHILS ABSOLUTE: 0 K/UL (ref 0–0.2)
BASOPHILS RELATIVE PERCENT: 0 %
BILIRUB SERPL-MCNC: 0.4 MG/DL (ref 0–1)
BILIRUB SERPL-MCNC: 0.5 MG/DL (ref 0–1)
BUN BLDV-MCNC: 23 MG/DL (ref 7–20)
BUN BLDV-MCNC: 24 MG/DL (ref 7–20)
CALCIUM SERPL-MCNC: 9.2 MG/DL (ref 8.3–10.6)
CALCIUM SERPL-MCNC: 9.3 MG/DL (ref 8.3–10.6)
CHLORIDE BLD-SCNC: 102 MMOL/L (ref 99–110)
CHLORIDE BLD-SCNC: 105 MMOL/L (ref 99–110)
CO2: 25 MMOL/L (ref 21–32)
CO2: 28 MMOL/L (ref 21–32)
CREAT SERPL-MCNC: 1.4 MG/DL (ref 0.8–1.3)
CREAT SERPL-MCNC: 1.4 MG/DL (ref 0.8–1.3)
EOSINOPHILS ABSOLUTE: 0.2 K/UL (ref 0–0.6)
EOSINOPHILS RELATIVE PERCENT: 1 %
GFR AFRICAN AMERICAN: 58
GFR AFRICAN AMERICAN: 58
GFR NON-AFRICAN AMERICAN: 48
GFR NON-AFRICAN AMERICAN: 48
GLOBULIN: 2.2 G/DL
GLOBULIN: 2.6 G/DL
GLUCOSE BLD-MCNC: 103 MG/DL (ref 70–99)
GLUCOSE BLD-MCNC: 63 MG/DL (ref 70–99)
HCT VFR BLD CALC: 45.7 % (ref 40.5–52.5)
HEMATOLOGY PATH CONSULT: YES
HEMOGLOBIN: 14.5 G/DL (ref 13.5–17.5)
LYMPHOCYTES ABSOLUTE: 12.8 K/UL (ref 1–5.1)
LYMPHOCYTES RELATIVE PERCENT: 53 %
MACROCYTES: ABNORMAL
MCH RBC QN AUTO: 29.4 PG (ref 26–34)
MCHC RBC AUTO-ENTMCNC: 31.7 G/DL (ref 31–36)
MCV RBC AUTO: 92.8 FL (ref 80–100)
MONOCYTES ABSOLUTE: 1.3 K/UL (ref 0–1.3)
MONOCYTES RELATIVE PERCENT: 6 %
NEUTROPHILS ABSOLUTE: 7.4 K/UL (ref 1.7–7.7)
NEUTROPHILS RELATIVE PERCENT: 34 %
PDW BLD-RTO: 14.5 % (ref 12.4–15.4)
PLATELET # BLD: 139 K/UL (ref 135–450)
PLATELET SLIDE REVIEW: ADEQUATE
PMV BLD AUTO: 8.2 FL (ref 5–10.5)
POTASSIUM SERPL-SCNC: 4.1 MMOL/L (ref 3.5–5.1)
POTASSIUM SERPL-SCNC: 4.6 MMOL/L (ref 3.5–5.1)
RBC # BLD: 4.93 M/UL (ref 4.2–5.9)
SEDIMENTATION RATE, ERYTHROCYTE: 17 MM/HR (ref 0–20)
SEDIMENTATION RATE, ERYTHROCYTE: 25 MM/HR (ref 0–20)
SLIDE REVIEW: ABNORMAL
SMUDGE CELLS: PRESENT
SODIUM BLD-SCNC: 139 MMOL/L (ref 136–145)
SODIUM BLD-SCNC: 141 MMOL/L (ref 136–145)
TEAR DROP CELLS: ABNORMAL
TOTAL PROTEIN: 6.3 G/DL (ref 6.4–8.2)
TOTAL PROTEIN: 6.8 G/DL (ref 6.4–8.2)
WBC # BLD: 21.7 K/UL (ref 4–11)

## 2021-02-19 PROCEDURE — 70491 CT SOFT TISSUE NECK W/DYE: CPT

## 2021-02-19 PROCEDURE — G8427 DOCREV CUR MEDS BY ELIG CLIN: HCPCS | Performed by: NURSE PRACTITIONER

## 2021-02-19 PROCEDURE — G8926 SPIRO NO PERF OR DOC: HCPCS | Performed by: NURSE PRACTITIONER

## 2021-02-19 PROCEDURE — 1036F TOBACCO NON-USER: CPT | Performed by: NURSE PRACTITIONER

## 2021-02-19 PROCEDURE — 6360000004 HC RX CONTRAST MEDICATION: Performed by: NURSE PRACTITIONER

## 2021-02-19 PROCEDURE — 3023F SPIROM DOC REV: CPT | Performed by: NURSE PRACTITIONER

## 2021-02-19 PROCEDURE — 80053 COMPREHEN METABOLIC PANEL: CPT

## 2021-02-19 PROCEDURE — G8417 CALC BMI ABV UP PARAM F/U: HCPCS | Performed by: NURSE PRACTITIONER

## 2021-02-19 PROCEDURE — 71260 CT THORAX DX C+: CPT

## 2021-02-19 PROCEDURE — 36415 COLL VENOUS BLD VENIPUNCTURE: CPT

## 2021-02-19 PROCEDURE — 85025 COMPLETE CBC W/AUTO DIFF WBC: CPT

## 2021-02-19 PROCEDURE — 99214 OFFICE O/P EST MOD 30 MIN: CPT | Performed by: NURSE PRACTITIONER

## 2021-02-19 PROCEDURE — 1123F ACP DISCUSS/DSCN MKR DOCD: CPT | Performed by: NURSE PRACTITIONER

## 2021-02-19 PROCEDURE — 85652 RBC SED RATE AUTOMATED: CPT

## 2021-02-19 PROCEDURE — G8484 FLU IMMUNIZE NO ADMIN: HCPCS | Performed by: NURSE PRACTITIONER

## 2021-02-19 PROCEDURE — 4040F PNEUMOC VAC/ADMIN/RCVD: CPT | Performed by: NURSE PRACTITIONER

## 2021-02-19 RX ADMIN — IOPAMIDOL 75 ML: 755 INJECTION, SOLUTION INTRAVENOUS at 13:38

## 2021-02-19 ASSESSMENT — PATIENT HEALTH QUESTIONNAIRE - PHQ9
SUM OF ALL RESPONSES TO PHQ QUESTIONS 1-9: 0
2. FEELING DOWN, DEPRESSED OR HOPELESS: 0

## 2021-02-19 NOTE — PROGRESS NOTES
SUBJECTIVE:    Patient ID: Gorge Patton is a 80 y.o. male. CC: Neck swelling    HPI: The patient presents to the office for an acute visit. The patient reports that he has felt poorly for the past few days-weeks with nonspecific aches and pains. 5 days ago, he noticed swelling at the base of his neck on the right. This was not associated with a sore throat or difficulty swallowing. He reports the lump is tender. He reports some vague weight loss over the past year. He denies any night sweats. He denies any fever. Of note, he received the second Covid vaccine last Thursday, 5 days prior to the onset of swelling. He gets some relief with naproxen. Current Outpatient Medications   Medication Sig Dispense Refill    propranolol (INDERAL) 20 MG tablet TAKE 1 TABLET BY MOUTH DAILY 90 tablet 3    torsemide (DEMADEX) 20 MG tablet Take 1 tablet by mouth daily 30 tablet 5    levothyroxine (SYNTHROID) 75 MCG tablet Take 1 tablet by mouth Daily 90 tablet 3    potassium chloride (KLOR-CON M) 10 MEQ extended release tablet Take 1 tablet by mouth daily 90 tablet 3    primidone (MYSOLINE) 50 MG tablet TAKE 1 TABLET BY MOUTH DAILY 90 tablet 3    donepezil (ARICEPT) 5 MG tablet TAKE 1 TABLET BY MOUTH EVERY NIGHT 90 tablet 3    amLODIPine (NORVASC) 10 MG tablet Take 1 tablet by mouth daily 90 tablet 3    Calcium Carb-Cholecalciferol (CALCIUM/VITAMIN D PO) Take by mouth 1200 mg qd      aspirin 81 MG tablet Take 81 mg by mouth daily.  multivitamin (ANTIOXIDANT;PROSIGHT) TABS per tablet Take 1 tablet by mouth daily. No current facility-administered medications for this visit. Review of Systems   Constitutional: Positive for chills and unexpected weight change. Negative for fever. HENT: Negative for trouble swallowing. Respiratory: Negative. Cardiovascular: Negative. Gastrointestinal: Negative. Genitourinary: Negative. Musculoskeletal: Negative. Skin: Negative. Hematological: Positive for adenopathy. OBJECTIVE:  Physical Exam  Constitutional:       Appearance: Normal appearance. HENT:      Head: Atraumatic. Neck:      Musculoskeletal: Normal range of motion. Comments: Obvious right supraclavicular lymphadenopathy which is tender to palpation. Skin:     General: Skin is warm and dry. Neurological:      General: No focal deficit present. Mental Status: He is alert and oriented to person, place, and time. Psychiatric:         Mood and Affect: Mood normal.         Behavior: Behavior normal.        /60   Pulse 60   Temp 97.4 °F (36.3 °C)   Wt 239 lb (108.4 kg)   SpO2 99%   BMI 31.53 kg/m²      PHQ Scores 2/19/2021 10/23/2020 5/18/2020 9/11/2019 2/20/2019 7/26/2018   PHQ2 Score 0 0 0 0 0 0   PHQ9 Score 0 0 0 0 0 0     Interpretation of Total Score Depression Severity: 1-4 = Minimal depression, 5-9 = Mild depression, 10-14 = Moderate depression, 15-19 = Moderately severe depression, 20-27 =Severe depression         Supraclavicular adenopathy  -     COMPREHENSIVE METABOLIC PANEL; Future  -     CBC WITH AUTO DIFFERENTIAL; Future  -     SEDIMENTATION RATE; Future  -     CT SOFT TISSUE NECK W CONTRAST; Future  -     CT CHEST W CONTRAST; Future    -Presents with 5-day history of obvious right supraclavicular lymphadenopathy. A few days prior to the onset of the lymphadenopathy, he received his second Covid vaccine on the right arm. We discussed the possibility of reactive adenopathy though he has no axillary swelling so this seems unlikely. He has had some chills and weight loss, so concerning for possible metastatic process. We will proceed with labs and CT of the chest and neck. Addendum: Patient was able to get his CT scan performed at [facility] today and we already have received the results.     CT neck with contrast:  Severe supraclavicular lymphadenopathy involving the right level 3 and level 4 chains.  The largest lymph node measures 2.7 cm and is chronic in   appearance.  Metastatic lymphadenopathy is suspected.  Lymphoma is a   possibility.  Infectious/inflammatory process is considered less likely.       Additional multiple small lymph nodes within the left jugular chain, both   posterior triangles and right level 2 sajan chain are nonspecific.       2 cm cystic lesion within the right suprahyoid neck anteriorly likely   representing a thyroglossal duct cyst.       CT chest with contrast:  1. Mild dependent atelectasis throughout both lungs, without acute airspace   consolidation. 2. Mild emphysema. 3. Multiple stable scattered various sized nodules throughout both lungs are   stable and unchanged dating back to at least 2012, and are consistent with   benign granulomata given their stability.  No new suspicious pulmonary nodule   or mass is evident. 4. Partially visualized right supraclavicular lymphadenopathy, fully detailed   on today's neck CT report. 5. Mild left subpectoral, bilateral hilar, and upper abdominal   lymphadenopathy, new from prior exams.  Differential considerations include   benign reactive lymphadenopathy, lymphoma, or metastatic disease. 6. Stable mild splenomegaly. CT neck confirms supraclavicular lymphadenopathy with concern for metastatic lymphadenopathy. Labs show WBC 21.7 with elevated absolute lymphocytes and smudge cells present concerning for CLL. Case discussed with [facility] Thea Trevizo with [facility]. Will refer and patient contacted with the results and is agreeable to referral to oncology. Assessment/Plan:  Dinora De La Fuente was seen today for neck swelling. Diagnoses and all orders for this visit:    Supraclavicular adenopathy  - As above  -     COMPREHENSIVE METABOLIC PANEL; Future  -     CBC WITH AUTO DIFFERENTIAL; Future  -     SEDIMENTATION RATE; Future  -     CT SOFT TISSUE NECK W CONTRAST; Future  -     CT CHEST W CONTRAST;  Future Lymphocytosis  - Concerning for CLL with above findings and smudge cells    Panlobular emphysema (HCC)  -Mild emphysema noted on CT.   Consider pulmonary function test in the future if needed    Pulmonary nodules  -Appear stable from previous scans        Brittany Clay, APRN - CNP

## 2021-02-22 LAB
BASOPHILS ABSOLUTE: 0.2 K/UL (ref 0–0.2)
BASOPHILS RELATIVE PERCENT: 1 %
EOSINOPHILS ABSOLUTE: 0 K/UL (ref 0–0.6)
EOSINOPHILS RELATIVE PERCENT: 0 %
HCT VFR BLD CALC: 45.5 % (ref 40.5–52.5)
HEMATOLOGY PATH CONSULT: NORMAL
HEMATOLOGY PATH CONSULT: NORMAL
HEMATOLOGY PATH CONSULT: YES
HEMOGLOBIN: 14.6 G/DL (ref 13.5–17.5)
LYMPHOCYTES ABSOLUTE: 14.2 K/UL (ref 1–5.1)
LYMPHOCYTES RELATIVE PERCENT: 68 %
MCH RBC QN AUTO: 29.9 PG (ref 26–34)
MCHC RBC AUTO-ENTMCNC: 32.2 G/DL (ref 31–36)
MCV RBC AUTO: 92.8 FL (ref 80–100)
MONOCYTES ABSOLUTE: 0.4 K/UL (ref 0–1.3)
MONOCYTES RELATIVE PERCENT: 2 %
NEUTROPHILS ABSOLUTE: 6.1 K/UL (ref 1.7–7.7)
NEUTROPHILS RELATIVE PERCENT: 29 %
PDW BLD-RTO: 14.9 % (ref 12.4–15.4)
PLATELET # BLD: 138 K/UL (ref 135–450)
PMV BLD AUTO: 8.2 FL (ref 5–10.5)
RBC # BLD: 4.9 M/UL (ref 4.2–5.9)
RBC # BLD: NORMAL 10*6/UL
SMUDGE CELLS: PRESENT
WBC # BLD: 20.9 K/UL (ref 4–11)

## 2021-02-23 ASSESSMENT — ENCOUNTER SYMPTOMS
TROUBLE SWALLOWING: 0
RESPIRATORY NEGATIVE: 1
GASTROINTESTINAL NEGATIVE: 1

## 2021-03-21 DIAGNOSIS — E03.9 ACQUIRED HYPOTHYROIDISM: Primary | ICD-10-CM

## 2021-03-24 ENCOUNTER — HOSPITAL ENCOUNTER (OUTPATIENT)
Dept: CT IMAGING | Age: 86
Discharge: HOME | End: 2021-03-24
Payer: MEDICARE

## 2021-03-24 DIAGNOSIS — C91.10 CHRONIC LYMPHOCYTIC LEUKEMIA OF B-CELL TYPE NOT HAVING ACHIEVED REMISSION (HCC): ICD-10-CM

## 2021-03-24 PROCEDURE — 6360000004 HC RX CONTRAST MEDICATION: Performed by: INTERNAL MEDICINE

## 2021-03-24 PROCEDURE — 74177 CT ABD & PELVIS W/CONTRAST: CPT

## 2021-03-24 RX ADMIN — IOHEXOL 50 ML: 240 INJECTION, SOLUTION INTRATHECAL; INTRAVASCULAR; INTRAVENOUS; ORAL at 13:54

## 2021-03-24 RX ADMIN — IOPAMIDOL 75 ML: 755 INJECTION, SOLUTION INTRAVENOUS at 13:54

## 2021-04-09 DIAGNOSIS — E03.9 ACQUIRED HYPOTHYROIDISM: ICD-10-CM

## 2021-04-09 DIAGNOSIS — E03.9 ACQUIRED HYPOTHYROIDISM: Primary | ICD-10-CM

## 2021-04-10 LAB — TSH SERPL DL<=0.05 MIU/L-ACNC: 19.27 UIU/ML (ref 0.27–4.2)

## 2021-04-29 ENCOUNTER — OFFICE VISIT (OUTPATIENT)
Dept: CARDIOLOGY CLINIC | Age: 86
End: 2021-04-29
Payer: MEDICARE

## 2021-04-29 VITALS
DIASTOLIC BLOOD PRESSURE: 60 MMHG | SYSTOLIC BLOOD PRESSURE: 128 MMHG | OXYGEN SATURATION: 98 % | WEIGHT: 235 LBS | HEART RATE: 60 BPM | BODY MASS INDEX: 31.14 KG/M2 | HEIGHT: 73 IN

## 2021-04-29 DIAGNOSIS — E66.9 OBESITY (BMI 30-39.9): ICD-10-CM

## 2021-04-29 DIAGNOSIS — I35.1 NONRHEUMATIC AORTIC VALVE INSUFFICIENCY: ICD-10-CM

## 2021-04-29 DIAGNOSIS — I25.10 CORONARY ARTERY DISEASE DUE TO LIPID RICH PLAQUE: Primary | ICD-10-CM

## 2021-04-29 DIAGNOSIS — I10 ESSENTIAL HYPERTENSION: ICD-10-CM

## 2021-04-29 DIAGNOSIS — I25.83 CORONARY ARTERY DISEASE DUE TO LIPID RICH PLAQUE: Primary | ICD-10-CM

## 2021-04-29 PROCEDURE — G8417 CALC BMI ABV UP PARAM F/U: HCPCS | Performed by: INTERNAL MEDICINE

## 2021-04-29 PROCEDURE — G8427 DOCREV CUR MEDS BY ELIG CLIN: HCPCS | Performed by: INTERNAL MEDICINE

## 2021-04-29 PROCEDURE — 4040F PNEUMOC VAC/ADMIN/RCVD: CPT | Performed by: INTERNAL MEDICINE

## 2021-04-29 PROCEDURE — 1123F ACP DISCUSS/DSCN MKR DOCD: CPT | Performed by: INTERNAL MEDICINE

## 2021-04-29 PROCEDURE — 1036F TOBACCO NON-USER: CPT | Performed by: INTERNAL MEDICINE

## 2021-04-29 PROCEDURE — 99214 OFFICE O/P EST MOD 30 MIN: CPT | Performed by: INTERNAL MEDICINE

## 2021-04-29 NOTE — LETTER
[address] [facility] [address] [address]  Phone: [phone]  Fax: [phone]    Sofia Ovalles MD        April 29, 2021     FAIZAN Parra CNP  [address] [place] [address] Drive [address]    Patient: Gabriela Gonzales  MR Number: [id number]  YOB: 1934  Date of Visit: 4/29/2021    Dear Dr. Luis Landry:      [address]     H+P // CONSULT // OUTPATIENT VISIT // FOLLOWUP VISIT     Referring Doctor FAIZAN Parra CNP   Encounter Type Followup     CHIEF COMPLAINT     Visit Type Chronic   Symptoms None   Problems CAD, AI, HTN, Obesity     HISTORY OF PRESENT ILLNESS      GEN - Doing well. No new concerns. New dx of leukemia.  CAD - Denies cp, sob, dizziness, syncope, palpitations.  AI  - historically mild. No sob.  HTN - Ambulatory BP good range. No HA or dizziness.  OBESITY - remains overweight with noncompliance with diet and exercise.  MED - Compliant with CV meds listed below without notable side effects. HISTORY/ALLERGIES/ROS     MedHx:  has a past medical history of Bilateral cataracts, Bladder stones, Blepharitis, Cancer ([address] Hypertension, Kidney stones, Obesity, Pneumonia, and SOB (shortness of breath). SurgHx:  has a past surgical history that includes Skin cancer excision; Cholecystectomy; Prostatectomy; Colonoscopy; Cystocopy (8/13/12); other surgical history (8/25/12); and eye surgery. SocHx:  reports that he quit smoking about 36 years ago. He has a 38.00 pack-year smoking history. He has never used smokeless tobacco. He reports that he does not drink alcohol or use drugs. FamHx: family history includes Bleeding Prob (age of onset: 47) in his father; Dementia in his mother; High Blood Pressure in his mother; High Cholesterol in his mother; Other (age of onset: [de-identified]) in his mother.    Allerg: Oxycodone-acetaminophen and Adhesive tape   ROS: [x]Full ROS obtained and negative except as mentioned in HPI     MEDICATIONS      Current Outpatient Medications   Medication Sig Dispense Refill    amLODIPine (NORVASC) 10 MG tablet Take 1 tablet by mouth daily 90 tablet 3    propranolol (INDERAL) 20 MG tablet TAKE 1 TABLET BY MOUTH DAILY 90 tablet 3    torsemide (DEMADEX) 20 MG tablet Take 1 tablet by mouth daily 30 tablet 5    levothyroxine (SYNTHROID) 75 MCG tablet Take 1 tablet by mouth Daily 90 tablet 3    potassium chloride (KLOR-CON M) 10 MEQ extended release tablet Take 1 tablet by mouth daily 90 tablet 3    primidone (MYSOLINE) 50 MG tablet TAKE 1 TABLET BY MOUTH DAILY 90 tablet 3    donepezil (ARICEPT) 5 MG tablet TAKE 1 TABLET BY MOUTH EVERY NIGHT 90 tablet 3    amLODIPine (NORVASC) 10 MG tablet Take 1 tablet by mouth daily 90 tablet 3    Calcium Carb-Cholecalciferol (CALCIUM/VITAMIN D PO) Take by mouth 1200 mg qd      aspirin 81 MG tablet Take 81 mg by mouth daily.  multivitamin (ANTIOXIDANT;PROSIGHT) TABS per tablet Take 1 tablet by mouth daily. No current facility-administered medications for this visit.       Reviewed with patient and will remain unchanged except as mentioned in A/P  PHYSICAL EXAM     Vitals:    04/29/21 1043   BP: 128/60   Pulse: 60   SpO2: 98%      Gen Alert, coop, no distress Heart  Rrr, no mrg   Head NC, AT, no abnorm Abd  Soft, NT, +BS, no mass, no OM   Eyes PER, conj/corn clear Ext  Ext nl, AT, no C/C/E   Nose Nares nl, no drain, NT Pulse 2+ and symmetric   Throat Lips, mucosa, tongue nl Skin Col/text/turg nl, no vis rash/les   Neck S/S, TM, NT, no bruit/JVD Psych Nl mood and affect   Lung CTA-B, unlabored, no DTP Lymph   No cervical or axillary LA   Ch wall NT, no deform Neuro  Nl gross M/S exam     ASSESSMENT AND PLAN     *CAD   Date EF Results   Sx   No concerning   LHC   Patient declined   MPI 3/12  Mixed inferior I/S   TTE 8/16 55% Mild AI, mild to mod TR   Plan   Continue aggressive medical treatment at doses above  Refuses heart cath with PCPs and other pertinent consultants, documenting information in the EMR, independently interpreting results and communicating to family and coordination of patient care. If you have questions, please do not hesitate to call me. I look forward to following Abdirashid Patino along with you.     Sincerely,        Kleber Montenegro MD

## 2021-04-29 NOTE — PROGRESS NOTES
[address]     H+P // CONSULT // OUTPATIENT VISIT // FOLLOWUP VISIT     Referring Doctor FAIZAN Campoverde - CNP   Encounter Type Followup     CHIEF COMPLAINT     Visit Type Chronic   Symptoms None   Problems CAD, AI, HTN, Obesity     HISTORY OF PRESENT ILLNESS      GEN - Doing well. No new concerns. New dx of leukemia.  CAD - Denies cp, sob, dizziness, syncope, palpitations.  AI  - historically mild. No sob.  HTN - Ambulatory BP good range. No HA or dizziness.  OBESITY - remains overweight with noncompliance with diet and exercise.  MED - Compliant with CV meds listed below without notable side effects. HISTORY/ALLERGIES/ROS     MedHx:  has a past medical history of Bilateral cataracts, Bladder stones, Blepharitis, Cancer ([address] Hypertension, Kidney stones, Obesity, Pneumonia, and SOB (shortness of breath). SurgHx:  has a past surgical history that includes Skin cancer excision; Cholecystectomy; Prostatectomy; Colonoscopy; Cystocopy (8/13/12); other surgical history (8/25/12); and eye surgery. SocHx:  reports that he quit smoking about 36 years ago. He has a 38.00 pack-year smoking history. He has never used smokeless tobacco. He reports that he does not drink alcohol or use drugs. FamHx: family history includes Bleeding Prob (age of onset: 47) in his father; Dementia in his mother; High Blood Pressure in his mother; High Cholesterol in his mother; Other (age of onset: [de-identified]) in his mother.    Allerg: Oxycodone-acetaminophen and Adhesive tape   ROS: [x]Full ROS obtained and negative except as mentioned in HPI     MEDICATIONS      Current Outpatient Medications   Medication Sig Dispense Refill    amLODIPine (NORVASC) 10 MG tablet Take 1 tablet by mouth daily 90 tablet 3    propranolol (INDERAL) 20 MG tablet TAKE 1 TABLET BY MOUTH DAILY 90 tablet 3    torsemide (DEMADEX) 20 MG tablet Take 1 tablet by mouth daily 30 tablet 5    levothyroxine (SYNTHROID) 75 MCG tablet Take 1 tablet by mouth Daily 90 tablet 3    potassium chloride (KLOR-CON M) 10 MEQ extended release tablet Take 1 tablet by mouth daily 90 tablet 3    primidone (MYSOLINE) 50 MG tablet TAKE 1 TABLET BY MOUTH DAILY 90 tablet 3    donepezil (ARICEPT) 5 MG tablet TAKE 1 TABLET BY MOUTH EVERY NIGHT 90 tablet 3    amLODIPine (NORVASC) 10 MG tablet Take 1 tablet by mouth daily 90 tablet 3    Calcium Carb-Cholecalciferol (CALCIUM/VITAMIN D PO) Take by mouth 1200 mg qd      aspirin 81 MG tablet Take 81 mg by mouth daily.  multivitamin (ANTIOXIDANT;PROSIGHT) TABS per tablet Take 1 tablet by mouth daily. No current facility-administered medications for this visit. Reviewed with patient and will remain unchanged except as mentioned in A/P  PHYSICAL EXAM     Vitals:    04/29/21 1043   BP: 128/60   Pulse: 60   SpO2: 98%      Gen Alert, coop, no distress Heart  Rrr, no mrg   Head NC, AT, no abnorm Abd  Soft, NT, +BS, no mass, no OM   Eyes PER, conj/corn clear Ext  Ext nl, AT, no C/C/E   Nose Nares nl, no drain, NT Pulse 2+ and symmetric   Throat Lips, mucosa, tongue nl Skin Col/text/turg nl, no vis rash/les   Neck S/S, TM, NT, no bruit/JVD Psych Nl mood and affect   Lung CTA-B, unlabored, no DTP Lymph   No cervical or axillary LA   Ch wall NT, no deform Neuro  Nl gross M/S exam     ASSESSMENT AND PLAN     *CAD   Date EF Results   Sx   No concerning   LHC   Patient declined   MPI 3/12  Mixed inferior I/S   TTE 8/16 55% Mild AI, mild to mod TR   Plan   Continue aggressive medical treatment at doses above  Refuses heart cath for abnormal stress historically   *AI   Date EF Detail   Sx   No concerning   TTE 8/16 55% Mild AI   Plan   Continued observation   *HTN  Status Controlled  Plan Counseled on diet/salt/exercise/weight, continue meds at doses above  *OBESITY  Status Uncontrolled with a BMI of Body mass index is 31 kg/m². , available historical weights reviewed personally  Plan Counseled on diet, exercise, weight loss options in detail  *COMPLIANCE  Status Compliant  Plan Discussed importance of compliance with meds/diet/salt/exercise; avoid tob/alc/drugs; patient verbalized understanding  *FOLLOWUP  6 months    [address] [place] [address] Franko Tsang, am scribing for and in the presence of Murphy Crenshaw MD.   Signed, Franko Chung 04/29/21 7:32 AM   Provider Mason Dennis is working as a scribe for and in the presence of me (Murphy Crenshaw MD). Working as a scribe, Franko Chung may have prepopulated components of this note with my historical  intellectual property under my direct supervision. Any additions to this intellectual property were performed in my presence and at my direction. Furthermore, the content and accuracy of this note have been reviewed by me Murphy Crenshaw MD).  4/29/2021 10:55 AM  CODING     Category Diagnosis   Stable chronic illness  (45165/76100 - 2 or more) CAD, AI, HTN, OBESITY   Chronic illness with: Exac, progr or SA of Tx  (30134/64781 - 1 or more)    Undiagnosed new problem with: uncertain prognosis  (36506/24150 - 1 or more)    Acute illness with systemic Sx  (70167/66212 - 1 or more)    Acute, complicated injury  (32063/13200 - 1 or more)    15917 1 or more chronic illness with exacerbation, progression or SA of treatment    Time  30-39 minutes spent preparing to see patient including reviewing patient history/prior tests/prior consults, performing a medical exam, counseling and educating patient/family/caregiver, ordering medications/tests/procedures, referring and communicating with PCPs and other pertinent consultants, documenting information in the EMR, independently interpreting results and communicating to family and coordination of patient care.

## 2021-05-11 RX ORDER — TORSEMIDE 20 MG/1
20 TABLET ORAL DAILY
Qty: 30 TABLET | Refills: 5 | Status: SHIPPED | OUTPATIENT
Start: 2021-05-11 | End: 2021-11-08

## 2021-06-28 ENCOUNTER — OFFICE VISIT (OUTPATIENT)
Dept: INTERNAL MEDICINE CLINIC | Age: 86
End: 2021-06-28
Payer: MEDICARE

## 2021-06-28 VITALS
SYSTOLIC BLOOD PRESSURE: 128 MMHG | HEART RATE: 46 BPM | DIASTOLIC BLOOD PRESSURE: 60 MMHG | WEIGHT: 232 LBS | OXYGEN SATURATION: 98 % | BODY MASS INDEX: 30.61 KG/M2

## 2021-06-28 DIAGNOSIS — F03.90 DEMENTIA WITHOUT BEHAVIORAL DISTURBANCE, UNSPECIFIED DEMENTIA TYPE: ICD-10-CM

## 2021-06-28 DIAGNOSIS — I48.0 PAF (PAROXYSMAL ATRIAL FIBRILLATION) (HCC): ICD-10-CM

## 2021-06-28 DIAGNOSIS — E03.9 ACQUIRED HYPOTHYROIDISM: Primary | ICD-10-CM

## 2021-06-28 DIAGNOSIS — C61 MALIGNANT NEOPLASM OF PROSTATE (HCC): ICD-10-CM

## 2021-06-28 DIAGNOSIS — N17.9 AKI (ACUTE KIDNEY INJURY) (HCC): ICD-10-CM

## 2021-06-28 DIAGNOSIS — C91.10 CHRONIC LYMPHOCYTIC LEUKEMIA OF B-CELL TYPE NOT HAVING ACHIEVED REMISSION (HCC): ICD-10-CM

## 2021-06-28 DIAGNOSIS — I10 ESSENTIAL HYPERTENSION: ICD-10-CM

## 2021-06-28 DIAGNOSIS — I70.219 ATHEROSCLEROSIS OF NATIVE ARTERY OF LOWER EXTREMITY WITH INTERMITTENT CLAUDICATION, UNSPECIFIED LATERALITY (HCC): ICD-10-CM

## 2021-06-28 DIAGNOSIS — E03.9 ACQUIRED HYPOTHYROIDISM: ICD-10-CM

## 2021-06-28 DIAGNOSIS — D68.9 COAGULATION DEFECT (HCC): ICD-10-CM

## 2021-06-28 PROCEDURE — G8427 DOCREV CUR MEDS BY ELIG CLIN: HCPCS | Performed by: NURSE PRACTITIONER

## 2021-06-28 PROCEDURE — 1123F ACP DISCUSS/DSCN MKR DOCD: CPT | Performed by: NURSE PRACTITIONER

## 2021-06-28 PROCEDURE — 1036F TOBACCO NON-USER: CPT | Performed by: NURSE PRACTITIONER

## 2021-06-28 PROCEDURE — 99214 OFFICE O/P EST MOD 30 MIN: CPT | Performed by: NURSE PRACTITIONER

## 2021-06-28 PROCEDURE — G8417 CALC BMI ABV UP PARAM F/U: HCPCS | Performed by: NURSE PRACTITIONER

## 2021-06-28 PROCEDURE — 4040F PNEUMOC VAC/ADMIN/RCVD: CPT | Performed by: NURSE PRACTITIONER

## 2021-06-28 RX ORDER — IBRUTINIB 420 MG/1
420 TABLET, FILM COATED ORAL DAILY
COMMUNITY

## 2021-06-28 RX ORDER — ALLOPURINOL 100 MG/1
100 TABLET ORAL DAILY
COMMUNITY

## 2021-06-28 NOTE — PROGRESS NOTES
SUBJECTIVE:    Patient ID: Rose Stevenson is a 80 y.o. male. CC: Hypertension, dyslipidemia, hypothyroidism, renal insufficiency, atrial fibrillation    HPI: The patient presents to the office today for follow-up of chronic medical conditions. He has no new specific acute complaint today. At a recent appointment, he was found to have supraclavicular lymphadenopathy. He was subsequently seen by hematology and diagnosed with B-cell CLL. He remains under their care. Lymphadenopathy has improved. .    He has a history of hypertension and dyslipidemia. He denies any chest pain or shortness of breath. History of hypothyroidism. He is compliant with Synthroid. There is some question about recent medication availability. Most recent TSH was abnormal which was likely related. He has mild renal insufficiency which is being monitored. He has a history of atrial fibrillation, coronary artery disease. He remains under the care of cardiology.       Past Medical History:   Diagnosis Date    Bilateral cataracts     Bladder stones     Blepharitis     Cancer ([facility]     prostate    Hypertension     Kidney stones     with bladder stones    Obesity     Pneumonia     SOB (shortness of breath)         Current Outpatient Medications   Medication Sig Dispense Refill    allopurinol (ZYLOPRIM) 100 MG tablet Take 100 mg by mouth daily      ibrutinib (IMBRUVICA) 420 MG tablet Take 420 mg by mouth daily      torsemide (DEMADEX) 20 MG tablet TAKE 1 TABLET BY MOUTH DAILY 30 tablet 5    propranolol (INDERAL) 20 MG tablet TAKE 1 TABLET BY MOUTH DAILY 90 tablet 3    levothyroxine (SYNTHROID) 75 MCG tablet Take 1 tablet by mouth Daily 90 tablet 3    potassium chloride (KLOR-CON M) 10 MEQ extended release tablet Take 1 tablet by mouth daily 90 tablet 3    primidone (MYSOLINE) 50 MG tablet TAKE 1 TABLET BY MOUTH DAILY 90 tablet 3    donepezil (ARICEPT) 5 MG tablet TAKE 1 TABLET BY MOUTH EVERY NIGHT 90 tablet 3 Interpretation of Total Score Depression Severity: 1-4 = Minimal depression, 5-9 = Mild depression, 10-14 = Moderate depression, 15-19 = Moderately severe depression, 20-27 =Severe depression        ASSESSMENT/PLAN:  Aga Rizo was seen today for hypertension and other. Diagnoses and all orders for this visit:    Acquired hypothyroidism  -     TSH without Reflex; Future    ROSIE (acute kidney injury) ([facility]  -     RENAL FUNCTION PANEL; Future    Dementia without behavioral disturbance, unspecified dementia type ([address]  -Chronic, stable  -Continue current regimen    Coagulation defect ([address]  -He is under the care of hematology/oncology    Malignant neoplasm of prostate ([facility]  -History of prostate cancer.  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  - Continue current regimen. 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 He is getting chemotherapy.   He is under the care of hem/onc  -Lymphadenopathy improved      Nonda Ply, APRN - CNP

## 2021-06-29 ENCOUNTER — OFFICE VISIT (OUTPATIENT)
Dept: ENT CLINIC | Age: 86
End: 2021-06-29
Payer: MEDICARE

## 2021-06-29 VITALS — TEMPERATURE: 96.9 F | HEART RATE: 59 BPM | SYSTOLIC BLOOD PRESSURE: 131 MMHG | DIASTOLIC BLOOD PRESSURE: 74 MMHG

## 2021-06-29 DIAGNOSIS — R59.1 LYMPHADENOPATHY OF HEAD AND NECK: ICD-10-CM

## 2021-06-29 DIAGNOSIS — H61.23 BILATERAL IMPACTED CERUMEN: Primary | ICD-10-CM

## 2021-06-29 DIAGNOSIS — C91.10 CLL (CHRONIC LYMPHOCYTIC LEUKEMIA) (HCC): ICD-10-CM

## 2021-06-29 LAB
ALBUMIN SERPL-MCNC: 4.4 G/DL (ref 3.4–5)
ANION GAP SERPL CALCULATED.3IONS-SCNC: 11 MMOL/L (ref 3–16)
BUN BLDV-MCNC: 27 MG/DL (ref 7–20)
CALCIUM SERPL-MCNC: 9 MG/DL (ref 8.3–10.6)
CHLORIDE BLD-SCNC: 101 MMOL/L (ref 99–110)
CO2: 27 MMOL/L (ref 21–32)
CREAT SERPL-MCNC: 1.5 MG/DL (ref 0.8–1.3)
GFR AFRICAN AMERICAN: 54
GFR NON-AFRICAN AMERICAN: 44
GLUCOSE BLD-MCNC: 67 MG/DL (ref 70–99)
PHOSPHORUS: 4.2 MG/DL (ref 2.5–4.9)
POTASSIUM SERPL-SCNC: 4.2 MMOL/L (ref 3.5–5.1)
SODIUM BLD-SCNC: 139 MMOL/L (ref 136–145)
TSH SERPL DL<=0.05 MIU/L-ACNC: 9.27 UIU/ML (ref 0.27–4.2)

## 2021-06-29 PROCEDURE — 69210 REMOVE IMPACTED EAR WAX UNI: CPT | Performed by: OTOLARYNGOLOGY

## 2021-06-29 PROCEDURE — 4040F PNEUMOC VAC/ADMIN/RCVD: CPT | Performed by: OTOLARYNGOLOGY

## 2021-06-29 PROCEDURE — 1123F ACP DISCUSS/DSCN MKR DOCD: CPT | Performed by: OTOLARYNGOLOGY

## 2021-06-29 PROCEDURE — G8417 CALC BMI ABV UP PARAM F/U: HCPCS | Performed by: OTOLARYNGOLOGY

## 2021-06-29 PROCEDURE — 1036F TOBACCO NON-USER: CPT | Performed by: OTOLARYNGOLOGY

## 2021-06-29 PROCEDURE — G8427 DOCREV CUR MEDS BY ELIG CLIN: HCPCS | Performed by: OTOLARYNGOLOGY

## 2021-06-29 ASSESSMENT — ENCOUNTER SYMPTOMS
GASTROINTESTINAL NEGATIVE: 1
RESPIRATORY NEGATIVE: 1

## 2021-06-29 NOTE — PROGRESS NOTES
Patient is here for yearly checkup in regards to accumulation of cerumen. He has recently been diagnosed with chronic lymphocytic leukemia for which she is now undergoing therapy due to a supraclavicular node noted on the right side. Otherwise she is doing very well. He has had no fever and no difficulty with balance. No ear pain is been present. His hearing is stable and he has no significant tinnitus change. Currently he appears in no acute distress. Ear examination does reveal cerumen present on both sides removed with a combination of irrigation, suctioning, curettage, and cleaning with peroxide. Upon removal, the tympanic membranes appear normal and the ear canals are now clear. Oral examination is unremarkable. The nasal mucosa is normal as well. The neck reveals no significant adenopathy other than a rather large node present in the supraclavicular area on the right side measuring approximately 3 cm in diameter. This is nontender. The thyroid is normal in its appearance and feel. No other normal masses are present. Findings are consistent with the cerumen impactions which have been removed. He will return as needed.

## 2021-06-30 DIAGNOSIS — E03.9 ACQUIRED HYPOTHYROIDISM: ICD-10-CM

## 2021-06-30 RX ORDER — LEVOTHYROXINE SODIUM 88 UG/1
88 TABLET ORAL DAILY
Qty: 90 TABLET | Refills: 3 | Status: SHIPPED | OUTPATIENT
Start: 2021-06-30 | End: 2022-03-03 | Stop reason: SDUPTHER

## 2021-07-05 DIAGNOSIS — F03.90 DEMENTIA WITHOUT BEHAVIORAL DISTURBANCE, UNSPECIFIED DEMENTIA TYPE: ICD-10-CM

## 2021-07-06 RX ORDER — DONEPEZIL HYDROCHLORIDE 5 MG/1
TABLET, FILM COATED ORAL
Qty: 90 TABLET | Refills: 3 | Status: SHIPPED | OUTPATIENT
Start: 2021-07-06 | End: 2021-08-17

## 2021-08-16 DIAGNOSIS — R25.1 TREMOR: ICD-10-CM

## 2021-08-16 DIAGNOSIS — F03.90 DEMENTIA WITHOUT BEHAVIORAL DISTURBANCE, UNSPECIFIED DEMENTIA TYPE: ICD-10-CM

## 2021-08-17 RX ORDER — PRIMIDONE 50 MG/1
50 TABLET ORAL DAILY
Qty: 90 TABLET | Refills: 3 | Status: SHIPPED | OUTPATIENT
Start: 2021-08-17 | End: 2022-08-01

## 2021-08-17 RX ORDER — DONEPEZIL HYDROCHLORIDE 5 MG/1
TABLET, FILM COATED ORAL
Qty: 90 TABLET | Refills: 3 | Status: SHIPPED | OUTPATIENT
Start: 2021-08-17 | End: 2022-08-01

## 2021-10-01 RX ORDER — POTASSIUM CHLORIDE 750 MG/1
10 TABLET, EXTENDED RELEASE ORAL DAILY
Qty: 90 TABLET | Refills: 3 | Status: SHIPPED | OUTPATIENT
Start: 2021-10-01 | End: 2022-10-03

## 2021-10-28 ENCOUNTER — OFFICE VISIT (OUTPATIENT)
Dept: INTERNAL MEDICINE CLINIC | Age: 86
End: 2021-10-28
Payer: MEDICARE

## 2021-10-28 VITALS
SYSTOLIC BLOOD PRESSURE: 136 MMHG | WEIGHT: 237 LBS | HEART RATE: 58 BPM | BODY MASS INDEX: 31.41 KG/M2 | HEIGHT: 73 IN | OXYGEN SATURATION: 98 % | DIASTOLIC BLOOD PRESSURE: 62 MMHG

## 2021-10-28 DIAGNOSIS — Z12.5 PROSTATE CANCER SCREENING: ICD-10-CM

## 2021-10-28 DIAGNOSIS — Z23 NEED FOR INFLUENZA VACCINATION: ICD-10-CM

## 2021-10-28 DIAGNOSIS — E55.9 VITAMIN D DEFICIENCY: ICD-10-CM

## 2021-10-28 DIAGNOSIS — C91.10 CHRONIC LYMPHOCYTIC LEUKEMIA OF B-CELL TYPE NOT HAVING ACHIEVED REMISSION (HCC): ICD-10-CM

## 2021-10-28 DIAGNOSIS — Z00.00 ROUTINE GENERAL MEDICAL EXAMINATION AT A HEALTH CARE FACILITY: Primary | ICD-10-CM

## 2021-10-28 LAB
PROSTATE SPECIFIC ANTIGEN: 0.89 NG/ML (ref 0–4)
VITAMIN D 25-HYDROXY: 39.9 NG/ML

## 2021-10-28 PROCEDURE — G0008 ADMIN INFLUENZA VIRUS VAC: HCPCS | Performed by: NURSE PRACTITIONER

## 2021-10-28 PROCEDURE — 90694 VACC AIIV4 NO PRSRV 0.5ML IM: CPT | Performed by: NURSE PRACTITIONER

## 2021-10-28 PROCEDURE — G8484 FLU IMMUNIZE NO ADMIN: HCPCS | Performed by: NURSE PRACTITIONER

## 2021-10-28 PROCEDURE — G0439 PPPS, SUBSEQ VISIT: HCPCS | Performed by: NURSE PRACTITIONER

## 2021-10-28 PROCEDURE — 1123F ACP DISCUSS/DSCN MKR DOCD: CPT | Performed by: NURSE PRACTITIONER

## 2021-10-28 PROCEDURE — 4040F PNEUMOC VAC/ADMIN/RCVD: CPT | Performed by: NURSE PRACTITIONER

## 2021-10-28 ASSESSMENT — PATIENT HEALTH QUESTIONNAIRE - PHQ9
2. FEELING DOWN, DEPRESSED OR HOPELESS: 0
SUM OF ALL RESPONSES TO PHQ QUESTIONS 1-9: 0
1. LITTLE INTEREST OR PLEASURE IN DOING THINGS: 0
SUM OF ALL RESPONSES TO PHQ QUESTIONS 1-9: 0
SUM OF ALL RESPONSES TO PHQ9 QUESTIONS 1 & 2: 0
SUM OF ALL RESPONSES TO PHQ QUESTIONS 1-9: 0

## 2021-10-28 ASSESSMENT — LIFESTYLE VARIABLES: HOW OFTEN DO YOU HAVE A DRINK CONTAINING ALCOHOL: 0

## 2021-10-28 NOTE — PATIENT INSTRUCTIONS
Personalized Preventive Plan for Yony Cunningham - 10/28/2021  Medicare offers a range of preventive health benefits. Some of the tests and screenings are paid in full while other may be subject to a deductible, co-insurance, and/or copay. Some of these benefits include a comprehensive review of your medical history including lifestyle, illnesses that may run in your family, and various assessments and screenings as appropriate. After reviewing your medical record and screening and assessments performed today your provider may have ordered immunizations, labs, imaging, and/or referrals for you. A list of these orders (if applicable) as well as your Preventive Care list are included within your After Visit Summary for your review. Other Preventive Recommendations:    · A preventive eye exam performed by an eye specialist is recommended every 1-2 years to screen for glaucoma; cataracts, macular degeneration, and other eye disorders. · A preventive dental visit is recommended every 6 months. · Try to get at least 150 minutes of exercise per week or 10,000 steps per day on a pedometer . · Order or download the FREE \"Exercise & Physical Activity: Your Everyday Guide\" from [employer]. Call 7-907.929.5658 or search [employer] online. · You need 3629-6670 mg of calcium and 1825-6591 IU of vitamin D per day. It is possible to meet your calcium requirement with diet alone, but a vitamin D supplement is usually necessary to meet this goal.  · When exposed to the sun, use a sunscreen that protects against both UVA and UVB radiation with an SPF of 30 or greater. Reapply every 2 to 3 hours or after sweating, drying off with a towel, or swimming. · Always wear a seat belt when traveling in a car. Always wear a helmet when riding a bicycle or motorcycle.

## 2021-10-28 NOTE — PROGRESS NOTES
Medicare Annual Wellness Visit  Name: Gary Fish Date: 10/28/2021   MRN: [id number] Sex: Male   Age: 80 y.o. Ethnicity: Non- / Non    : 1934 Race: White (non-)      Daniel Paul is here for Medicare AWV    Screenings for behavioral, psychosocial and functional/safety risks, and cognitive dysfunction are all negative except as indicated below. These results, as well as other patient data from the [address] form, are documented in Flowsheets linked to this Encounter. Allergies   Allergen Reactions    Oxycodone-Acetaminophen Other (See Comments)     halucinations  Other reaction(s): hallucinations    Adhesive Tape      Skin becomes raw       Prior to Visit Medications    Medication Sig Taking? Authorizing Provider   potassium chloride (KLOR-CON M) 10 MEQ extended release tablet TAKE 1 TABLET BY MOUTH DAILY  FAIZAN Bassett - CNP   donepezil (ARICEPT) 5 MG tablet TAKE 1 TABLET BY MOUTH EVERY NIGHT  FAIZAN Bassett - CNP   primidone (MYSOLINE) 50 MG tablet TAKE 1 TABLET BY MOUTH DAILY  FAIZAN Bassett - CNP   levothyroxine (SYNTHROID) 88 MCG tablet Take 1 tablet by mouth Daily  FAIZAN Bassett - CNP   allopurinol (ZYLOPRIM) 100 MG tablet Take 100 mg by mouth daily  Historical Provider, MD   ibrutinib (IMBRUVICA) 420 MG tablet Take 420 mg by mouth daily  Historical Provider, MD   torsemide (DEMADEX) 20 MG tablet TAKE 1 TABLET BY MOUTH DAILY  FAIZAN Bassett CNP   propranolol (INDERAL) 20 MG tablet TAKE 1 TABLET BY MOUTH DAILY  Artur Covarrubias MD   amLODIPine (NORVASC) 10 MG tablet Take 1 tablet by mouth daily  Artur Covarrubias MD   Calcium Carb-Cholecalciferol (CALCIUM/VITAMIN D PO) Take by mouth 1200 mg qd  Historical Provider, MD   aspirin 81 MG tablet Take 81 mg by mouth daily. Historical Provider, MD   multivitamin (ANTIOXIDANT;PROSIGHT) TABS per tablet Take 1 tablet by mouth daily.   Historical Provider, MD       Past Medical History:   Diagnosis Date    Bilateral cataracts     Bladder stones     Blepharitis     Cancer ([address]     prostate    Hypertension     Kidney stones     with bladder stones    Obesity     Pneumonia     SOB (shortness of breath)        Past Surgical History:   Procedure Laterality Date    CHOLECYSTECTOMY      '98     COLONOSCOPY      polypectomy    CYSTOSCOPY  8/13/12    left retrograde    EYE SURGERY      OTHER SURGICAL HISTORY  8/25/12    left ureteral lithotomy    PROSTATECTOMY      with abd panniculectomy '98    SKIN CANCER EXCISION      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       Family History   Problem Relation Age of Onset    Dementia Mother     Other Mother [de-identified]        pneumonia    High Blood Pressure Mother     High Cholesterol Mother     Bleeding Prob Father 47        blood poisoning    Heart Disease Neg Hx        CareTeam (Including outside providers/suppliers regularly involved in providing care):   Patient Care Team:  FAIZAN Cisneros CNP as PCP - General (Nurse Practitioner)  FAIZAN Cisneros CNP as PCP - [facility] Empaneled Provider    Wt Readings from Last 3 Encounters:   10/28/21 237 lb (107.5 kg)   06/28/21 232 lb (105.2 kg)   04/29/21 235 lb (106.6 kg)     Vitals:    10/28/21 1138   BP: 136/62   Pulse: 58   SpO2: 98%   Weight: 237 lb (107.5 kg)   Height: 6' 1\" (1.854 m)     Body mass index is 31.27 kg/m². Based upon direct observation of the patient, evaluation of cognition reveals recent and remote memory intact. Patient's complete Health Risk Assessment and screening values have been reviewed and are found in Flowsheets. The following problems were reviewed today and where indicated follow up appointments were made and/or referrals ordered. Positive Risk Factor Screenings with Interventions:     Fall Risk:  Timed Up and Go Test > 12 seconds?  (Complete if either Fall Risk answers are Yes): (!) yes  2 or more falls in past year?: (!) yes  Fall with injury in past year?: (!) yes  Fall Risk Interventions:    · Home safety tips provided         Health Habits/Nutrition:  Health Habits/Nutrition  Do you exercise for at least 20 minutes 2-3 times per week?: (!) No  Have you lost any weight without trying in the past 3 months?: No  Do you eat only one meal per day?: No  Have you seen the dentist within the past year?: Yes  Body mass index: (!) 31.26  Health Habits/Nutrition Interventions:  · Monitor       Personalized Preventive Plan   Current Health Maintenance Status  Immunization History   Administered Date(s) Administered    COVID-19, Angelo Chavez PF, 30mcg/0.3mL 01/21/2021, 02/11/2021    Influenza 09/22/2011    Influenza Virus Vaccine 11/07/2002, 10/27/2003, 10/29/2004, 09/22/2011, 09/18/2014, 10/15/2015    Influenza Whole 10/23/2009    Influenza, High Dose (Fluzone 65 yrs and older) 09/18/2014, 10/15/2015, 10/04/2016, 09/21/2017, 10/25/2018    Influenza, Quadv, adjuvanted, 65 yrs +, IM, PF (Fluad) 10/23/2020, 10/28/2021    Influenza, Triv, inactivated, subunit, adjuvanted, IM (Fluad 65 yrs and older) 09/11/2019    PPD Test 08/01/2012    Pneumococcal Conjugate 13-valent ([id number]) 10/04/2016    Pneumococcal Conjugate 7-valent (Prevnar7) 10/23/2007    Pneumococcal Polysaccharide ([id number]) 01/01/2000, 11/08/2017, 10/23/2020    Td, unspecified formulation 10/27/2003    Tdap (Boostrix, Adacel) 06/21/2012    Zoster Recombinant (Shingrix) 04/04/2019, 07/19/2019        Health Maintenance   Topic Date Due    COVID-19 Vaccine (3 - Pfizer risk 3-dose series) 03/11/2021    Annual Wellness Visit (AWV)  10/24/2021    PSA counseling  10/23/2021    DTaP/Tdap/Td vaccine (2 - Td or Tdap) 06/21/2022    Potassium monitoring  06/28/2022    Creatinine monitoring  06/28/2022    Flu vaccine  Completed    Shingles Vaccine  Completed    Pneumococcal 65+ yrs at Risk Vaccine  Completed    Hepatitis A vaccine  Aged Out    Hepatitis B vaccine  Aged Out    Hib vaccine  Aged Out    Meningococcal (ACWY) vaccine  Aged Out     Recommendations for [employer] Due: see orders and patient instructions/AVS.  . Recommended screening schedule for the next 5-10 years is provided to the patient in written form: see Patient Instructions/AVS.        Assessment/Plan:  Serjio Carvalho was seen today for medicare aw. 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

## 2021-11-08 RX ORDER — TORSEMIDE 20 MG/1
20 TABLET ORAL DAILY
Qty: 30 TABLET | Refills: 5 | Status: SHIPPED | OUTPATIENT
Start: 2021-11-08 | End: 2022-05-03

## 2021-11-08 RX ORDER — AMLODIPINE BESYLATE 10 MG/1
10 TABLET ORAL DAILY
Qty: 90 TABLET | Refills: 3 | Status: SHIPPED | OUTPATIENT
Start: 2021-11-08 | End: 2022-11-01

## 2021-11-08 NOTE — TELEPHONE ENCOUNTER
Received refill request for Amlodipine from [address] [place] [address].      Last OV: 04/29/2021 w/ DCE    Last Labs: 06/28/2021 Renal     Last Refill: 05/18/2020 #90 w/ 3 refills     Next Appointment: 11/11/2021 izabella/ Elver Maher

## 2021-11-10 NOTE — PROGRESS NOTES
[address]  H+P // [facility] // OP VISIT // FU VISIT    Marsha Juwanmarilu, APRN - CNP  ENC TYPE FU    CC HX HPI   GEN  Doing well. No new concerns. Recent dx leukemia, off asa due to bleeding risk. CAD  Denies cp, sob, dizzy, syncope, palps. AI  No cp, sob. HTN  Amb bp in good range, no ha or dizzy. OBESITY  BMI remains elevated. Difficulty with diet and exercise   MED  Compliant with CV meds listed below w/out reported sa. HISTORY/ALLERGY/ROS   MedHx  has a past medical history of Bilateral cataracts, Bladder stones, Blepharitis, Cancer ([address] Hypertension, Kidney stones, Obesity, Pneumonia, and SOB (shortness of breath). SurgHx  has a past surgical history that includes Skin cancer excision; Cholecystectomy; Prostatectomy; Colonoscopy; Cystocopy (8/13/12); other surgical history (8/25/12); and eye surgery. SocHx  reports that he quit smoking about 37 years ago. He has a 38.00 pack-year smoking history. He has never used smokeless tobacco. He reports that he does not drink alcohol and does not use drugs. FamHx family history includes Bleeding Prob (age of onset: 47) in his father; Dementia in his mother; High Blood Pressure in his mother; High Cholesterol in his mother; Other (age of onset: [de-identified]) in his mother.    Allerg Oxycodone-acetaminophen and Adhesive tape   ROS [x]Full ROS obtained and negative except as mentioned in HPI      MEDICATIONS      Current Outpatient Medications   Medication Sig Dispense Refill    torsemide (DEMADEX) 20 MG tablet TAKE 1 TABLET BY MOUTH DAILY 30 tablet 5    amLODIPine (NORVASC) 10 MG tablet TAKE 1 TABLET BY MOUTH DAILY 90 tablet 3    potassium chloride (KLOR-CON M) 10 MEQ extended release tablet TAKE 1 TABLET BY MOUTH DAILY 90 tablet 3    donepezil (ARICEPT) 5 MG tablet TAKE 1 TABLET BY MOUTH EVERY NIGHT 90 tablet 3    primidone (MYSOLINE) 50 MG tablet TAKE 1 TABLET BY MOUTH DAILY 90 tablet 3    levothyroxine (SYNTHROID) 88 MCG tablet Take 1 tablet by mouth Daily 90 tablet 3    allopurinol (ZYLOPRIM) 100 MG tablet Take 100 mg by mouth daily      ibrutinib (IMBRUVICA) 420 MG tablet Take 420 mg by mouth daily      propranolol (INDERAL) 20 MG tablet TAKE 1 TABLET BY MOUTH DAILY 90 tablet 3    Calcium Carb-Cholecalciferol (CALCIUM/VITAMIN D PO) Take by mouth 1200 mg qd      aspirin 81 MG tablet Take 81 mg by mouth daily.  multivitamin (ANTIOXIDANT;PROSIGHT) TABS per tablet Take 1 tablet by mouth daily. No current facility-administered medications for this visit. [x]Reviewed with patient and will remain unchanged except as mentioned in A/P    PHYSICAL EXAM   Vitals:    11/11/21 1404   BP: 138/68   Pulse: 59   SpO2: 96%        Gen Alert, coop, no distress Heart  Rrr, 1/4   Head NC, AT, no abnorm Abd  Soft, NT, +BS, no mass, no OM   Eyes PER, conj/corn clear Ext  Ext nl, AT, no C/C/E   Nose Nares nl, no drain, NT Pulse 2+ and symmetric   Throat Lips, mucosa, tongue nl Skin Col/text/turg nl, no vis rash/les   Neck S/S, TM, NT, no bruit/JVD Psych Nl mood and affect   Lung CTA-B, unlabored, no DTP Lymph   No cervical or axillary LA   Ch wall NT, no deform Neuro  Nl gross M/S exam     ASSESSMENT AND PLAN   *CAD   Date EF Results   Sx   No concerning   LHC   Patient declined   MPI 3/12  Mixed inferior I/S   TTE 8/16 55% Mild AI, mild to mod TR   Plan   Continue aggressive medical treatment at doses above   *AI   Date EF Detail   Sx   No concerning   TTE 8/16 55% Mild AI   Plan   Continued observation   *HTN  Status Controlled  Plan Counseled on diet/salt/exercise/weight, continue meds at doses above  *OBESITY  Status Uncontrolled with a BMI of Body mass index is 30.89 kg/m². , available historical weights reviewed personally  Plan Counseled on diet, exercise, weight loss options in detail  *COMPLIANCE  Status Compliant  Plan Discussed importance of compliance with meds/diet/salt/exercise; avoid tob/alc/drugs; patient verbalized understanding  *FOLLOWUP  6 months    CODING   Category Diagnosis   Stable chronic illness  (44581/99145 - 2 or more) CAD, AI, HTN, OBESITY   Chronic illness w/: Exac, progr or SA of Tx  (12631/90445 - 1 or more)    Time 30-39 minutes spent preparing to see patient including reviewing patient history/prior tests/prior consults, performing a medical exam, counseling and educating patient/family/caregiver, ordering medications/tests/procedures, referring and communicating with PCPs and other pertinent consultants, documenting information in the EMR, independently interpreting results and communicating to family and coordination of patient care. Scribe Attestation  Pranav CABRERA, am scribing for and in the presence of Radha Luther MD.   SignedPranav RN. Provider Eleonora Amor is working as a scribe for and in the presence of mindi Luther MD). Working as a scribe, Hiro Martin may have prepopulated components of this note with my historical  intellectual property under my direct supervision. Any additions to this intellectual property were performed in my presence and at my direction. Furthermore, the content and accuracy of this note have been reviewed by mindi Luther MD).   11/11/2021 7:27 AM

## 2021-11-11 ENCOUNTER — OFFICE VISIT (OUTPATIENT)
Dept: CARDIOLOGY CLINIC | Age: 86
End: 2021-11-11
Payer: MEDICARE

## 2021-11-11 VITALS
DIASTOLIC BLOOD PRESSURE: 68 MMHG | HEART RATE: 59 BPM | SYSTOLIC BLOOD PRESSURE: 138 MMHG | WEIGHT: 234.1 LBS | OXYGEN SATURATION: 96 % | HEIGHT: 73 IN | BODY MASS INDEX: 31.03 KG/M2

## 2021-11-11 DIAGNOSIS — I35.1 NONRHEUMATIC AORTIC VALVE INSUFFICIENCY: Primary | ICD-10-CM

## 2021-11-11 PROCEDURE — 1123F ACP DISCUSS/DSCN MKR DOCD: CPT | Performed by: INTERNAL MEDICINE

## 2021-11-11 PROCEDURE — G8484 FLU IMMUNIZE NO ADMIN: HCPCS | Performed by: INTERNAL MEDICINE

## 2021-11-11 PROCEDURE — G8427 DOCREV CUR MEDS BY ELIG CLIN: HCPCS | Performed by: INTERNAL MEDICINE

## 2021-11-11 PROCEDURE — 4040F PNEUMOC VAC/ADMIN/RCVD: CPT | Performed by: INTERNAL MEDICINE

## 2021-11-11 PROCEDURE — 1036F TOBACCO NON-USER: CPT | Performed by: INTERNAL MEDICINE

## 2021-11-11 PROCEDURE — G8417 CALC BMI ABV UP PARAM F/U: HCPCS | Performed by: INTERNAL MEDICINE

## 2021-11-11 PROCEDURE — 99214 OFFICE O/P EST MOD 30 MIN: CPT | Performed by: INTERNAL MEDICINE

## 2021-12-30 NOTE — TELEPHONE ENCOUNTER
Received refill request for Propranolol from [facility].     Last ov: 2021 DCE    Last [id number]    Last Refill: 2021 #90 w/ 3 refills    Next appointment: 2022 DCE

## 2022-01-01 ENCOUNTER — APPOINTMENT (OUTPATIENT)
Dept: GENERAL RADIOLOGY | Age: 87
DRG: 871 | End: 2022-01-01
Payer: MEDICARE

## 2022-01-01 ENCOUNTER — TELEPHONE (OUTPATIENT)
Dept: INTERNAL MEDICINE CLINIC | Age: 87
End: 2022-01-01

## 2022-01-01 ENCOUNTER — HOSPITAL ENCOUNTER (INPATIENT)
Age: 87
LOS: 10 days | DRG: 871 | End: 2022-11-18
Attending: EMERGENCY MEDICINE | Admitting: HOSPITALIST
Payer: MEDICARE

## 2022-01-01 ENCOUNTER — APPOINTMENT (OUTPATIENT)
Dept: CT IMAGING | Age: 87
DRG: 871 | End: 2022-01-01
Payer: MEDICARE

## 2022-01-01 VITALS
TEMPERATURE: 96.5 F | OXYGEN SATURATION: 55 % | HEIGHT: 74 IN | DIASTOLIC BLOOD PRESSURE: 60 MMHG | SYSTOLIC BLOOD PRESSURE: 97 MMHG | WEIGHT: 206.35 LBS | HEART RATE: 21 BPM | BODY MASS INDEX: 26.48 KG/M2

## 2022-01-01 DIAGNOSIS — A41.9 SEPTICEMIA (HCC): Primary | ICD-10-CM

## 2022-01-01 DIAGNOSIS — J18.9 COMMUNITY ACQUIRED PNEUMONIA, UNSPECIFIED LATERALITY: ICD-10-CM

## 2022-01-01 DIAGNOSIS — J96.01 ACUTE RESPIRATORY FAILURE WITH HYPOXIA (HCC): ICD-10-CM

## 2022-01-01 DIAGNOSIS — R93.89 ABNORMAL CXR: ICD-10-CM

## 2022-01-01 DIAGNOSIS — I48.92 ATRIAL FLUTTER BY ELECTROCARDIOGRAM (HCC): ICD-10-CM

## 2022-01-01 DIAGNOSIS — Z85.6 HISTORY OF CHRONIC LYMPHOCYTIC LEUKEMIA: ICD-10-CM

## 2022-01-01 LAB
A/G RATIO: 0.8 (ref 1.1–2.2)
A/G RATIO: 0.9 (ref 1.1–2.2)
A/G RATIO: 1 (ref 1.1–2.2)
A/G RATIO: 1.4 (ref 1.1–2.2)
ALBUMIN SERPL-MCNC: 2.5 G/DL (ref 3.4–5)
ALBUMIN SERPL-MCNC: 2.6 G/DL (ref 3.4–5)
ALBUMIN SERPL-MCNC: 2.6 G/DL (ref 3.4–5)
ALBUMIN SERPL-MCNC: 2.7 G/DL (ref 3.4–5)
ALBUMIN SERPL-MCNC: 3 G/DL (ref 3.4–5)
ALBUMIN SERPL-MCNC: 3.1 G/DL (ref 3.4–5)
ALBUMIN SERPL-MCNC: 3.4 G/DL (ref 3.4–5)
ALP BLD-CCNC: 37 U/L (ref 40–129)
ALP BLD-CCNC: 45 U/L (ref 40–129)
ALP BLD-CCNC: 45 U/L (ref 40–129)
ALP BLD-CCNC: 56 U/L (ref 40–129)
ALP BLD-CCNC: 56 U/L (ref 40–129)
ALP BLD-CCNC: 57 U/L (ref 40–129)
ALT SERPL-CCNC: 33 U/L (ref 10–40)
ALT SERPL-CCNC: 39 U/L (ref 10–40)
ALT SERPL-CCNC: 48 U/L (ref 10–40)
ALT SERPL-CCNC: 59 U/L (ref 10–40)
ALT SERPL-CCNC: 60 U/L (ref 10–40)
ALT SERPL-CCNC: 61 U/L (ref 10–40)
AMORPHOUS: PRESENT
ANION GAP SERPL CALCULATED.3IONS-SCNC: 10 MMOL/L (ref 3–16)
ANION GAP SERPL CALCULATED.3IONS-SCNC: 10 MMOL/L (ref 3–16)
ANION GAP SERPL CALCULATED.3IONS-SCNC: 11 MMOL/L (ref 3–16)
ANION GAP SERPL CALCULATED.3IONS-SCNC: 12 MMOL/L (ref 3–16)
ANION GAP SERPL CALCULATED.3IONS-SCNC: 13 MMOL/L (ref 3–16)
ANION GAP SERPL CALCULATED.3IONS-SCNC: 13 MMOL/L (ref 3–16)
ANION GAP SERPL CALCULATED.3IONS-SCNC: 14 MMOL/L (ref 3–16)
ANION GAP SERPL CALCULATED.3IONS-SCNC: 16 MMOL/L (ref 3–16)
ANION GAP SERPL CALCULATED.3IONS-SCNC: 7 MMOL/L (ref 3–16)
ANION GAP SERPL CALCULATED.3IONS-SCNC: 9 MMOL/L (ref 3–16)
ANISOCYTOSIS: ABNORMAL
AST SERPL-CCNC: 108 U/L (ref 15–37)
AST SERPL-CCNC: 34 U/L (ref 15–37)
AST SERPL-CCNC: 36 U/L (ref 15–37)
AST SERPL-CCNC: 41 U/L (ref 15–37)
AST SERPL-CCNC: 41 U/L (ref 15–37)
AST SERPL-CCNC: 43 U/L (ref 15–37)
ATYPICAL LYMPHOCYTE RELATIVE PERCENT: 23 % (ref 0–6)
BACTERIA: ABNORMAL /HPF
BANDED NEUTROPHILS RELATIVE PERCENT: 2 % (ref 0–7)
BASE EXCESS VENOUS: -8.1 MMOL/L (ref -3–3)
BASOPHILS ABSOLUTE: 0 K/UL (ref 0–0.2)
BASOPHILS ABSOLUTE: 0.1 K/UL (ref 0–0.2)
BASOPHILS ABSOLUTE: 0.1 K/UL (ref 0–0.2)
BASOPHILS RELATIVE PERCENT: 0 %
BASOPHILS RELATIVE PERCENT: 0.8 %
BASOPHILS RELATIVE PERCENT: 0.9 %
BILIRUB SERPL-MCNC: 0.4 MG/DL (ref 0–1)
BILIRUB SERPL-MCNC: 0.4 MG/DL (ref 0–1)
BILIRUB SERPL-MCNC: 0.6 MG/DL (ref 0–1)
BILIRUBIN URINE: NEGATIVE
BLOOD CULTURE, ROUTINE: NORMAL
BLOOD, URINE: ABNORMAL
BUN BLDV-MCNC: 28 MG/DL (ref 7–20)
BUN BLDV-MCNC: 33 MG/DL (ref 7–20)
BUN BLDV-MCNC: 36 MG/DL (ref 7–20)
BUN BLDV-MCNC: 37 MG/DL (ref 7–20)
BUN BLDV-MCNC: 39 MG/DL (ref 7–20)
BUN BLDV-MCNC: 41 MG/DL (ref 7–20)
BUN BLDV-MCNC: 43 MG/DL (ref 7–20)
BUN BLDV-MCNC: 48 MG/DL (ref 7–20)
BUN BLDV-MCNC: 66 MG/DL (ref 7–20)
BUN BLDV-MCNC: 68 MG/DL (ref 7–20)
C-REACTIVE PROTEIN: 109.2 MG/L (ref 0–5.1)
C-REACTIVE PROTEIN: 141 MG/L (ref 0–5.1)
C-REACTIVE PROTEIN: 69.8 MG/L (ref 0–5.1)
C-REACTIVE PROTEIN: 73.5 MG/L (ref 0–5.1)
CALCIUM SERPL-MCNC: 8.6 MG/DL (ref 8.3–10.6)
CALCIUM SERPL-MCNC: 8.7 MG/DL (ref 8.3–10.6)
CALCIUM SERPL-MCNC: 8.8 MG/DL (ref 8.3–10.6)
CALCIUM SERPL-MCNC: 9.2 MG/DL (ref 8.3–10.6)
CALCIUM SERPL-MCNC: 9.3 MG/DL (ref 8.3–10.6)
CALCIUM SERPL-MCNC: 9.5 MG/DL (ref 8.3–10.6)
CARBOXYHEMOGLOBIN: 3.4 % (ref 0–1.5)
CHLORIDE BLD-SCNC: 101 MMOL/L (ref 99–110)
CHLORIDE BLD-SCNC: 107 MMOL/L (ref 99–110)
CHLORIDE BLD-SCNC: 109 MMOL/L (ref 99–110)
CHLORIDE BLD-SCNC: 110 MMOL/L (ref 99–110)
CHLORIDE BLD-SCNC: 111 MMOL/L (ref 99–110)
CHLORIDE BLD-SCNC: 111 MMOL/L (ref 99–110)
CHLORIDE URINE RANDOM: <20 MMOL/L
CLARITY: ABNORMAL
CO2: 15 MMOL/L (ref 21–32)
CO2: 16 MMOL/L (ref 21–32)
CO2: 17 MMOL/L (ref 21–32)
CO2: 18 MMOL/L (ref 21–32)
CO2: 19 MMOL/L (ref 21–32)
CO2: 22 MMOL/L (ref 21–32)
COLOR: YELLOW
COMMENT UA: ABNORMAL
CREAT SERPL-MCNC: 1.2 MG/DL (ref 0.8–1.3)
CREAT SERPL-MCNC: 1.3 MG/DL (ref 0.8–1.3)
CREAT SERPL-MCNC: 1.3 MG/DL (ref 0.8–1.3)
CREAT SERPL-MCNC: 1.4 MG/DL (ref 0.8–1.3)
CREAT SERPL-MCNC: 1.4 MG/DL (ref 0.8–1.3)
CREAT SERPL-MCNC: 1.6 MG/DL (ref 0.8–1.3)
CREAT SERPL-MCNC: 1.8 MG/DL (ref 0.8–1.3)
CREAT SERPL-MCNC: 1.8 MG/DL (ref 0.8–1.3)
CREATININE URINE: 120 MG/DL (ref 39–259)
CULTURE, BLOOD 2: NORMAL
D DIMER: 0.66 UG/ML FEU (ref 0–0.6)
D DIMER: 1.35 UG/ML FEU (ref 0–0.6)
EKG ATRIAL RATE: 234 BPM
EKG DIAGNOSIS: NORMAL
EKG P AXIS: 82 DEGREES
EKG Q-T INTERVAL: 432 MS
EKG QRS DURATION: 144 MS
EKG QTC CALCULATION (BAZETT): 482 MS
EKG R AXIS: -53 DEGREES
EKG T AXIS: 33 DEGREES
EKG VENTRICULAR RATE: 75 BPM
EOSINOPHILS ABSOLUTE: 0 K/UL (ref 0–0.6)
EOSINOPHILS RELATIVE PERCENT: 0 %
EPITHELIAL CELLS, UA: 4 /HPF (ref 0–5)
GFR SERPL CREATININE-BSD FRML MDRD: 36 ML/MIN/{1.73_M2}
GFR SERPL CREATININE-BSD FRML MDRD: 36 ML/MIN/{1.73_M2}
GFR SERPL CREATININE-BSD FRML MDRD: 41 ML/MIN/{1.73_M2}
GFR SERPL CREATININE-BSD FRML MDRD: 48 ML/MIN/{1.73_M2}
GFR SERPL CREATININE-BSD FRML MDRD: 48 ML/MIN/{1.73_M2}
GFR SERPL CREATININE-BSD FRML MDRD: 53 ML/MIN/{1.73_M2}
GFR SERPL CREATININE-BSD FRML MDRD: 53 ML/MIN/{1.73_M2}
GFR SERPL CREATININE-BSD FRML MDRD: 58 ML/MIN/{1.73_M2}
GLUCOSE BLD-MCNC: 100 MG/DL (ref 70–99)
GLUCOSE BLD-MCNC: 108 MG/DL (ref 70–99)
GLUCOSE BLD-MCNC: 112 MG/DL (ref 70–99)
GLUCOSE BLD-MCNC: 113 MG/DL (ref 70–99)
GLUCOSE BLD-MCNC: 113 MG/DL (ref 70–99)
GLUCOSE BLD-MCNC: 120 MG/DL (ref 70–99)
GLUCOSE BLD-MCNC: 145 MG/DL (ref 70–99)
GLUCOSE BLD-MCNC: 154 MG/DL (ref 70–99)
GLUCOSE BLD-MCNC: 162 MG/DL (ref 70–99)
GLUCOSE BLD-MCNC: 98 MG/DL (ref 70–99)
GLUCOSE URINE: NEGATIVE MG/DL
HCO3 VENOUS: 15.2 MMOL/L (ref 23–29)
HCT VFR BLD CALC: 34.9 % (ref 40.5–52.5)
HCT VFR BLD CALC: 35.1 % (ref 40.5–52.5)
HCT VFR BLD CALC: 35.1 % (ref 40.5–52.5)
HCT VFR BLD CALC: 37 % (ref 40.5–52.5)
HCT VFR BLD CALC: 38.6 % (ref 40.5–52.5)
HCT VFR BLD CALC: 38.7 % (ref 40.5–52.5)
HCT VFR BLD CALC: 39.5 % (ref 40.5–52.5)
HCT VFR BLD CALC: 40.3 % (ref 40.5–52.5)
HEMATOLOGY PATH CONSULT: NO
HEMATOLOGY PATH CONSULT: YES
HEMOGLOBIN: 11.4 G/DL (ref 13.5–17.5)
HEMOGLOBIN: 11.4 G/DL (ref 13.5–17.5)
HEMOGLOBIN: 11.5 G/DL (ref 13.5–17.5)
HEMOGLOBIN: 11.7 G/DL (ref 13.5–17.5)
HEMOGLOBIN: 12.5 G/DL (ref 13.5–17.5)
HEMOGLOBIN: 12.6 G/DL (ref 13.5–17.5)
HYALINE CASTS: 2 /LPF (ref 0–8)
KETONES, URINE: NEGATIVE MG/DL
LACTIC ACID, SEPSIS: 1.3 MMOL/L (ref 0.4–1.9)
LEUKOCYTE ESTERASE, URINE: ABNORMAL
LYMPHOCYTES ABSOLUTE: 3.1 K/UL (ref 1–5.1)
LYMPHOCYTES ABSOLUTE: 3.4 K/UL (ref 1–5.1)
LYMPHOCYTES ABSOLUTE: 5.4 K/UL (ref 1–5.1)
LYMPHOCYTES ABSOLUTE: 5.5 K/UL (ref 1–5.1)
LYMPHOCYTES ABSOLUTE: 5.9 K/UL (ref 1–5.1)
LYMPHOCYTES ABSOLUTE: 6.1 K/UL (ref 1–5.1)
LYMPHOCYTES ABSOLUTE: 8.1 K/UL (ref 1–5.1)
LYMPHOCYTES ABSOLUTE: 9.5 K/UL (ref 1–5.1)
LYMPHOCYTES RELATIVE PERCENT: 21 %
LYMPHOCYTES RELATIVE PERCENT: 25.8 %
LYMPHOCYTES RELATIVE PERCENT: 29 %
LYMPHOCYTES RELATIVE PERCENT: 31 %
LYMPHOCYTES RELATIVE PERCENT: 32 %
LYMPHOCYTES RELATIVE PERCENT: 34 %
LYMPHOCYTES RELATIVE PERCENT: 34.8 %
LYMPHOCYTES RELATIVE PERCENT: 46 %
MAGNESIUM: 2.5 MG/DL (ref 1.8–2.4)
MAGNESIUM: 2.7 MG/DL (ref 1.8–2.4)
MAGNESIUM: 2.7 MG/DL (ref 1.8–2.4)
MAGNESIUM: 2.8 MG/DL (ref 1.8–2.4)
MAGNESIUM: 2.8 MG/DL (ref 1.8–2.4)
MCH RBC QN AUTO: 26.4 PG (ref 26–34)
MCH RBC QN AUTO: 26.6 PG (ref 26–34)
MCH RBC QN AUTO: 26.9 PG (ref 26–34)
MCH RBC QN AUTO: 27.1 PG (ref 26–34)
MCH RBC QN AUTO: 27.2 PG (ref 26–34)
MCH RBC QN AUTO: 27.3 PG (ref 26–34)
MCHC RBC AUTO-ENTMCNC: 31.1 G/DL (ref 31–36)
MCHC RBC AUTO-ENTMCNC: 31.5 G/DL (ref 31–36)
MCHC RBC AUTO-ENTMCNC: 31.8 G/DL (ref 31–36)
MCHC RBC AUTO-ENTMCNC: 32.3 G/DL (ref 31–36)
MCHC RBC AUTO-ENTMCNC: 32.5 G/DL (ref 31–36)
MCHC RBC AUTO-ENTMCNC: 32.6 G/DL (ref 31–36)
MCHC RBC AUTO-ENTMCNC: 32.7 G/DL (ref 31–36)
MCHC RBC AUTO-ENTMCNC: 32.7 G/DL (ref 31–36)
MCV RBC AUTO: 83 FL (ref 80–100)
MCV RBC AUTO: 83.1 FL (ref 80–100)
MCV RBC AUTO: 83.4 FL (ref 80–100)
MCV RBC AUTO: 83.4 FL (ref 80–100)
MCV RBC AUTO: 83.5 FL (ref 80–100)
MCV RBC AUTO: 83.7 FL (ref 80–100)
MCV RBC AUTO: 83.9 FL (ref 80–100)
MCV RBC AUTO: 87.5 FL (ref 80–100)
METAMYELOCYTES RELATIVE PERCENT: 1 %
METHEMOGLOBIN VENOUS: 0.3 %
MICROSCOPIC EXAMINATION: YES
MONOCYTES ABSOLUTE: 0.5 K/UL (ref 0–1.3)
MONOCYTES ABSOLUTE: 0.5 K/UL (ref 0–1.3)
MONOCYTES ABSOLUTE: 0.6 K/UL (ref 0–1.3)
MONOCYTES ABSOLUTE: 0.7 K/UL (ref 0–1.3)
MONOCYTES ABSOLUTE: 0.7 K/UL (ref 0–1.3)
MONOCYTES ABSOLUTE: 0.8 K/UL (ref 0–1.3)
MONOCYTES ABSOLUTE: 1 K/UL (ref 0–1.3)
MONOCYTES ABSOLUTE: 1.4 K/UL (ref 0–1.3)
MONOCYTES RELATIVE PERCENT: 3 %
MONOCYTES RELATIVE PERCENT: 3.5 %
MONOCYTES RELATIVE PERCENT: 3.8 %
MONOCYTES RELATIVE PERCENT: 4 %
MONOCYTES RELATIVE PERCENT: 5 %
MONOCYTES RELATIVE PERCENT: 8 %
MRSA SCREEN RT-PCR: NORMAL
NEUTROPHILS ABSOLUTE: 10.9 K/UL (ref 1.7–7.7)
NEUTROPHILS ABSOLUTE: 12.4 K/UL (ref 1.7–7.7)
NEUTROPHILS ABSOLUTE: 13.3 K/UL (ref 1.7–7.7)
NEUTROPHILS ABSOLUTE: 6.7 K/UL (ref 1.7–7.7)
NEUTROPHILS ABSOLUTE: 8.8 K/UL (ref 1.7–7.7)
NEUTROPHILS ABSOLUTE: 9.2 K/UL (ref 1.7–7.7)
NEUTROPHILS ABSOLUTE: 9.5 K/UL (ref 1.7–7.7)
NEUTROPHILS ABSOLUTE: 9.8 K/UL (ref 1.7–7.7)
NEUTROPHILS RELATIVE PERCENT: 38 %
NEUTROPHILS RELATIVE PERCENT: 50 %
NEUTROPHILS RELATIVE PERCENT: 60.9 %
NEUTROPHILS RELATIVE PERCENT: 61 %
NEUTROPHILS RELATIVE PERCENT: 64 %
NEUTROPHILS RELATIVE PERCENT: 66 %
NEUTROPHILS RELATIVE PERCENT: 69.5 %
NEUTROPHILS RELATIVE PERCENT: 72 %
NITRITE, URINE: NEGATIVE
NUCLEATED RED BLOOD CELLS: 1 /100 WBC
O2 CONTENT, VEN: 17 VOL %
O2 SAT, VEN: 100 %
O2 THERAPY: ABNORMAL
OVALOCYTES: ABNORMAL
PCO2, VEN: 24.7 MMHG (ref 40–50)
PDW BLD-RTO: 17.3 % (ref 12.4–15.4)
PDW BLD-RTO: 17.5 % (ref 12.4–15.4)
PDW BLD-RTO: 17.6 % (ref 12.4–15.4)
PDW BLD-RTO: 17.9 % (ref 12.4–15.4)
PDW BLD-RTO: 18.1 % (ref 12.4–15.4)
PDW BLD-RTO: 18.3 % (ref 12.4–15.4)
PH UA: 6 (ref 5–8)
PH VENOUS: 7.4 (ref 7.35–7.45)
PHOSPHORUS: 1.3 MG/DL (ref 2.5–4.9)
PHOSPHORUS: 2.3 MG/DL (ref 2.5–4.9)
PHOSPHORUS: 2.6 MG/DL (ref 2.5–4.9)
PHOSPHORUS: 2.7 MG/DL (ref 2.5–4.9)
PHOSPHORUS: 2.9 MG/DL (ref 2.5–4.9)
PHOSPHORUS: 2.9 MG/DL (ref 2.5–4.9)
PLATELET # BLD: 150 K/UL (ref 135–450)
PLATELET # BLD: 160 K/UL (ref 135–450)
PLATELET # BLD: 165 K/UL (ref 135–450)
PLATELET # BLD: 180 K/UL (ref 135–450)
PLATELET # BLD: 197 K/UL (ref 135–450)
PLATELET # BLD: 206 K/UL (ref 135–450)
PLATELET # BLD: 210 K/UL (ref 135–450)
PLATELET # BLD: 228 K/UL (ref 135–450)
PLATELET SLIDE REVIEW: ADEQUATE
PMV BLD AUTO: 8.1 FL (ref 5–10.5)
PMV BLD AUTO: 8.2 FL (ref 5–10.5)
PMV BLD AUTO: 8.4 FL (ref 5–10.5)
PMV BLD AUTO: 8.7 FL (ref 5–10.5)
PMV BLD AUTO: 8.8 FL (ref 5–10.5)
PMV BLD AUTO: 8.9 FL (ref 5–10.5)
PMV BLD AUTO: 9.1 FL (ref 5–10.5)
PMV BLD AUTO: 9.1 FL (ref 5–10.5)
PO2, VEN: 182 MMHG (ref 25–40)
POTASSIUM REFLEX MAGNESIUM: 4.8 MMOL/L (ref 3.5–5.1)
POTASSIUM SERPL-SCNC: 4.2 MMOL/L (ref 3.5–5.1)
POTASSIUM SERPL-SCNC: 4.3 MMOL/L (ref 3.5–5.1)
POTASSIUM SERPL-SCNC: 4.7 MMOL/L (ref 3.5–5.1)
POTASSIUM SERPL-SCNC: 4.8 MMOL/L (ref 3.5–5.1)
POTASSIUM SERPL-SCNC: 5 MMOL/L (ref 3.5–5.1)
POTASSIUM SERPL-SCNC: 5.2 MMOL/L (ref 3.5–5.1)
POTASSIUM, UR: 41.8 MMOL/L
PRO-BNP: 607 PG/ML (ref 0–449)
PROCALCITONIN: 0.14 NG/ML (ref 0–0.15)
PROCALCITONIN: 0.16 NG/ML (ref 0–0.15)
PROCALCITONIN: 0.21 NG/ML (ref 0–0.15)
PROTEIN UA: 100 MG/DL
RAPID INFLUENZA  B AGN: NEGATIVE
RAPID INFLUENZA A AGN: NEGATIVE
RBC # BLD: 4.2 M/UL (ref 4.2–5.9)
RBC # BLD: 4.21 M/UL (ref 4.2–5.9)
RBC # BLD: 4.23 M/UL (ref 4.2–5.9)
RBC # BLD: 4.42 M/UL (ref 4.2–5.9)
RBC # BLD: 4.61 M/UL (ref 4.2–5.9)
RBC # BLD: 4.63 M/UL (ref 4.2–5.9)
RBC # BLD: 4.63 M/UL (ref 4.2–5.9)
RBC # BLD: 4.71 M/UL (ref 4.2–5.9)
RBC # BLD: NORMAL 10*6/UL
RBC UA: ABNORMAL /HPF (ref 0–4)
REASON FOR REJECTION: NORMAL
REASON FOR REJECTION: NORMAL
REJECTED TEST: NORMAL
REJECTED TEST: NORMAL
SARS-COV-2: DETECTED
SLIDE REVIEW: ABNORMAL
SMUDGE CELLS: PRESENT
SODIUM BLD-SCNC: 130 MMOL/L (ref 136–145)
SODIUM BLD-SCNC: 136 MMOL/L (ref 136–145)
SODIUM BLD-SCNC: 138 MMOL/L (ref 136–145)
SODIUM BLD-SCNC: 139 MMOL/L (ref 136–145)
SODIUM BLD-SCNC: 140 MMOL/L (ref 136–145)
SODIUM BLD-SCNC: 141 MMOL/L (ref 136–145)
SODIUM BLD-SCNC: 141 MMOL/L (ref 136–145)
SODIUM BLD-SCNC: 142 MMOL/L (ref 136–145)
SODIUM URINE: <20 MMOL/L
SPECIFIC GRAVITY UA: 1.02 (ref 1–1.03)
TCO2 CALC VENOUS: 36 MMOL/L
TEAR DROP CELLS: ABNORMAL
TOTAL PROTEIN: 5.2 G/DL (ref 6.4–8.2)
TOTAL PROTEIN: 5.4 G/DL (ref 6.4–8.2)
TOTAL PROTEIN: 5.7 G/DL (ref 6.4–8.2)
TOTAL PROTEIN: 6 G/DL (ref 6.4–8.2)
TOTAL PROTEIN: 6.1 G/DL (ref 6.4–8.2)
TOTAL PROTEIN: 6.9 G/DL (ref 6.4–8.2)
TROPONIN: 0.01 NG/ML
UREA NITROGEN, UR: 1012.1 MG/DL (ref 800–1666)
URIC ACID, SERUM: 7.9 MG/DL (ref 3.5–7.2)
URIC ACID, SERUM: 9.3 MG/DL (ref 3.5–7.2)
URINE TYPE: ABNORMAL
UROBILINOGEN, URINE: 1 E.U./DL
VACUOLATED NEUTROPHILS: PRESENT
VANCOMYCIN RANDOM: 13.8 UG/ML
WBC # BLD: 13.2 K/UL (ref 4–11)
WBC # BLD: 14.7 K/UL (ref 4–11)
WBC # BLD: 15.6 K/UL (ref 4–11)
WBC # BLD: 16.1 K/UL (ref 4–11)
WBC # BLD: 17.5 K/UL (ref 4–11)
WBC # BLD: 17.5 K/UL (ref 4–11)
WBC # BLD: 19 K/UL (ref 4–11)
WBC # BLD: 20.2 K/UL (ref 4–11)
WBC UA: 1 /HPF (ref 0–5)

## 2022-01-01 PROCEDURE — 6370000000 HC RX 637 (ALT 250 FOR IP): Performed by: HOSPITALIST

## 2022-01-01 PROCEDURE — 6360000002 HC RX W HCPCS: Performed by: INTERNAL MEDICINE

## 2022-01-01 PROCEDURE — 2580000003 HC RX 258: Performed by: INTERNAL MEDICINE

## 2022-01-01 PROCEDURE — 85379 FIBRIN DEGRADATION QUANT: CPT

## 2022-01-01 PROCEDURE — 2580000003 HC RX 258: Performed by: HOSPITALIST

## 2022-01-01 PROCEDURE — 6360000002 HC RX W HCPCS: Performed by: HOSPITALIST

## 2022-01-01 PROCEDURE — 83605 ASSAY OF LACTIC ACID: CPT

## 2022-01-01 PROCEDURE — 94640 AIRWAY INHALATION TREATMENT: CPT

## 2022-01-01 PROCEDURE — 85025 COMPLETE CBC W/AUTO DIFF WBC: CPT

## 2022-01-01 PROCEDURE — 99223 1ST HOSP IP/OBS HIGH 75: CPT | Performed by: INTERNAL MEDICINE

## 2022-01-01 PROCEDURE — 2000000000 HC ICU R&B

## 2022-01-01 PROCEDURE — 1200000000 HC SEMI PRIVATE

## 2022-01-01 PROCEDURE — 84145 PROCALCITONIN (PCT): CPT

## 2022-01-01 PROCEDURE — 99291 CRITICAL CARE FIRST HOUR: CPT | Performed by: INTERNAL MEDICINE

## 2022-01-01 PROCEDURE — 94761 N-INVAS EAR/PLS OXIMETRY MLT: CPT

## 2022-01-01 PROCEDURE — 84100 ASSAY OF PHOSPHORUS: CPT

## 2022-01-01 PROCEDURE — 87040 BLOOD CULTURE FOR BACTERIA: CPT

## 2022-01-01 PROCEDURE — 83880 ASSAY OF NATRIURETIC PEPTIDE: CPT

## 2022-01-01 PROCEDURE — 93010 ELECTROCARDIOGRAM REPORT: CPT | Performed by: INTERNAL MEDICINE

## 2022-01-01 PROCEDURE — 99232 SBSQ HOSP IP/OBS MODERATE 35: CPT | Performed by: INTERNAL MEDICINE

## 2022-01-01 PROCEDURE — 86140 C-REACTIVE PROTEIN: CPT

## 2022-01-01 PROCEDURE — 93005 ELECTROCARDIOGRAM TRACING: CPT | Performed by: EMERGENCY MEDICINE

## 2022-01-01 PROCEDURE — 2700000000 HC OXYGEN THERAPY PER DAY

## 2022-01-01 PROCEDURE — U0003 INFECTIOUS AGENT DETECTION BY NUCLEIC ACID (DNA OR RNA); SEVERE ACUTE RESPIRATORY SYNDROME CORONAVIRUS 2 (SARS-COV-2) (CORONAVIRUS DISEASE [COVID-19]), AMPLIFIED PROBE TECHNIQUE, MAKING USE OF HIGH THROUGHPUT TECHNOLOGIES AS DESCRIBED BY CMS-2020-01-R: HCPCS

## 2022-01-01 PROCEDURE — 84550 ASSAY OF BLOOD/URIC ACID: CPT

## 2022-01-01 PROCEDURE — 36415 COLL VENOUS BLD VENIPUNCTURE: CPT

## 2022-01-01 PROCEDURE — 84484 ASSAY OF TROPONIN QUANT: CPT

## 2022-01-01 PROCEDURE — 83735 ASSAY OF MAGNESIUM: CPT

## 2022-01-01 PROCEDURE — 6360000002 HC RX W HCPCS: Performed by: EMERGENCY MEDICINE

## 2022-01-01 PROCEDURE — 80069 RENAL FUNCTION PANEL: CPT

## 2022-01-01 PROCEDURE — 2580000003 HC RX 258: Performed by: EMERGENCY MEDICINE

## 2022-01-01 PROCEDURE — 71045 X-RAY EXAM CHEST 1 VIEW: CPT

## 2022-01-01 PROCEDURE — 2060000000 HC ICU INTERMEDIATE R&B

## 2022-01-01 PROCEDURE — 99497 ADVNCD CARE PLAN 30 MIN: CPT | Performed by: INTERNAL MEDICINE

## 2022-01-01 PROCEDURE — 80053 COMPREHEN METABOLIC PANEL: CPT

## 2022-01-01 PROCEDURE — 99233 SBSQ HOSP IP/OBS HIGH 50: CPT | Performed by: INTERNAL MEDICINE

## 2022-01-01 PROCEDURE — 80048 BASIC METABOLIC PNL TOTAL CA: CPT

## 2022-01-01 PROCEDURE — 99222 1ST HOSP IP/OBS MODERATE 55: CPT | Performed by: NURSE PRACTITIONER

## 2022-01-01 PROCEDURE — 82570 ASSAY OF URINE CREATININE: CPT

## 2022-01-01 PROCEDURE — 2500000003 HC RX 250 WO HCPCS: Performed by: INTERNAL MEDICINE

## 2022-01-01 PROCEDURE — 99285 EMERGENCY DEPT VISIT HI MDM: CPT

## 2022-01-01 PROCEDURE — 94760 N-INVAS EAR/PLS OXIMETRY 1: CPT

## 2022-01-01 PROCEDURE — 87641 MR-STAPH DNA AMP PROBE: CPT

## 2022-01-01 PROCEDURE — 80202 ASSAY OF VANCOMYCIN: CPT

## 2022-01-01 PROCEDURE — 87804 INFLUENZA ASSAY W/OPTIC: CPT

## 2022-01-01 PROCEDURE — 71250 CT THORAX DX C-: CPT

## 2022-01-01 PROCEDURE — 84300 ASSAY OF URINE SODIUM: CPT

## 2022-01-01 PROCEDURE — 94150 VITAL CAPACITY TEST: CPT

## 2022-01-01 PROCEDURE — 82436 ASSAY OF URINE CHLORIDE: CPT

## 2022-01-01 PROCEDURE — 94669 MECHANICAL CHEST WALL OSCILL: CPT

## 2022-01-01 PROCEDURE — 6370000000 HC RX 637 (ALT 250 FOR IP): Performed by: EMERGENCY MEDICINE

## 2022-01-01 PROCEDURE — 84133 ASSAY OF URINE POTASSIUM: CPT

## 2022-01-01 PROCEDURE — 81001 URINALYSIS AUTO W/SCOPE: CPT

## 2022-01-01 PROCEDURE — 82803 BLOOD GASES ANY COMBINATION: CPT

## 2022-01-01 PROCEDURE — 84540 ASSAY OF URINE/UREA-N: CPT

## 2022-01-01 PROCEDURE — 6370000000 HC RX 637 (ALT 250 FOR IP): Performed by: INTERNAL MEDICINE

## 2022-01-01 PROCEDURE — U0005 INFEC AGEN DETEC AMPLI PROBE: HCPCS

## 2022-01-01 RX ORDER — MIDAZOLAM HYDROCHLORIDE 2 MG/2ML
2 INJECTION, SOLUTION INTRAMUSCULAR; INTRAVENOUS
Status: DISCONTINUED | OUTPATIENT
Start: 2022-01-01 | End: 2022-01-01 | Stop reason: HOSPADM

## 2022-01-01 RX ORDER — SCOLOPAMINE TRANSDERMAL SYSTEM 1 MG/1
1 PATCH, EXTENDED RELEASE TRANSDERMAL
Status: DISCONTINUED | OUTPATIENT
Start: 2022-01-01 | End: 2022-01-01 | Stop reason: HOSPADM

## 2022-01-01 RX ORDER — POLYETHYLENE GLYCOL 3350 17 G/17G
17 POWDER, FOR SOLUTION ORAL DAILY PRN
Status: DISCONTINUED | OUTPATIENT
Start: 2022-01-01 | End: 2022-01-01 | Stop reason: HOSPADM

## 2022-01-01 RX ORDER — PROPRANOLOL HYDROCHLORIDE 20 MG/1
20 TABLET ORAL DAILY
Status: DISCONTINUED | OUTPATIENT
Start: 2022-01-01 | End: 2022-01-01 | Stop reason: HOSPADM

## 2022-01-01 RX ORDER — PROPRANOLOL HYDROCHLORIDE 20 MG/1
20 TABLET ORAL DAILY
Qty: 90 TABLET | Refills: 3 | Status: SHIPPED | OUTPATIENT
Start: 2022-01-01

## 2022-01-01 RX ORDER — DONEPEZIL HYDROCHLORIDE 5 MG/1
10 TABLET, FILM COATED ORAL NIGHTLY
Status: DISCONTINUED | OUTPATIENT
Start: 2022-01-01 | End: 2022-01-01 | Stop reason: HOSPADM

## 2022-01-01 RX ORDER — ACETAMINOPHEN 325 MG/1
650 TABLET ORAL EVERY 6 HOURS PRN
Status: DISCONTINUED | OUTPATIENT
Start: 2022-01-01 | End: 2022-01-01 | Stop reason: HOSPADM

## 2022-01-01 RX ORDER — MORPHINE SULFATE 2 MG/ML
1 INJECTION, SOLUTION INTRAMUSCULAR; INTRAVENOUS EVERY 4 HOURS PRN
Status: DISCONTINUED | OUTPATIENT
Start: 2022-01-01 | End: 2022-01-01

## 2022-01-01 RX ORDER — PRIMIDONE 50 MG/1
50 TABLET ORAL DAILY
Status: DISCONTINUED | OUTPATIENT
Start: 2022-01-01 | End: 2022-01-01 | Stop reason: HOSPADM

## 2022-01-01 RX ORDER — SODIUM CHLORIDE 0.9 % (FLUSH) 0.9 %
5-40 SYRINGE (ML) INJECTION PRN
Status: DISCONTINUED | OUTPATIENT
Start: 2022-01-01 | End: 2022-01-01 | Stop reason: HOSPADM

## 2022-01-01 RX ORDER — MORPHINE SULFATE 2 MG/ML
1 INJECTION, SOLUTION INTRAMUSCULAR; INTRAVENOUS ONCE
Status: DISCONTINUED | OUTPATIENT
Start: 2022-01-01 | End: 2022-01-01

## 2022-01-01 RX ORDER — ONDANSETRON 2 MG/ML
4 INJECTION INTRAMUSCULAR; INTRAVENOUS EVERY 6 HOURS PRN
Status: DISCONTINUED | OUTPATIENT
Start: 2022-01-01 | End: 2022-01-01 | Stop reason: HOSPADM

## 2022-01-01 RX ORDER — IPRATROPIUM BROMIDE AND ALBUTEROL SULFATE 2.5; .5 MG/3ML; MG/3ML
1 SOLUTION RESPIRATORY (INHALATION)
Status: DISCONTINUED | OUTPATIENT
Start: 2022-01-01 | End: 2022-01-01

## 2022-01-01 RX ORDER — MORPHINE SULFATE 4 MG/ML
4 INJECTION, SOLUTION INTRAMUSCULAR; INTRAVENOUS EVERY 4 HOURS PRN
Status: DISCONTINUED | OUTPATIENT
Start: 2022-01-01 | End: 2022-01-01

## 2022-01-01 RX ORDER — SODIUM CHLORIDE 9 MG/ML
INJECTION, SOLUTION INTRAVENOUS CONTINUOUS
Status: DISCONTINUED | OUTPATIENT
Start: 2022-01-01 | End: 2022-01-01

## 2022-01-01 RX ORDER — ENOXAPARIN SODIUM 100 MG/ML
1 INJECTION SUBCUTANEOUS 2 TIMES DAILY
Status: DISCONTINUED | OUTPATIENT
Start: 2022-01-01 | End: 2022-01-01 | Stop reason: HOSPADM

## 2022-01-01 RX ORDER — AMLODIPINE BESYLATE 5 MG/1
10 TABLET ORAL DAILY
Status: DISCONTINUED | OUTPATIENT
Start: 2022-01-01 | End: 2022-01-01

## 2022-01-01 RX ORDER — LEVOTHYROXINE SODIUM 0.1 MG/1
100 TABLET ORAL DAILY
Status: DISCONTINUED | OUTPATIENT
Start: 2022-01-01 | End: 2022-01-01 | Stop reason: HOSPADM

## 2022-01-01 RX ORDER — ACETAMINOPHEN 650 MG/1
650 SUPPOSITORY RECTAL EVERY 6 HOURS PRN
Status: DISCONTINUED | OUTPATIENT
Start: 2022-01-01 | End: 2022-01-01 | Stop reason: HOSPADM

## 2022-01-01 RX ORDER — FUROSEMIDE 10 MG/ML
20 INJECTION INTRAMUSCULAR; INTRAVENOUS DAILY
Status: DISCONTINUED | OUTPATIENT
Start: 2022-01-01 | End: 2022-01-01

## 2022-01-01 RX ORDER — IPRATROPIUM BROMIDE AND ALBUTEROL SULFATE 2.5; .5 MG/3ML; MG/3ML
1 SOLUTION RESPIRATORY (INHALATION) ONCE
Status: COMPLETED | OUTPATIENT
Start: 2022-01-01 | End: 2022-01-01

## 2022-01-01 RX ORDER — FUROSEMIDE 10 MG/ML
20 INJECTION INTRAMUSCULAR; INTRAVENOUS 2 TIMES DAILY
Status: DISCONTINUED | OUTPATIENT
Start: 2022-01-01 | End: 2022-01-01

## 2022-01-01 RX ORDER — ALBUTEROL SULFATE 90 UG/1
2 AEROSOL, METERED RESPIRATORY (INHALATION) 4 TIMES DAILY
Status: DISCONTINUED | OUTPATIENT
Start: 2022-01-01 | End: 2022-01-01

## 2022-01-01 RX ORDER — DEXAMETHASONE SODIUM PHOSPHATE 10 MG/ML
10 INJECTION, SOLUTION INTRAMUSCULAR; INTRAVENOUS EVERY 24 HOURS
Status: DISCONTINUED | OUTPATIENT
Start: 2022-01-01 | End: 2022-01-01

## 2022-01-01 RX ORDER — MORPHINE SULFATE 2 MG/ML
2 INJECTION, SOLUTION INTRAMUSCULAR; INTRAVENOUS
Status: DISCONTINUED | OUTPATIENT
Start: 2022-01-01 | End: 2022-01-01 | Stop reason: HOSPADM

## 2022-01-01 RX ORDER — SODIUM CHLORIDE 9 MG/ML
INJECTION, SOLUTION INTRAVENOUS PRN
Status: DISCONTINUED | OUTPATIENT
Start: 2022-01-01 | End: 2022-01-01 | Stop reason: HOSPADM

## 2022-01-01 RX ORDER — ALLOPURINOL 100 MG/1
100 TABLET ORAL DAILY
Status: DISCONTINUED | OUTPATIENT
Start: 2022-01-01 | End: 2022-01-01 | Stop reason: HOSPADM

## 2022-01-01 RX ORDER — AMLODIPINE BESYLATE 10 MG/1
10 TABLET ORAL DAILY
Qty: 90 TABLET | Refills: 3 | Status: SHIPPED | OUTPATIENT
Start: 2022-01-01

## 2022-01-01 RX ORDER — SODIUM CHLORIDE 0.9 % (FLUSH) 0.9 %
5-40 SYRINGE (ML) INJECTION EVERY 12 HOURS SCHEDULED
Status: DISCONTINUED | OUTPATIENT
Start: 2022-01-01 | End: 2022-01-01 | Stop reason: HOSPADM

## 2022-01-01 RX ORDER — ALBUTEROL SULFATE 90 UG/1
2 AEROSOL, METERED RESPIRATORY (INHALATION) 2 TIMES DAILY
Status: DISCONTINUED | OUTPATIENT
Start: 2022-01-01 | End: 2022-01-01

## 2022-01-01 RX ORDER — ONDANSETRON 4 MG/1
4 TABLET, ORALLY DISINTEGRATING ORAL EVERY 8 HOURS PRN
Status: DISCONTINUED | OUTPATIENT
Start: 2022-01-01 | End: 2022-01-01 | Stop reason: HOSPADM

## 2022-01-01 RX ORDER — MORPHINE SULFATE 2 MG/ML
2 INJECTION, SOLUTION INTRAMUSCULAR; INTRAVENOUS EVERY 4 HOURS PRN
Status: DISCONTINUED | OUTPATIENT
Start: 2022-01-01 | End: 2022-01-01

## 2022-01-01 RX ORDER — TORSEMIDE 20 MG/1
20 TABLET ORAL DAILY
Qty: 30 TABLET | Refills: 5 | Status: SHIPPED | OUTPATIENT
Start: 2022-01-01

## 2022-01-01 RX ORDER — DEXAMETHASONE 4 MG/1
6 TABLET ORAL DAILY
Status: DISCONTINUED | OUTPATIENT
Start: 2022-01-01 | End: 2022-01-01

## 2022-01-01 RX ORDER — ENOXAPARIN SODIUM 100 MG/ML
30 INJECTION SUBCUTANEOUS 2 TIMES DAILY
Status: DISCONTINUED | OUTPATIENT
Start: 2022-01-01 | End: 2022-01-01

## 2022-01-01 RX ORDER — SODIUM CHLORIDE, SODIUM LACTATE, POTASSIUM CHLORIDE, CALCIUM CHLORIDE 600; 310; 30; 20 MG/100ML; MG/100ML; MG/100ML; MG/100ML
500 INJECTION, SOLUTION INTRAVENOUS ONCE
Status: COMPLETED | OUTPATIENT
Start: 2022-01-01 | End: 2022-01-01

## 2022-01-01 RX ORDER — MORPHINE SULFATE 2 MG/ML
2 INJECTION, SOLUTION INTRAMUSCULAR; INTRAVENOUS ONCE
Status: COMPLETED | OUTPATIENT
Start: 2022-01-01 | End: 2022-01-01

## 2022-01-01 RX ADMIN — PROPRANOLOL HYDROCHLORIDE 20 MG: 20 TABLET ORAL at 10:03

## 2022-01-01 RX ADMIN — PRIMIDONE 50 MG: 50 TABLET ORAL at 10:03

## 2022-01-01 RX ADMIN — Medication 2 PUFF: at 21:05

## 2022-01-01 RX ADMIN — Medication 2 PUFF: at 19:50

## 2022-01-01 RX ADMIN — ENOXAPARIN SODIUM 100 MG: 100 INJECTION SUBCUTANEOUS at 20:56

## 2022-01-01 RX ADMIN — PROPRANOLOL HYDROCHLORIDE 20 MG: 20 TABLET ORAL at 10:25

## 2022-01-01 RX ADMIN — Medication 10 ML: at 08:59

## 2022-01-01 RX ADMIN — Medication 2 PUFF: at 20:50

## 2022-01-01 RX ADMIN — LEVOTHYROXINE SODIUM 100 MCG: 0.1 TABLET ORAL at 05:47

## 2022-01-01 RX ADMIN — Medication 10 ML: at 10:11

## 2022-01-01 RX ADMIN — ACETAMINOPHEN 650 MG: 325 TABLET ORAL at 12:04

## 2022-01-01 RX ADMIN — ALLOPURINOL 100 MG: 100 TABLET ORAL at 09:53

## 2022-01-01 RX ADMIN — Medication 2 PUFF: at 08:53

## 2022-01-01 RX ADMIN — ENOXAPARIN SODIUM 30 MG: 100 INJECTION SUBCUTANEOUS at 09:17

## 2022-01-01 RX ADMIN — CEFEPIME 2000 MG: 2 INJECTION, POWDER, FOR SOLUTION INTRAVENOUS at 06:46

## 2022-01-01 RX ADMIN — Medication 2 PUFF: at 09:11

## 2022-01-01 RX ADMIN — ALLOPURINOL 100 MG: 100 TABLET ORAL at 09:18

## 2022-01-01 RX ADMIN — DEXAMETHASONE SODIUM PHOSPHATE 20 MG: 4 INJECTION, SOLUTION INTRA-ARTICULAR; INTRALESIONAL; INTRAMUSCULAR; INTRAVENOUS; SOFT TISSUE at 11:46

## 2022-01-01 RX ADMIN — Medication 2 PUFF: at 09:29

## 2022-01-01 RX ADMIN — Medication 2 PUFF: at 21:02

## 2022-01-01 RX ADMIN — Medication 10 ML: at 21:12

## 2022-01-01 RX ADMIN — LEVOTHYROXINE SODIUM 100 MCG: 0.1 TABLET ORAL at 06:47

## 2022-01-01 RX ADMIN — ENOXAPARIN SODIUM 30 MG: 100 INJECTION SUBCUTANEOUS at 20:47

## 2022-01-01 RX ADMIN — DEXAMETHASONE SODIUM PHOSPHATE 20 MG: 4 INJECTION, SOLUTION INTRA-ARTICULAR; INTRALESIONAL; INTRAMUSCULAR; INTRAVENOUS; SOFT TISSUE at 12:17

## 2022-01-01 RX ADMIN — Medication 2 PUFF: at 08:59

## 2022-01-01 RX ADMIN — DONEPEZIL HYDROCHLORIDE 10 MG: 5 TABLET, FILM COATED ORAL at 19:38

## 2022-01-01 RX ADMIN — Medication 2 PUFF: at 12:58

## 2022-01-01 RX ADMIN — DEXAMETHASONE 6 MG: 4 TABLET ORAL at 12:41

## 2022-01-01 RX ADMIN — CEFEPIME 2000 MG: 2 INJECTION, POWDER, FOR SOLUTION INTRAVENOUS at 04:55

## 2022-01-01 RX ADMIN — LEVOTHYROXINE SODIUM 100 MCG: 0.1 TABLET ORAL at 06:15

## 2022-01-01 RX ADMIN — PRIMIDONE 50 MG: 50 TABLET ORAL at 09:45

## 2022-01-01 RX ADMIN — Medication 10 ML: at 20:40

## 2022-01-01 RX ADMIN — ENOXAPARIN SODIUM 100 MG: 100 INJECTION SUBCUTANEOUS at 09:10

## 2022-01-01 RX ADMIN — ACETAMINOPHEN 650 MG: 325 TABLET ORAL at 01:46

## 2022-01-01 RX ADMIN — DONEPEZIL HYDROCHLORIDE 10 MG: 5 TABLET, FILM COATED ORAL at 20:36

## 2022-01-01 RX ADMIN — ENOXAPARIN SODIUM 100 MG: 100 INJECTION SUBCUTANEOUS at 20:29

## 2022-01-01 RX ADMIN — MIDAZOLAM HYDROCHLORIDE 2 MG: 1 INJECTION, SOLUTION INTRAMUSCULAR; INTRAVENOUS at 21:49

## 2022-01-01 RX ADMIN — ENOXAPARIN SODIUM 100 MG: 100 INJECTION SUBCUTANEOUS at 09:53

## 2022-01-01 RX ADMIN — Medication 2 PUFF: at 09:12

## 2022-01-01 RX ADMIN — CEFEPIME 2000 MG: 2 INJECTION, POWDER, FOR SOLUTION INTRAVENOUS at 17:40

## 2022-01-01 RX ADMIN — Medication 2 PUFF: at 08:51

## 2022-01-01 RX ADMIN — MIDAZOLAM HYDROCHLORIDE 2 MG: 1 INJECTION, SOLUTION INTRAMUSCULAR; INTRAVENOUS at 02:28

## 2022-01-01 RX ADMIN — DONEPEZIL HYDROCHLORIDE 10 MG: 5 TABLET, FILM COATED ORAL at 21:40

## 2022-01-01 RX ADMIN — PROPRANOLOL HYDROCHLORIDE 20 MG: 20 TABLET ORAL at 08:59

## 2022-01-01 RX ADMIN — Medication 2 PUFF: at 21:53

## 2022-01-01 RX ADMIN — ENOXAPARIN SODIUM 30 MG: 100 INJECTION SUBCUTANEOUS at 21:40

## 2022-01-01 RX ADMIN — VANCOMYCIN HYDROCHLORIDE 750 MG: 750 INJECTION, POWDER, LYOPHILIZED, FOR SOLUTION INTRAVENOUS at 19:47

## 2022-01-01 RX ADMIN — IPRATROPIUM BROMIDE AND ALBUTEROL SULFATE 1 AMPULE: .5; 3 SOLUTION RESPIRATORY (INHALATION) at 16:15

## 2022-01-01 RX ADMIN — SODIUM CHLORIDE, POTASSIUM CHLORIDE, SODIUM LACTATE AND CALCIUM CHLORIDE 500 ML: 600; 310; 30; 20 INJECTION, SOLUTION INTRAVENOUS at 17:34

## 2022-01-01 RX ADMIN — DEXAMETHASONE SODIUM PHOSPHATE 10 MG: 10 INJECTION, SOLUTION INTRAMUSCULAR; INTRAVENOUS at 10:25

## 2022-01-01 RX ADMIN — PROPRANOLOL HYDROCHLORIDE 20 MG: 20 TABLET ORAL at 09:53

## 2022-01-01 RX ADMIN — FUROSEMIDE 20 MG: 10 INJECTION, SOLUTION INTRAMUSCULAR; INTRAVENOUS at 18:33

## 2022-01-01 RX ADMIN — MORPHINE SULFATE 2 MG: 2 INJECTION, SOLUTION INTRAMUSCULAR; INTRAVENOUS at 09:54

## 2022-01-01 RX ADMIN — ENOXAPARIN SODIUM 30 MG: 100 INJECTION SUBCUTANEOUS at 08:59

## 2022-01-01 RX ADMIN — PRIMIDONE 50 MG: 50 TABLET ORAL at 09:31

## 2022-01-01 RX ADMIN — Medication 10 ML: at 20:42

## 2022-01-01 RX ADMIN — DONEPEZIL HYDROCHLORIDE 10 MG: 5 TABLET, FILM COATED ORAL at 20:29

## 2022-01-01 RX ADMIN — CEFEPIME 2000 MG: 2 INJECTION, POWDER, FOR SOLUTION INTRAVENOUS at 19:05

## 2022-01-01 RX ADMIN — ENOXAPARIN SODIUM 100 MG: 100 INJECTION SUBCUTANEOUS at 10:14

## 2022-01-01 RX ADMIN — ACETAMINOPHEN 650 MG: 325 TABLET ORAL at 21:48

## 2022-01-01 RX ADMIN — Medication 2 PUFF: at 13:17

## 2022-01-01 RX ADMIN — ALLOPURINOL 100 MG: 100 TABLET ORAL at 08:59

## 2022-01-01 RX ADMIN — Medication 2 PUFF: at 17:32

## 2022-01-01 RX ADMIN — ACETAMINOPHEN 650 MG: 325 TABLET ORAL at 10:32

## 2022-01-01 RX ADMIN — Medication 10 ML: at 09:45

## 2022-01-01 RX ADMIN — LEVOTHYROXINE SODIUM 100 MCG: 0.1 TABLET ORAL at 06:42

## 2022-01-01 RX ADMIN — SODIUM BICARBONATE: 84 INJECTION, SOLUTION INTRAVENOUS at 12:36

## 2022-01-01 RX ADMIN — MIDAZOLAM HYDROCHLORIDE 2 MG: 1 INJECTION, SOLUTION INTRAMUSCULAR; INTRAVENOUS at 16:55

## 2022-01-01 RX ADMIN — ENOXAPARIN SODIUM 100 MG: 100 INJECTION SUBCUTANEOUS at 09:44

## 2022-01-01 RX ADMIN — ALLOPURINOL 100 MG: 100 TABLET ORAL at 10:25

## 2022-01-01 RX ADMIN — DONEPEZIL HYDROCHLORIDE 10 MG: 5 TABLET, FILM COATED ORAL at 20:47

## 2022-01-01 RX ADMIN — DONEPEZIL HYDROCHLORIDE 10 MG: 5 TABLET, FILM COATED ORAL at 21:03

## 2022-01-01 RX ADMIN — LEVOTHYROXINE SODIUM 100 MCG: 0.1 TABLET ORAL at 06:30

## 2022-01-01 RX ADMIN — ENOXAPARIN SODIUM 100 MG: 100 INJECTION SUBCUTANEOUS at 20:36

## 2022-01-01 RX ADMIN — ALLOPURINOL 100 MG: 100 TABLET ORAL at 09:09

## 2022-01-01 RX ADMIN — ALLOPURINOL 100 MG: 100 TABLET ORAL at 09:44

## 2022-01-01 RX ADMIN — MIDAZOLAM HYDROCHLORIDE 2 MG: 1 INJECTION, SOLUTION INTRAMUSCULAR; INTRAVENOUS at 00:22

## 2022-01-01 RX ADMIN — ENOXAPARIN SODIUM 30 MG: 100 INJECTION SUBCUTANEOUS at 19:38

## 2022-01-01 RX ADMIN — MIDAZOLAM HYDROCHLORIDE 2 MG: 1 INJECTION, SOLUTION INTRAMUSCULAR; INTRAVENOUS at 19:24

## 2022-01-01 RX ADMIN — VANCOMYCIN HYDROCHLORIDE 1500 MG: 1 INJECTION, POWDER, LYOPHILIZED, FOR SOLUTION INTRAVENOUS at 18:55

## 2022-01-01 RX ADMIN — Medication 10 ML: at 10:09

## 2022-01-01 RX ADMIN — DONEPEZIL HYDROCHLORIDE 10 MG: 5 TABLET, FILM COATED ORAL at 20:37

## 2022-01-01 RX ADMIN — Medication 10 ML: at 10:25

## 2022-01-01 RX ADMIN — MORPHINE SULFATE 2 MG: 2 INJECTION, SOLUTION INTRAMUSCULAR; INTRAVENOUS at 01:26

## 2022-01-01 RX ADMIN — SODIUM CHLORIDE: 9 INJECTION, SOLUTION INTRAVENOUS at 17:39

## 2022-01-01 RX ADMIN — ENOXAPARIN SODIUM 100 MG: 100 INJECTION SUBCUTANEOUS at 10:03

## 2022-01-01 RX ADMIN — DEXAMETHASONE SODIUM PHOSPHATE 20 MG: 4 INJECTION, SOLUTION INTRA-ARTICULAR; INTRALESIONAL; INTRAMUSCULAR; INTRAVENOUS; SOFT TISSUE at 11:00

## 2022-01-01 RX ADMIN — PROPRANOLOL HYDROCHLORIDE 20 MG: 20 TABLET ORAL at 09:09

## 2022-01-01 RX ADMIN — PRIMIDONE 50 MG: 50 TABLET ORAL at 09:18

## 2022-01-01 RX ADMIN — PROPRANOLOL HYDROCHLORIDE 20 MG: 20 TABLET ORAL at 09:44

## 2022-01-01 RX ADMIN — PROPRANOLOL HYDROCHLORIDE 20 MG: 20 TABLET ORAL at 09:31

## 2022-01-01 RX ADMIN — CEFEPIME 2000 MG: 2 INJECTION, POWDER, FOR SOLUTION INTRAVENOUS at 05:47

## 2022-01-01 RX ADMIN — PRIMIDONE 50 MG: 50 TABLET ORAL at 08:59

## 2022-01-01 RX ADMIN — PRIMIDONE 50 MG: 50 TABLET ORAL at 09:10

## 2022-01-01 RX ADMIN — MORPHINE SULFATE 4 MG: 4 INJECTION, SOLUTION INTRAMUSCULAR; INTRAVENOUS at 04:36

## 2022-01-01 RX ADMIN — PROPRANOLOL HYDROCHLORIDE 20 MG: 20 TABLET ORAL at 09:17

## 2022-01-01 RX ADMIN — SODIUM CHLORIDE: 9 INJECTION, SOLUTION INTRAVENOUS at 19:00

## 2022-01-01 RX ADMIN — LEVOTHYROXINE SODIUM 100 MCG: 0.1 TABLET ORAL at 04:32

## 2022-01-01 RX ADMIN — CEFEPIME 2000 MG: 2 INJECTION, POWDER, FOR SOLUTION INTRAVENOUS at 18:22

## 2022-01-01 RX ADMIN — ALLOPURINOL 100 MG: 100 TABLET ORAL at 10:14

## 2022-01-01 RX ADMIN — DEXAMETHASONE SODIUM PHOSPHATE 20 MG: 4 INJECTION, SOLUTION INTRA-ARTICULAR; INTRALESIONAL; INTRAMUSCULAR; INTRAVENOUS; SOFT TISSUE at 09:57

## 2022-01-01 RX ADMIN — CEFEPIME 2000 MG: 2 INJECTION, POWDER, FOR SOLUTION INTRAVENOUS at 17:50

## 2022-01-01 RX ADMIN — Medication 10 ML: at 09:49

## 2022-01-01 RX ADMIN — ALLOPURINOL 100 MG: 100 TABLET ORAL at 10:03

## 2022-01-01 RX ADMIN — DONEPEZIL HYDROCHLORIDE 10 MG: 5 TABLET, FILM COATED ORAL at 20:56

## 2022-01-01 RX ADMIN — DEXAMETHASONE SODIUM PHOSPHATE 20 MG: 4 INJECTION, SOLUTION INTRA-ARTICULAR; INTRALESIONAL; INTRAMUSCULAR; INTRAVENOUS; SOFT TISSUE at 12:22

## 2022-01-01 RX ADMIN — ACETAMINOPHEN 650 MG: 325 TABLET ORAL at 09:48

## 2022-01-01 RX ADMIN — MORPHINE SULFATE 2 MG: 2 INJECTION, SOLUTION INTRAMUSCULAR; INTRAVENOUS at 02:28

## 2022-01-01 RX ADMIN — SODIUM CHLORIDE: 9 INJECTION, SOLUTION INTRAVENOUS at 03:56

## 2022-01-01 RX ADMIN — ENOXAPARIN SODIUM 30 MG: 100 INJECTION SUBCUTANEOUS at 10:26

## 2022-01-01 RX ADMIN — Medication 10 ML: at 09:10

## 2022-01-01 RX ADMIN — ENOXAPARIN SODIUM 30 MG: 100 INJECTION SUBCUTANEOUS at 09:31

## 2022-01-01 RX ADMIN — ENOXAPARIN SODIUM 100 MG: 100 INJECTION SUBCUTANEOUS at 21:02

## 2022-01-01 RX ADMIN — CEFEPIME 2000 MG: 2 INJECTION, POWDER, FOR SOLUTION INTRAVENOUS at 06:43

## 2022-01-01 RX ADMIN — AMLODIPINE BESYLATE 10 MG: 5 TABLET ORAL at 08:59

## 2022-01-01 RX ADMIN — PRIMIDONE 50 MG: 50 TABLET ORAL at 10:14

## 2022-01-01 RX ADMIN — Medication 2 PUFF: at 09:27

## 2022-01-01 RX ADMIN — Medication 10 ML: at 10:14

## 2022-01-01 RX ADMIN — CEFEPIME 2000 MG: 2 INJECTION, POWDER, FOR SOLUTION INTRAVENOUS at 05:06

## 2022-01-01 RX ADMIN — Medication 10 ML: at 21:03

## 2022-01-01 RX ADMIN — ACETAMINOPHEN 650 MG: 325 TABLET ORAL at 06:30

## 2022-01-01 RX ADMIN — LEVOTHYROXINE SODIUM 100 MCG: 0.1 TABLET ORAL at 10:03

## 2022-01-01 RX ADMIN — PRIMIDONE 50 MG: 50 TABLET ORAL at 09:53

## 2022-01-01 RX ADMIN — MORPHINE SULFATE 2 MG: 2 INJECTION, SOLUTION INTRAMUSCULAR; INTRAVENOUS at 16:29

## 2022-01-01 RX ADMIN — MORPHINE SULFATE 2 MG: 2 INJECTION, SOLUTION INTRAMUSCULAR; INTRAVENOUS at 00:22

## 2022-01-01 RX ADMIN — LEVOTHYROXINE SODIUM 100 MCG: 0.1 TABLET ORAL at 05:11

## 2022-01-01 RX ADMIN — Medication 10 ML: at 20:29

## 2022-01-01 RX ADMIN — DEXAMETHASONE SODIUM PHOSPHATE 10 MG: 10 INJECTION, SOLUTION INTRAMUSCULAR; INTRAVENOUS at 09:31

## 2022-01-01 RX ADMIN — DONEPEZIL HYDROCHLORIDE 10 MG: 5 TABLET, FILM COATED ORAL at 20:42

## 2022-01-01 RX ADMIN — CEFEPIME 2000 MG: 2 INJECTION, POWDER, FOR SOLUTION INTRAVENOUS at 06:28

## 2022-01-01 RX ADMIN — MORPHINE SULFATE 2 MG: 2 INJECTION, SOLUTION INTRAMUSCULAR; INTRAVENOUS at 21:49

## 2022-01-01 RX ADMIN — PROPRANOLOL HYDROCHLORIDE 20 MG: 20 TABLET ORAL at 10:14

## 2022-01-01 RX ADMIN — ALLOPURINOL 100 MG: 100 TABLET ORAL at 09:31

## 2022-01-01 RX ADMIN — MORPHINE SULFATE 2 MG: 2 INJECTION, SOLUTION INTRAMUSCULAR; INTRAVENOUS at 19:24

## 2022-01-01 RX ADMIN — CEFEPIME 2000 MG: 2 INJECTION, POWDER, FOR SOLUTION INTRAVENOUS at 04:36

## 2022-01-01 RX ADMIN — FUROSEMIDE 20 MG: 10 INJECTION, SOLUTION INTRAMUSCULAR; INTRAVENOUS at 17:15

## 2022-01-01 RX ADMIN — DEXAMETHASONE SODIUM PHOSPHATE 20 MG: 4 INJECTION, SOLUTION INTRA-ARTICULAR; INTRALESIONAL; INTRAMUSCULAR; INTRAVENOUS; SOFT TISSUE at 10:15

## 2022-01-01 RX ADMIN — PRIMIDONE 50 MG: 50 TABLET ORAL at 10:25

## 2022-01-01 RX ADMIN — Medication 2 PUFF: at 09:00

## 2022-01-01 RX ADMIN — ENOXAPARIN SODIUM 30 MG: 100 INJECTION SUBCUTANEOUS at 20:42

## 2022-01-01 RX ADMIN — Medication 2 PUFF: at 19:51

## 2022-01-01 RX ADMIN — MORPHINE SULFATE 2 MG: 2 INJECTION, SOLUTION INTRAMUSCULAR; INTRAVENOUS at 11:34

## 2022-01-01 RX ADMIN — FUROSEMIDE 20 MG: 10 INJECTION, SOLUTION INTRAMUSCULAR; INTRAVENOUS at 09:10

## 2022-01-01 RX ADMIN — Medication 2 PUFF: at 21:01

## 2022-01-01 RX ADMIN — ACETAMINOPHEN 650 MG: 325 TABLET ORAL at 10:08

## 2022-01-01 RX ADMIN — DEXAMETHASONE SODIUM PHOSPHATE 20 MG: 4 INJECTION, SOLUTION INTRA-ARTICULAR; INTRALESIONAL; INTRAMUSCULAR; INTRAVENOUS; SOFT TISSUE at 12:35

## 2022-01-01 RX ADMIN — CEFEPIME 2000 MG: 2 INJECTION, POWDER, FOR SOLUTION INTRAVENOUS at 18:24

## 2022-01-01 RX ADMIN — CEFEPIME 2000 MG: 2 INJECTION, POWDER, FOR SOLUTION INTRAVENOUS at 16:20

## 2022-01-01 ASSESSMENT — PAIN SCALES - GENERAL
PAINLEVEL_OUTOF10: 3
PAINLEVEL_OUTOF10: 10
PAINLEVEL_OUTOF10: 10
PAINLEVEL_OUTOF10: 0
PAINLEVEL_OUTOF10: 0
PAINLEVEL_OUTOF10: 2
PAINLEVEL_OUTOF10: 0
PAINLEVEL_OUTOF10: 0
PAINLEVEL_OUTOF10: 9
PAINLEVEL_OUTOF10: 3
PAINLEVEL_OUTOF10: 5
PAINLEVEL_OUTOF10: 0
PAINLEVEL_OUTOF10: 9
PAINLEVEL_OUTOF10: 0
PAINLEVEL_OUTOF10: 10
PAINLEVEL_OUTOF10: 9
PAINLEVEL_OUTOF10: 0
PAINLEVEL_OUTOF10: 0
PAINLEVEL_OUTOF10: 3
PAINLEVEL_OUTOF10: 9
PAINLEVEL_OUTOF10: 0
PAINLEVEL_OUTOF10: 1

## 2022-01-01 ASSESSMENT — PAIN DESCRIPTION - LOCATION
LOCATION: GENERALIZED
LOCATION: GENERALIZED
LOCATION: BACK
LOCATION: SHOULDER
LOCATION: HIP
LOCATION: GENERALIZED
LOCATION: OTHER (COMMENT)
LOCATION: BACK
LOCATION: SHOULDER
LOCATION: HIP

## 2022-01-01 ASSESSMENT — PAIN SCALES - WONG BAKER
WONGBAKER_NUMERICALRESPONSE: 0
WONGBAKER_NUMERICALRESPONSE: 2
WONGBAKER_NUMERICALRESPONSE: 0
WONGBAKER_NUMERICALRESPONSE: 2

## 2022-01-01 ASSESSMENT — PAIN - FUNCTIONAL ASSESSMENT
PAIN_FUNCTIONAL_ASSESSMENT: NONE - DENIES PAIN
PAIN_FUNCTIONAL_ASSESSMENT: ACTIVITIES ARE NOT PREVENTED

## 2022-01-01 ASSESSMENT — PAIN DESCRIPTION - FREQUENCY: FREQUENCY: CONTINUOUS

## 2022-01-01 ASSESSMENT — PAIN DESCRIPTION - PAIN TYPE: TYPE: CHRONIC PAIN

## 2022-01-01 ASSESSMENT — PAIN DESCRIPTION - ORIENTATION
ORIENTATION: LEFT
ORIENTATION: LOWER
ORIENTATION: LEFT
ORIENTATION: LEFT

## 2022-01-01 ASSESSMENT — PAIN DESCRIPTION - DIRECTION: RADIATING_TOWARDS: LOWER

## 2022-01-01 ASSESSMENT — PAIN DESCRIPTION - DESCRIPTORS
DESCRIPTORS: ACHING

## 2022-01-01 ASSESSMENT — PAIN DESCRIPTION - ONSET: ONSET: ON-GOING

## 2022-03-01 ENCOUNTER — OFFICE VISIT (OUTPATIENT)
Dept: INTERNAL MEDICINE CLINIC | Age: 87
End: 2022-03-01
Payer: MEDICARE

## 2022-03-01 VITALS
DIASTOLIC BLOOD PRESSURE: 60 MMHG | WEIGHT: 237 LBS | HEART RATE: 56 BPM | OXYGEN SATURATION: 99 % | BODY MASS INDEX: 31.27 KG/M2 | SYSTOLIC BLOOD PRESSURE: 130 MMHG

## 2022-03-01 DIAGNOSIS — J43.1 PANLOBULAR EMPHYSEMA (HCC): ICD-10-CM

## 2022-03-01 DIAGNOSIS — I70.219 ATHEROSCLEROSIS OF NATIVE ARTERY OF LOWER EXTREMITY WITH INTERMITTENT CLAUDICATION, UNSPECIFIED LATERALITY (HCC): ICD-10-CM

## 2022-03-01 DIAGNOSIS — E03.9 ACQUIRED HYPOTHYROIDISM: Primary | ICD-10-CM

## 2022-03-01 DIAGNOSIS — C91.10 CHRONIC LYMPHOCYTIC LEUKEMIA OF B-CELL TYPE NOT HAVING ACHIEVED REMISSION (HCC): ICD-10-CM

## 2022-03-01 DIAGNOSIS — F03.90 DEMENTIA WITHOUT BEHAVIORAL DISTURBANCE, UNSPECIFIED DEMENTIA TYPE: ICD-10-CM

## 2022-03-01 DIAGNOSIS — E03.9 ACQUIRED HYPOTHYROIDISM: ICD-10-CM

## 2022-03-01 DIAGNOSIS — D68.9 COAGULATION DEFECT (HCC): ICD-10-CM

## 2022-03-01 DIAGNOSIS — I48.0 PAF (PAROXYSMAL ATRIAL FIBRILLATION) (HCC): ICD-10-CM

## 2022-03-01 LAB
A/G RATIO: 2.6 (ref 1.1–2.2)
ALBUMIN SERPL-MCNC: 4.4 G/DL (ref 3.4–5)
ALP BLD-CCNC: 54 U/L (ref 40–129)
ALT SERPL-CCNC: 8 U/L (ref 10–40)
ANION GAP SERPL CALCULATED.3IONS-SCNC: 14 MMOL/L (ref 3–16)
AST SERPL-CCNC: 13 U/L (ref 15–37)
BILIRUB SERPL-MCNC: 0.4 MG/DL (ref 0–1)
BUN BLDV-MCNC: 22 MG/DL (ref 7–20)
CALCIUM SERPL-MCNC: 9 MG/DL (ref 8.3–10.6)
CHLORIDE BLD-SCNC: 103 MMOL/L (ref 99–110)
CO2: 25 MMOL/L (ref 21–32)
CREAT SERPL-MCNC: 1.5 MG/DL (ref 0.8–1.3)
GFR AFRICAN AMERICAN: 53
GFR NON-AFRICAN AMERICAN: 44
GLUCOSE BLD-MCNC: 104 MG/DL (ref 70–99)
POTASSIUM SERPL-SCNC: 3.5 MMOL/L (ref 3.5–5.1)
SODIUM BLD-SCNC: 142 MMOL/L (ref 136–145)
TOTAL PROTEIN: 6.1 G/DL (ref 6.4–8.2)
TSH SERPL DL<=0.05 MIU/L-ACNC: 7.06 UIU/ML (ref 0.27–4.2)

## 2022-03-01 PROCEDURE — 3023F SPIROM DOC REV: CPT | Performed by: NURSE PRACTITIONER

## 2022-03-01 PROCEDURE — G8427 DOCREV CUR MEDS BY ELIG CLIN: HCPCS | Performed by: NURSE PRACTITIONER

## 2022-03-01 PROCEDURE — G8484 FLU IMMUNIZE NO ADMIN: HCPCS | Performed by: NURSE PRACTITIONER

## 2022-03-01 PROCEDURE — G8417 CALC BMI ABV UP PARAM F/U: HCPCS | Performed by: NURSE PRACTITIONER

## 2022-03-01 PROCEDURE — 99214 OFFICE O/P EST MOD 30 MIN: CPT | Performed by: NURSE PRACTITIONER

## 2022-03-01 PROCEDURE — 4040F PNEUMOC VAC/ADMIN/RCVD: CPT | Performed by: NURSE PRACTITIONER

## 2022-03-01 PROCEDURE — 1036F TOBACCO NON-USER: CPT | Performed by: NURSE PRACTITIONER

## 2022-03-01 PROCEDURE — 1123F ACP DISCUSS/DSCN MKR DOCD: CPT | Performed by: NURSE PRACTITIONER

## 2022-03-03 DIAGNOSIS — E03.9 ACQUIRED HYPOTHYROIDISM: ICD-10-CM

## 2022-03-03 RX ORDER — LEVOTHYROXINE SODIUM 0.1 MG/1
100 TABLET ORAL DAILY
Qty: 90 TABLET | Refills: 3 | Status: SHIPPED | OUTPATIENT
Start: 2022-03-03

## 2022-03-17 ASSESSMENT — ENCOUNTER SYMPTOMS
GASTROINTESTINAL NEGATIVE: 1
RESPIRATORY NEGATIVE: 1

## 2022-03-17 NOTE — PROGRESS NOTES
SUBJECTIVE:    Patient ID: Julisa Fuens is a 80 y.o. male. CC: Hypertension, dyslipidemia, hypothyroidism, renal insufficiency, atrial fibrillation    HPI: The patient presents to the office today for follow-up of chronic medical conditions. He has no new specific acute complaint today. At a past appointment, he was found to have supraclavicular lymphadenopathy. He was subsequently seen by hematology and diagnosed with B-cell CLL. He remains under their care. Lymphadenopathy has resolved. He has a history of hypertension and dyslipidemia. He denies any chest pain or shortness of breath. History of hypothyroidism. He is compliant with Synthroid. There is some question about recent medication availability. Most recent TSH was abnormal which was likely related. He has mild renal insufficiency which is being monitored. He has a history of atrial fibrillation, coronary artery disease. He remains under the care of cardiology.       Past Medical History:   Diagnosis Date    Bilateral cataracts     Bladder stones     Blepharitis     Cancer ([address]     prostate    Hypertension     Kidney stones     with bladder stones    Obesity     Pneumonia     SOB (shortness of breath)         Current Outpatient Medications   Medication Sig Dispense Refill    levothyroxine (SYNTHROID) 100 MCG tablet Take 1 tablet by mouth Daily 90 tablet 3    propranolol (INDERAL) 20 MG tablet TAKE 1 TABLET BY MOUTH DAILY 90 tablet 3    torsemide (DEMADEX) 20 MG tablet TAKE 1 TABLET BY MOUTH DAILY 30 tablet 5    amLODIPine (NORVASC) 10 MG tablet TAKE 1 TABLET BY MOUTH DAILY 90 tablet 3    potassium chloride (KLOR-CON M) 10 MEQ extended release tablet TAKE 1 TABLET BY MOUTH DAILY 90 tablet 3    donepezil (ARICEPT) 5 MG tablet TAKE 1 TABLET BY MOUTH EVERY NIGHT 90 tablet 3    primidone (MYSOLINE) 50 MG tablet TAKE 1 TABLET BY MOUTH DAILY 90 tablet 3    allopurinol (ZYLOPRIM) 100 MG tablet Take 100 mg by mouth daily      ibrutinib (IMBRUVICA) 420 MG tablet Take 420 mg by mouth daily      Calcium Carb-Cholecalciferol (CALCIUM/VITAMIN D PO) Take by mouth 1200 mg qd      multivitamin (ANTIOXIDANT;PROSIGHT) TABS per tablet Take 1 tablet by mouth daily. No current facility-administered medications for this visit. Review of Systems   Constitutional: Negative. Respiratory: Negative. Cardiovascular: Negative. Gastrointestinal: Negative. Genitourinary: Negative. Musculoskeletal: Negative. Neurological: Negative. Psychiatric/Behavioral: Negative. All other systems reviewed and are negative. OBJECTIVE:  Physical Exam  Vitals reviewed. Constitutional:       General: He is not in acute distress. Appearance: He is well-developed. He is not diaphoretic. HENT:      Head: Normocephalic and atraumatic. Eyes:      General: No scleral icterus. Conjunctiva/sclera: Conjunctivae normal.   Neck:      Vascular: No JVD. Cardiovascular:      Rate and Rhythm: Normal rate and regular rhythm. Pulmonary:      Effort: Pulmonary effort is normal. No respiratory distress. Breath sounds: Normal breath sounds. No wheezing or rales. Abdominal:      General: There is no distension. Palpations: Abdomen is soft. Tenderness: There is no abdominal tenderness. There is no guarding or rebound. Musculoskeletal:         General: Normal range of motion. Cervical back: Neck supple. Skin:     General: Skin is warm and dry. Neurological:      Mental Status: He is alert and oriented to person, place, and time. Psychiatric:         Behavior: Behavior normal.         Thought Content:  Thought content normal.        /60   Pulse 56   Wt 237 lb (107.5 kg)   SpO2 99%   BMI 31.27 kg/m²      PHQ Scores 10/28/2021 2/19/2021 10/23/2020 5/18/2020 9/11/2019 2/20/2019 7/26/2018   PHQ2 Score 0 0 0 0 0 0 0   PHQ9 Score 0 0 0 0 0 0 0     Interpretation of Total Score Depression Severity: 1-4 = Minimal depression, 5-9 = Mild depression, 10-14 = Moderate depression, 15-19 = Moderately severe depression, 20-27 =Severe depression        Assessment/Plan:  Sarah Mcgovern was seen today for hypothyroidism and skin cancer. 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 ([facility] [address] [facility]  - Continue plan per hem/onc  -     Comprehensive Metabolic Panel;  Future    Panlobular emphysema (HCC)  - Chronic, stable  - Continue current regimen    Dementia without behavioral disturbance, unspecified dementia type (HCC)  - Chronic, stable  - Continue current regimen    Atherosclerosis of native artery of lower extremity with intermittent claudication, unspecified laterality (HCC)  - Chronic, stable  - Continue current regimen    PAF (paroxysmal atrial fibrillation) (HCC)  - Chronic, stable  - Continue current regimen    Coagulation defect (HCC)  - Chronic, stable  - Continue current regimen      FAIZAN Rivera - CNP

## 2022-05-03 RX ORDER — TORSEMIDE 20 MG/1
20 TABLET ORAL DAILY
Qty: 30 TABLET | Refills: 5 | Status: SHIPPED | OUTPATIENT
Start: 2022-05-03 | End: 2022-10-31

## 2022-05-10 NOTE — PROGRESS NOTES
[facility]  H+P  Consult  OP Visit  FU Visit   CC HX HPI   GEN  Doing well. No new concerns. CAD  Ø CP, SOB. HTN  Ambulatory BP in good range. Ø HA/dizziness. AI  Mild historically. No sx. MED  Compliant with CV meds. Ø reported SA. HISTORY/ALLERGY/ROS   MEDHx  has a past medical history of Bilateral cataracts, Bladder stones, Blepharitis, Cancer ([address] Hypertension, Kidney stones, Obesity, Pneumonia, and SOB (shortness of breath). SURGHx  has a past surgical history that includes Skin cancer excision; Cholecystectomy; Prostatectomy; Colonoscopy; Cystocopy (8/13/12); other surgical history (8/25/12); and eye surgery. SOCHx  reports that he quit smoking about 37 years ago. He has a 38.00 pack-year smoking history. He has never used smokeless tobacco. He reports that he does not drink alcohol and does not use drugs. FAMHx family history includes Bleeding Prob (age of onset: 47) in his father; Dementia in his mother; High Blood Pressure in his mother; High Cholesterol in his mother; Other (age of onset: [de-identified]) in his mother.    ALLERG Oxycodone-acetaminophen and Adhesive tape   ROS Full ROS obtained and negative except as mentioned in HPI   MEDICATIONS   Current Outpatient Medications   Medication Sig Dispense Refill    torsemide (DEMADEX) 20 MG tablet TAKE 1 TABLET BY MOUTH DAILY 30 tablet 5    levothyroxine (SYNTHROID) 100 MCG tablet Take 1 tablet by mouth Daily 90 tablet 3    propranolol (INDERAL) 20 MG tablet TAKE 1 TABLET BY MOUTH DAILY 90 tablet 3    amLODIPine (NORVASC) 10 MG tablet TAKE 1 TABLET BY MOUTH DAILY 90 tablet 3    potassium chloride (KLOR-CON M) 10 MEQ extended release tablet TAKE 1 TABLET BY MOUTH DAILY 90 tablet 3    donepezil (ARICEPT) 5 MG tablet TAKE 1 TABLET BY MOUTH EVERY NIGHT 90 tablet 3    primidone (MYSOLINE) 50 MG tablet TAKE 1 TABLET BY MOUTH DAILY 90 tablet 3    allopurinol (ZYLOPRIM) 100 MG tablet Take 100 mg by mouth daily      ibrutinib (IMBRUVICA) 420 MG tablet Take 420 mg by mouth daily      Calcium Carb-Cholecalciferol (CALCIUM/VITAMIN D PO) Take by mouth 1200 mg qd      multivitamin (ANTIOXIDANT;PROSIGHT) TABS per tablet Take 1 tablet by mouth daily. No current facility-administered medications for this visit. PHYSICAL EXAM   Vitals Vitals:    05/12/22 1049   BP: 110/68   Pulse: (!) 45   SpO2: 98%      Gen Alert, coop, no distress Heart  Rrr, no mrg   Head NC, AT, no abnorm Abd  Soft, NT, +BS, no mass, no OM   Eyes PER, conj/corn clear Ext  Ext nl, AT, no C/C, +edema   Nose Nares nl, no drain, NT Pulse 2+ and symmetric   Throat Lips, mucosa, tongue nl Skin Col/text/turg nl, no vis rash/les   Neck S/S, TM, NT, no bruit/JVD Psych Nl mood and affect   Lung CTA-B, unlabored, no DTP Lymph   No cervical or axillary LA   Ch wall NT, no deform Neuro  Nl gross M/S exam      CODING   SCI (35961) - CAD, AI, HTN, OBESITY  30-39 minutes preparing to see pt including review hx, tests, consults, perf exam, , educating pt, fam, caregiver, ordering meds/tests/procedures, referring and comm with pcps and other consultants, documenting info in EMR, interpreting results and communicating to fam and coordination of pt care. ASSESSMENT AND PLAN   *CAD   Date EF Results   Sx   No concerning   LHC   Patient declined   MPI 3/12  Mixed inferior I/S   TTE 8/16 55% Mild AI, mild to mod TR   Plan   Continue aggressive medical treatment at doses above   *AI   Date EF Detail   Sx   No concerning   TTE 8/16 55% Mild AI   Plan   Continued observation   *HTN  Status Controlled  Plan Counseled on diet/salt/exercise/weight, continue meds at doses above  *OBESITY  Status Uncontrolled with a BMI of Body mass index is 31.95 kg/m².  available historical weights reviewed personally  Plan Counseled on diet, exercise, weight loss options in detail  *COMPLIANCE  Status Compliant  Plan Discussed importance of compliance with meds/diet/salt/exercise; avoid tob/alc/drugs; patient verbalized understanding  *FOLLOWUP  12 months    [address] Edmundo Tsang, am scribing for and in the presence of Margarito Hooks MD.   SignedEdmundo 05/10/22 10:42 AM   Provider Annie Dantefabian is working as a scribe for and in the presence of mindi Hooks MD). Working as a scribe, Edmundo Bales may have prepopulated components of this note with my historical  intellectual property under my direct supervision. Any additions to this intellectual property were performed in my presence and at my direction.   Furthermore, the content and accuracy of this note have been reviewed by mindi Hooks MD).  5/12/2022 11:11 AM

## 2022-05-12 ENCOUNTER — OFFICE VISIT (OUTPATIENT)
Dept: CARDIOLOGY CLINIC | Age: 87
End: 2022-05-12
Payer: MEDICARE

## 2022-05-12 VITALS
WEIGHT: 242.2 LBS | HEIGHT: 73 IN | DIASTOLIC BLOOD PRESSURE: 68 MMHG | HEART RATE: 45 BPM | OXYGEN SATURATION: 98 % | SYSTOLIC BLOOD PRESSURE: 110 MMHG | BODY MASS INDEX: 32.1 KG/M2

## 2022-05-12 DIAGNOSIS — I35.1 NONRHEUMATIC AORTIC VALVE INSUFFICIENCY: ICD-10-CM

## 2022-05-12 DIAGNOSIS — I25.10 CORONARY ARTERY DISEASE DUE TO LIPID RICH PLAQUE: Primary | ICD-10-CM

## 2022-05-12 DIAGNOSIS — I10 ESSENTIAL HYPERTENSION: ICD-10-CM

## 2022-05-12 DIAGNOSIS — E66.9 OBESITY (BMI 30-39.9): ICD-10-CM

## 2022-05-12 DIAGNOSIS — I25.83 CORONARY ARTERY DISEASE DUE TO LIPID RICH PLAQUE: Primary | ICD-10-CM

## 2022-05-12 PROCEDURE — 99214 OFFICE O/P EST MOD 30 MIN: CPT | Performed by: INTERNAL MEDICINE

## 2022-05-12 PROCEDURE — 1036F TOBACCO NON-USER: CPT | Performed by: INTERNAL MEDICINE

## 2022-05-12 PROCEDURE — 1123F ACP DISCUSS/DSCN MKR DOCD: CPT | Performed by: INTERNAL MEDICINE

## 2022-05-12 PROCEDURE — G8417 CALC BMI ABV UP PARAM F/U: HCPCS | Performed by: INTERNAL MEDICINE

## 2022-05-12 PROCEDURE — G8427 DOCREV CUR MEDS BY ELIG CLIN: HCPCS | Performed by: INTERNAL MEDICINE

## 2022-05-12 PROCEDURE — 4040F PNEUMOC VAC/ADMIN/RCVD: CPT | Performed by: INTERNAL MEDICINE

## 2022-06-02 ENCOUNTER — OFFICE VISIT (OUTPATIENT)
Dept: INTERNAL MEDICINE CLINIC | Age: 87
End: 2022-06-02
Payer: MEDICARE

## 2022-06-02 VITALS
SYSTOLIC BLOOD PRESSURE: 124 MMHG | HEIGHT: 73 IN | DIASTOLIC BLOOD PRESSURE: 80 MMHG | WEIGHT: 240 LBS | HEART RATE: 95 BPM | OXYGEN SATURATION: 98 % | BODY MASS INDEX: 31.81 KG/M2

## 2022-06-02 DIAGNOSIS — E55.9 VITAMIN D DEFICIENCY: ICD-10-CM

## 2022-06-02 DIAGNOSIS — Z12.5 PROSTATE CANCER SCREENING: ICD-10-CM

## 2022-06-02 DIAGNOSIS — E03.9 ACQUIRED HYPOTHYROIDISM: ICD-10-CM

## 2022-06-02 DIAGNOSIS — N18.32 STAGE 3B CHRONIC KIDNEY DISEASE (HCC): ICD-10-CM

## 2022-06-02 DIAGNOSIS — M79.10 MYALGIA: ICD-10-CM

## 2022-06-02 DIAGNOSIS — I10 ESSENTIAL HYPERTENSION: Primary | ICD-10-CM

## 2022-06-02 DIAGNOSIS — C91.10 CHRONIC LYMPHOCYTIC LEUKEMIA OF B-CELL TYPE NOT HAVING ACHIEVED REMISSION (HCC): ICD-10-CM

## 2022-06-02 PROBLEM — N18.30 CHRONIC RENAL DISEASE, STAGE III (HCC): Status: ACTIVE | Noted: 2022-01-01

## 2022-06-02 LAB
ANION GAP SERPL CALCULATED.3IONS-SCNC: 15 MMOL/L (ref 3–16)
BUN BLDV-MCNC: 27 MG/DL (ref 7–20)
CALCIUM SERPL-MCNC: 9.4 MG/DL (ref 8.3–10.6)
CHLORIDE BLD-SCNC: 105 MMOL/L (ref 99–110)
CO2: 24 MMOL/L (ref 21–32)
CREAT SERPL-MCNC: 1.5 MG/DL (ref 0.8–1.3)
GFR AFRICAN AMERICAN: 53
GFR NON-AFRICAN AMERICAN: 44
GLUCOSE BLD-MCNC: 103 MG/DL (ref 70–99)
POTASSIUM SERPL-SCNC: 4.3 MMOL/L (ref 3.5–5.1)
PROSTATE SPECIFIC ANTIGEN: 0.69 NG/ML (ref 0–4)
SODIUM BLD-SCNC: 144 MMOL/L (ref 136–145)
TSH SERPL DL<=0.05 MIU/L-ACNC: 4.85 UIU/ML (ref 0.27–4.2)
VITAMIN D 25-HYDROXY: 56.9 NG/ML

## 2022-06-02 PROCEDURE — 1123F ACP DISCUSS/DSCN MKR DOCD: CPT | Performed by: NURSE PRACTITIONER

## 2022-06-02 PROCEDURE — G8427 DOCREV CUR MEDS BY ELIG CLIN: HCPCS | Performed by: NURSE PRACTITIONER

## 2022-06-02 PROCEDURE — 1036F TOBACCO NON-USER: CPT | Performed by: NURSE PRACTITIONER

## 2022-06-02 PROCEDURE — 99214 OFFICE O/P EST MOD 30 MIN: CPT | Performed by: NURSE PRACTITIONER

## 2022-06-02 PROCEDURE — G8417 CALC BMI ABV UP PARAM F/U: HCPCS | Performed by: NURSE PRACTITIONER

## 2022-06-02 SDOH — ECONOMIC STABILITY: FOOD INSECURITY: WITHIN THE PAST 12 MONTHS, THE FOOD YOU BOUGHT JUST DIDN'T LAST AND YOU DIDN'T HAVE MONEY TO GET MORE.: NEVER TRUE

## 2022-06-02 SDOH — ECONOMIC STABILITY: FOOD INSECURITY: WITHIN THE PAST 12 MONTHS, YOU WORRIED THAT YOUR FOOD WOULD RUN OUT BEFORE YOU GOT MONEY TO BUY MORE.: NEVER TRUE

## 2022-06-02 ASSESSMENT — PATIENT HEALTH QUESTIONNAIRE - PHQ9
SUM OF ALL RESPONSES TO PHQ QUESTIONS 1-9: 0
SUM OF ALL RESPONSES TO PHQ9 QUESTIONS 1 & 2: 0
SUM OF ALL RESPONSES TO PHQ QUESTIONS 1-9: 0
2. FEELING DOWN, DEPRESSED OR HOPELESS: 0
SUM OF ALL RESPONSES TO PHQ QUESTIONS 1-9: 0
SUM OF ALL RESPONSES TO PHQ QUESTIONS 1-9: 0
1. LITTLE INTEREST OR PLEASURE IN DOING THINGS: 0

## 2022-06-02 ASSESSMENT — SOCIAL DETERMINANTS OF HEALTH (SDOH): HOW HARD IS IT FOR YOU TO PAY FOR THE VERY BASICS LIKE FOOD, HOUSING, MEDICAL CARE, AND HEATING?: NOT HARD AT ALL

## 2022-06-09 ASSESSMENT — ENCOUNTER SYMPTOMS
RESPIRATORY NEGATIVE: 1
GASTROINTESTINAL NEGATIVE: 1

## 2022-06-09 NOTE — PROGRESS NOTES
6/1/2020 4:32 PM 
 
Ms. Nichole [address] [address] Mrs Prashant Balderrama is currently under my care for type 2 diabetes management. This is to notify that this patient is at high risk for developing complications if he/she gets affected by SARS COVID -2 virus and must be provided with an environment where he /she will have least human contact to avoid exposure to the virus. Sincerely, Valeria Tipton MD 
 
 SUBJECTIVE:    Patient ID: Michelle Leal is a 80 y.o. male. CC: Hypertension, dyslipidemia, hypothyroidism, renal insufficiency, atrial fibrillation    HPI: The patient presents to the office today for follow-up of chronic medical conditions. He has no new specific acute complaint today. 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 Lymphadenopathy has resolved. He has a history of hypertension and dyslipidemia. He denies any chest pain or shortness of breath. History of hypothyroidism. He is compliant with Synthroid. He has mild renal insufficiency which is being monitored. He has a history of atrial fibrillation, coronary artery disease. He remains under the care of cardiology.       Past Medical History:   Diagnosis Date    Bilateral cataracts     Bladder stones     Blepharitis     Cancer ([facility] [address]     prostate    Hypertension     Kidney stones     with bladder stones    Obesity     Pneumonia     SOB (shortness of breath)         Current Outpatient Medications   Medication Sig Dispense Refill    torsemide (DEMADEX) 20 MG tablet TAKE 1 TABLET BY MOUTH DAILY 30 tablet 5    levothyroxine (SYNTHROID) 100 MCG tablet Take 1 tablet by mouth Daily 90 tablet 3    propranolol (INDERAL) 20 MG tablet TAKE 1 TABLET BY MOUTH DAILY 90 tablet 3    amLODIPine (NORVASC) 10 MG tablet TAKE 1 TABLET BY MOUTH DAILY 90 tablet 3    potassium chloride (KLOR-CON M) 10 MEQ extended release tablet TAKE 1 TABLET BY MOUTH DAILY 90 tablet 3    donepezil (ARICEPT) 5 MG tablet TAKE 1 TABLET BY MOUTH EVERY NIGHT 90 tablet 3    primidone (MYSOLINE) 50 MG tablet TAKE 1 TABLET BY MOUTH DAILY 90 tablet 3    allopurinol (ZYLOPRIM) 100 MG tablet Take 100 mg by mouth daily      ibrutinib (IMBRUVICA) 420 MG tablet Take 420 mg by mouth daily      Calcium Carb-Cholecalciferol (CALCIUM/VITAMIN D PO) Take by mouth 1200 mg qd      multivitamin (ANTIOXIDANT;PROSIGHT) TABS per tablet Take 1 tablet by mouth daily. No current facility-administered medications for this visit. Review of Systems   Constitutional: Negative. Respiratory: Negative. Cardiovascular: Negative. Gastrointestinal: Negative. Genitourinary: Negative. Musculoskeletal: Positive for myalgias. Neurological: Negative. Psychiatric/Behavioral: Negative. All other systems reviewed and are negative. OBJECTIVE:  Physical Exam  Vitals reviewed. Constitutional:       General: He is not in acute distress. Appearance: He is well-developed. He is not diaphoretic. HENT:      Head: Normocephalic and atraumatic. Eyes:      General: No scleral icterus. Conjunctiva/sclera: Conjunctivae normal.   Neck:      Vascular: No JVD. Cardiovascular:      Rate and Rhythm: Normal rate and regular rhythm. Pulmonary:      Effort: Pulmonary effort is normal. No respiratory distress. Breath sounds: Normal breath sounds. No wheezing or rales. Abdominal:      General: There is no distension. Palpations: Abdomen is soft. Tenderness: There is no abdominal tenderness. There is no guarding or rebound. Musculoskeletal:         General: Normal range of motion. Cervical back: Neck supple. Skin:     General: Skin is warm and dry. Neurological:      Mental Status: He is alert and oriented to person, place, and time. Psychiatric:         Behavior: Behavior normal.         Thought Content:  Thought content normal.        /80   Pulse 95   Ht 6' 1\" (1.854 m)   Wt 240 lb (108.9 kg)   SpO2 98%   BMI 31.66 kg/m²      PHQ Scores 6/2/2022 10/28/2021 2/19/2021 10/23/2020 5/18/2020 9/11/2019 2/20/2019   PHQ2 Score 0 0 0 0 0 0 0   PHQ9 Score 0 0 0 0 0 0 0     Interpretation of Total Score Depression Severity: 1-4 = Minimal depression, 5-9 = Mild depression, 10-14 = Moderate depression, 15-19 = Moderately severe depression, 20-27 =Severe depression        Assessment/Plan:  Luzma Jasso was seen today for 3 month follow-up and other. Diagnoses and all orders for this visit:    Essential hypertension  - Normotensive  - he has met JNC standards.  - Continue current regimen. 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

## 2022-06-30 ENCOUNTER — OFFICE VISIT (OUTPATIENT)
Dept: ENT CLINIC | Age: 87
End: 2022-06-30
Payer: MEDICARE

## 2022-06-30 VITALS — TEMPERATURE: 97.8 F | HEART RATE: 56 BPM | DIASTOLIC BLOOD PRESSURE: 63 MMHG | SYSTOLIC BLOOD PRESSURE: 124 MMHG

## 2022-06-30 DIAGNOSIS — C91.10 CLL (CHRONIC LYMPHOCYTIC LEUKEMIA) (HCC): ICD-10-CM

## 2022-06-30 DIAGNOSIS — H61.23 BILATERAL IMPACTED CERUMEN: Primary | ICD-10-CM

## 2022-06-30 PROCEDURE — G8417 CALC BMI ABV UP PARAM F/U: HCPCS | Performed by: STUDENT IN AN ORGANIZED HEALTH CARE EDUCATION/TRAINING PROGRAM

## 2022-06-30 PROCEDURE — 1036F TOBACCO NON-USER: CPT | Performed by: STUDENT IN AN ORGANIZED HEALTH CARE EDUCATION/TRAINING PROGRAM

## 2022-06-30 PROCEDURE — G8427 DOCREV CUR MEDS BY ELIG CLIN: HCPCS | Performed by: STUDENT IN AN ORGANIZED HEALTH CARE EDUCATION/TRAINING PROGRAM

## 2022-06-30 PROCEDURE — 99212 OFFICE O/P EST SF 10 MIN: CPT | Performed by: STUDENT IN AN ORGANIZED HEALTH CARE EDUCATION/TRAINING PROGRAM

## 2022-06-30 PROCEDURE — 1123F ACP DISCUSS/DSCN MKR DOCD: CPT | Performed by: STUDENT IN AN ORGANIZED HEALTH CARE EDUCATION/TRAINING PROGRAM

## 2022-06-30 PROCEDURE — 69210 REMOVE IMPACTED EAR WAX UNI: CPT | Performed by: STUDENT IN AN ORGANIZED HEALTH CARE EDUCATION/TRAINING PROGRAM

## 2022-06-30 NOTE — PROGRESS NOTES
[address] VISIT      Patient Name: Teja Driscoll  Medical Record Number:  [id number]  Primary Care Physician:  FAIZAN Sharp CNP    ChiefComplaint     Chief Complaint   Patient presents with    Ear Problem     ear cleaning        History of Present Illness     Teja Driscoll is an 80 y.o. male previously seen for cerumen debridement, last seen by Dr. Des Rivera on 2021. Interval History:   Presents today for ear wax debridement. Hx of CLL. Currently being treated by Mettu who he sees every other month.      Past Medical History     Past Medical History:   Diagnosis Date    Bilateral cataracts     Bladder stones     Blepharitis     Cancer ([address]     prostate    Hypertension     Kidney stones     with bladder stones    Obesity     Pneumonia     SOB (shortness of breath)        Past Surgical History     Past Surgical History:   Procedure Laterality Date    CHOLECYSTECTOMY           COLONOSCOPY      polypectomy    CYSTOSCOPY  12    left retrograde    EYE SURGERY      OTHER SURGICAL HISTORY  12    left ureteral lithotomy    PROSTATECTOMY      with abd panniculectomy     SKIN CANCER EXCISION      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       Family History     Family History   Problem Relation Age of Onset    Dementia Mother     Other Mother [de-identified]        pneumonia    High Blood Pressure Mother     High Cholesterol Mother     Bleeding Prob Father 47        blood poisoning    Heart Disease Neg Hx        Social History     Social History     Tobacco Use    Smoking status: Former Smoker     Packs/day: 1.00     Years: 38.00     Pack years: 38.00     Quit date: 1984     Years since quittin.0    Smokeless tobacco: Never Used   Vaping Use    Vaping Use: Never used   Substance Use Topics    Alcohol use: No    Drug use: No        Allergies     Allergies   Allergen Reactions    Oxycodone-Acetaminophen Other (See Comments)     halucinations  Other reaction(s): hallucinations    Adhesive Tape      Skin becomes raw       Medications     Current Outpatient Medications   Medication Sig Dispense Refill    torsemide (DEMADEX) 20 MG tablet TAKE 1 TABLET BY MOUTH DAILY 30 tablet 5    levothyroxine (SYNTHROID) 100 MCG tablet Take 1 tablet by mouth Daily 90 tablet 3    propranolol (INDERAL) 20 MG tablet TAKE 1 TABLET BY MOUTH DAILY 90 tablet 3    amLODIPine (NORVASC) 10 MG tablet TAKE 1 TABLET BY MOUTH DAILY 90 tablet 3    potassium chloride (KLOR-CON M) 10 MEQ extended release tablet TAKE 1 TABLET BY MOUTH DAILY 90 tablet 3    donepezil (ARICEPT) 5 MG tablet TAKE 1 TABLET BY MOUTH EVERY NIGHT 90 tablet 3    primidone (MYSOLINE) 50 MG tablet TAKE 1 TABLET BY MOUTH DAILY 90 tablet 3    allopurinol (ZYLOPRIM) 100 MG tablet Take 100 mg by mouth daily      ibrutinib (IMBRUVICA) 420 MG tablet Take 420 mg by mouth daily      Calcium Carb-Cholecalciferol (CALCIUM/VITAMIN D PO) Take by mouth 1200 mg qd      multivitamin (ANTIOXIDANT;PROSIGHT) TABS per tablet Take 1 tablet by mouth daily. No current facility-administered medications for this visit. Review of Systems     REVIEW OF SYSTEM: See HPI above    PhysicalExam     Vitals:    06/30/22 1252   BP: 124/63   Site: Left Upper Arm   Position: Sitting   Cuff Size: Medium Adult   Pulse: 56   Temp: 97.8 °F (36.6 °C)   TempSrc: Temporal       PHYSICAL EXAM  /63 (Site: Left Upper Arm, Position: Sitting, Cuff Size: Medium Adult)   Pulse 56   Temp 97.8 °F (36.6 °C) (Temporal)     GENERAL: No acute distress, alert and oriented. EYES: EOMI, Anti-icteric. NOSE: On anterior rhinoscopy there is no epistaxis, nasal mucosa moist and normal appearing, no purulent drainage. EARS: Normal external appearance; on portable otomicroscopy with cerumen impaction, see procedure note below.   FACE: HB 1/6 bilaterally, symmetric appearing, sensation equal bilaterally  ORAL CAVITY: No masses or lesions visualized or palpated, uvula is midline, moist mucous membranes, no oropharyngeal masses or oropharyngeal obstruction  NECK: Normal range of motion, no thyromegaly, trachea is midline, no palpable lymphadenopathy or neck masses, no crepitus  CHEST: Normal respiratory effort, breathing comfortably, no retractions  SKIN: No rashes, normal appearing skin, no evidence of skin lesions/tumors  NEURO: Cranial Nerves 2, 3, 4, 5, 6, 7, 11, 12 grossly intact bilaterally     I have performed a head and neck physical exam personally or was physically present during the key or critical portions of the service. Procedure     Procedure: Binocular otomicroscopy with debridement of cerumen impaction    Pre-op: Cerumen impaction of the bilateral external auditory canals. Post op: Same  Procedure : Binocular otomicroscopy with debridement of cerumen of bilateral external auditory canals  Surgeon: Dr. Vicki Valencia DO  Estimated Blood Loss: None    Description of Procedure:    After obtaining verbal consent, the patient was placed in the examination chair in the reclined position.      -Right ear: External auditory canal with occluding cerumen limiting visualization of the tympanic membrane which was removed with use of #7 and #5 Egyptian suction, alligator forceps, and cerumen loop curet. After successful removal of cerumen, the right tympanic membrane was visualized and without significant retractions or cholesteatoma, no middle ear effusions.   -Left ear: External auditory canal with occluding cerumen limiting visualization of the tympanic membrane which was removed with use of #7 and #5 Egyptian suction, alligator forceps, and cerumen loop curet. After successful removal of cerumen, the left tympanic membrane was visualized and without significant retractions or cholesteatoma, no middle ear effusions.      * Patient tolerated the procedure well with no complications    Assessment and Plan     1. Bilateral impacted cerumen  - Cerumen debrided in the office today  - Avoid Q-tip and self instrumentation of ears  - Hydrogen peroxide or Debrox (OTC) drops as needed or once every other week to help break up and soften wax  - Follow-up 1 year or as needed for repeat debridement    2. CLL (chronic lymphocytic leukemia) (HCC)  - No palpable cervical lymphadenopathy noted on exam today. Continue follow-up with heme/onc      Follow-Up     No follow-ups on file. Deepali Vazquez 46  Department of Otolaryngology/Head and Neck Surgery  6/30/22    Medical Decision Making: The following items were considered in medical decision making:  Independent review of images  Review / order clinical lab tests  Review / order radiology tests  Decision to obtain old records      This note was generated completely or in part utilizing Dragon dictation speech recognition software. Occasionally, words are mistranscribed and despite editing, the text may contain inaccuracies due to incorrect word recognition. If further clarification is needed please contact the office at [phone].

## 2022-07-30 DIAGNOSIS — F03.90 DEMENTIA WITHOUT BEHAVIORAL DISTURBANCE, UNSPECIFIED DEMENTIA TYPE: ICD-10-CM

## 2022-07-30 DIAGNOSIS — R25.1 TREMOR: ICD-10-CM

## 2022-08-01 RX ORDER — DONEPEZIL HYDROCHLORIDE 5 MG/1
TABLET, FILM COATED ORAL
Qty: 90 TABLET | Refills: 3 | Status: SHIPPED | OUTPATIENT
Start: 2022-08-01 | End: 2022-09-02

## 2022-08-01 RX ORDER — PRIMIDONE 50 MG/1
50 TABLET ORAL DAILY
Qty: 90 TABLET | Refills: 3 | Status: SHIPPED | OUTPATIENT
Start: 2022-08-01

## 2022-09-02 ENCOUNTER — OFFICE VISIT (OUTPATIENT)
Dept: INTERNAL MEDICINE CLINIC | Age: 87
End: 2022-09-02
Payer: MEDICARE

## 2022-09-02 VITALS
BODY MASS INDEX: 31.07 KG/M2 | TEMPERATURE: 97.2 F | SYSTOLIC BLOOD PRESSURE: 124 MMHG | OXYGEN SATURATION: 97 % | WEIGHT: 234.4 LBS | HEIGHT: 73 IN | HEART RATE: 58 BPM | DIASTOLIC BLOOD PRESSURE: 70 MMHG

## 2022-09-02 DIAGNOSIS — Z12.5 PROSTATE CANCER SCREENING: ICD-10-CM

## 2022-09-02 DIAGNOSIS — F03.90 DEMENTIA WITHOUT BEHAVIORAL DISTURBANCE, UNSPECIFIED DEMENTIA TYPE: Primary | ICD-10-CM

## 2022-09-02 DIAGNOSIS — E55.9 VITAMIN D DEFICIENCY: ICD-10-CM

## 2022-09-02 DIAGNOSIS — I10 ESSENTIAL HYPERTENSION: ICD-10-CM

## 2022-09-02 DIAGNOSIS — C91.10 CHRONIC LYMPHOCYTIC LEUKEMIA OF B-CELL TYPE NOT HAVING ACHIEVED REMISSION (HCC): ICD-10-CM

## 2022-09-02 DIAGNOSIS — E03.9 ACQUIRED HYPOTHYROIDISM: ICD-10-CM

## 2022-09-02 DIAGNOSIS — N18.32 STAGE 3B CHRONIC KIDNEY DISEASE (HCC): ICD-10-CM

## 2022-09-02 PROCEDURE — 1036F TOBACCO NON-USER: CPT | Performed by: NURSE PRACTITIONER

## 2022-09-02 PROCEDURE — 1123F ACP DISCUSS/DSCN MKR DOCD: CPT | Performed by: NURSE PRACTITIONER

## 2022-09-02 PROCEDURE — 99214 OFFICE O/P EST MOD 30 MIN: CPT | Performed by: NURSE PRACTITIONER

## 2022-09-02 PROCEDURE — G8417 CALC BMI ABV UP PARAM F/U: HCPCS | Performed by: NURSE PRACTITIONER

## 2022-09-02 PROCEDURE — G8427 DOCREV CUR MEDS BY ELIG CLIN: HCPCS | Performed by: NURSE PRACTITIONER

## 2022-09-02 RX ORDER — DONEPEZIL HYDROCHLORIDE 10 MG/1
10 TABLET, FILM COATED ORAL NIGHTLY
Qty: 90 TABLET | Refills: 3 | Status: SHIPPED | OUTPATIENT
Start: 2022-09-02

## 2022-09-02 ASSESSMENT — ENCOUNTER SYMPTOMS
RESPIRATORY NEGATIVE: 1
GASTROINTESTINAL NEGATIVE: 1

## 2022-09-02 NOTE — PROGRESS NOTES
SUBJECTIVE:    Patient ID: Jamari Single is a 80 y.o. male. CC: Hand cramping and pain, short-term memory worsening, CLL, hypertension, dyslipidemia, hypothyroidism    HPI: The patient presents to the office today for routine follow-up of chronic medical conditions as well as ongoing healthcare concerns. 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 Lymphadenopathy has resolved. He has a history of hypertension and dyslipidemia. He denies any chest pain or shortness of breath. History of hypothyroidism. He is compliant with Synthroid. He has mild renal insufficiency which is being monitored. He has a history of atrial fibrillation, coronary artery disease. He remains under the care of cardiology. Patient has a history of dementia. He has been on benazepril which he is taking as directed. He feels that short-term memory is worsening. He is amenable to a dosing increase. He is having hand pain and cramps. Tylenol seems to be helpful. Stretching also seems to be helpful.       Past Medical History:   Diagnosis Date    Bilateral cataracts     Bladder stones     Blepharitis     Cancer ([facility] [address]     prostate    Hypertension     Kidney stones     with bladder stones    Obesity     Pneumonia     SOB (shortness of breath)         Current Outpatient Medications   Medication Sig Dispense Refill    primidone (MYSOLINE) 50 MG tablet TAKE 1 TABLET BY MOUTH DAILY 90 tablet 3    donepezil (ARICEPT) 5 MG tablet TAKE 1 TABLET BY MOUTH EVERY NIGHT 90 tablet 3    torsemide (DEMADEX) 20 MG tablet TAKE 1 TABLET BY MOUTH DAILY 30 tablet 5    levothyroxine (SYNTHROID) 100 MCG tablet Take 1 tablet by mouth Daily 90 tablet 3    propranolol (INDERAL) 20 MG tablet TAKE 1 TABLET BY MOUTH DAILY 90 tablet 3    amLODIPine (NORVASC) 10 MG tablet TAKE 1 TABLET BY MOUTH DAILY 90 tablet 3    potassium chloride (KLOR-CON M) 10 MEQ extended release tablet TAKE 1 TABLET BY MOUTH DAILY 90 tablet 3 allopurinol (ZYLOPRIM) 100 MG tablet Take 100 mg by mouth daily      ibrutinib (IMBRUVICA) 420 MG tablet Take 420 mg by mouth daily      Calcium Carb-Cholecalciferol (CALCIUM/VITAMIN D PO) Take by mouth 1200 mg qd      multivitamin (ANTIOXIDANT;PROSIGHT) TABS per tablet Take 1 tablet by mouth daily. No current facility-administered medications for this visit. Review of Systems   Constitutional: Negative. Respiratory: Negative. Cardiovascular: Negative. Gastrointestinal: Negative. Genitourinary: Negative. Musculoskeletal:  Positive for arthralgias. Neurological:         Memory worsening   Psychiatric/Behavioral: Negative. All other systems reviewed and are negative. OBJECTIVE:  Physical Exam  Vitals reviewed. Constitutional:       General: He is not in acute distress. Appearance: He is well-developed. He is not diaphoretic. HENT:      Head: Normocephalic and atraumatic. Eyes:      General: No scleral icterus. Conjunctiva/sclera: Conjunctivae normal.   Neck:      Vascular: No JVD. Cardiovascular:      Rate and Rhythm: Normal rate and regular rhythm. Pulmonary:      Effort: Pulmonary effort is normal. No respiratory distress. Breath sounds: Normal breath sounds. No wheezing or rales. Abdominal:      General: There is no distension. Palpations: Abdomen is soft. Tenderness: There is no abdominal tenderness. There is no guarding or rebound. Musculoskeletal:         General: Normal range of motion. Cervical back: Neck supple. Skin:     General: Skin is warm and dry. Neurological:      Mental Status: He is alert and oriented to person, place, and time. Psychiatric:         Behavior: Behavior normal.         Thought Content:  Thought content normal.      /70   Pulse 58   Temp 97.2 °F (36.2 °C)   Ht 6' 1\" (1.854 m)   Wt 234 lb 6.4 oz (106.3 kg)   SpO2 97%   BMI 30.93 kg/m²      PHQ Scores 6/2/2022 10/28/2021 2/19/2021

## 2022-10-03 RX ORDER — POTASSIUM CHLORIDE 750 MG/1
10 TABLET, EXTENDED RELEASE ORAL DAILY
Qty: 90 TABLET | Refills: 3 | Status: SHIPPED | OUTPATIENT
Start: 2022-10-03

## 2022-10-07 ENCOUNTER — NURSE ONLY (OUTPATIENT)
Dept: INTERNAL MEDICINE CLINIC | Age: 87
End: 2022-10-07
Payer: MEDICARE

## 2022-10-07 VITALS — TEMPERATURE: 97 F

## 2022-10-07 DIAGNOSIS — Z23 NEED FOR INFLUENZA VACCINATION: Primary | ICD-10-CM

## 2022-10-07 PROCEDURE — 90694 VACC AIIV4 NO PRSRV 0.5ML IM: CPT | Performed by: NURSE PRACTITIONER

## 2022-10-07 PROCEDURE — G0008 ADMIN INFLUENZA VIRUS VAC: HCPCS | Performed by: NURSE PRACTITIONER

## 2022-11-01 NOTE — TELEPHONE ENCOUNTER
Received refill request for Amlodipine from  Katerin Haywood.     Last ov: 05/12/2022 DCE    Last Refill: 11/08/2021 #90 w/ 3 refills    Next appointment: 11/17/2023 DCE

## 2022-11-08 PROBLEM — A41.9 SEPSIS (HCC): Status: ACTIVE | Noted: 2022-01-01

## 2022-11-08 NOTE — TELEPHONE ENCOUNTER
Received call through NT line. Emily Khan states pt is having increased weakness now making it hard to get around. He is coughing, and he has been breathing faster and harder than normal.  She states she is concerned he may have pneumonia. No VV appointments available today. Informed patient I would call her back with next step. Ok to wait for VV tomorrow, in office appt tomorrow, or ED today?

## 2022-11-08 NOTE — H&P
HOSPITALISTS HISTORY AND PHYSICAL    11/8/2022 4:33 PM    Patient Information:  Giovanna Sandhu is a 80 y.o. male [id number]  PCP:  FAIZAN Jean CNP (Tel: [phone] )    Chief complaint:    Chief Complaint   Patient presents with    Illness     Wife concerned for \"illness\" with , PCP requesting xray. Cough     X3 days         History of Present Illness:  Mabel Renee is a 80 y.o. male who presented with fevers cough onset of symptoms 3 days ago. Patient wife with similar symptoms of viral URI. Patient has 5 just having fever of 101. Patient with history of CLL. Denies any nausea vomiting this generalized fatigue weakness cough nausea significant chest pain or shortness of breath. Nothing that makes it better or worse. REVIEW OF SYSTEMS:   Constitutional: Negative for fever,chills or night sweats  ENT: Negative for rhinorrhea, epistaxis, hoarseness, sore throat. Respiratory: Negative for shortness of breath,wheezing  Cardiovascular: Negative for chest pain, palpitations   Gastrointestinal: Negative for nausea, vomiting, diarrhea  Genitourinary: Negative for polyuria, dysuria   Hematologic/Lymphatic: Negative for bleeding tendency, easy bruising  Musculoskeletal: Negative for myalgias and arthralgias  Neurologic: Negative for confusion,dysarthria. Skin: Negative for itching,rash, good capillary refill. Psychiatric: Negative for depression,anxiety, agitation. Endocrine: Negative for polydipsia,polyuria,heat /cold intolerance. Past Medical History:   has a past medical history of Bilateral cataracts, Bladder stones, Blepharitis, Cancer ([address] Hypertension, Kidney stones, Obesity, Pneumonia, and SOB (shortness of breath). Past Surgical History:   has a past surgical history that includes Skin cancer excision; Cholecystectomy; Prostatectomy; Colonoscopy;  Cystocopy (8/13/12); other surgical history (8/25/12); and eye surgery. Medications:  No current facility-administered medications on file prior to encounter. Current Outpatient Medications on File Prior to Encounter   Medication Sig Dispense Refill    amLODIPine (NORVASC) 10 MG tablet TAKE 1 TABLET BY MOUTH DAILY 90 tablet 3    torsemide (DEMADEX) 20 MG tablet TAKE 1 TABLET BY MOUTH DAILY 30 tablet 5    potassium chloride (KLOR-CON M) 10 MEQ extended release tablet TAKE 1 TABLET BY MOUTH DAILY 90 tablet 3    donepezil (ARICEPT) 10 MG tablet Take 1 tablet by mouth nightly 90 tablet 3    primidone (MYSOLINE) 50 MG tablet TAKE 1 TABLET BY MOUTH DAILY 90 tablet 3    levothyroxine (SYNTHROID) 100 MCG tablet Take 1 tablet by mouth Daily 90 tablet 3    propranolol (INDERAL) 20 MG tablet TAKE 1 TABLET BY MOUTH DAILY 90 tablet 3    allopurinol (ZYLOPRIM) 100 MG tablet Take 100 mg by mouth daily      ibrutinib (IMBRUVICA) 420 MG tablet Take 420 mg by mouth daily      Calcium Carb-Cholecalciferol (CALCIUM/VITAMIN D PO) Take by mouth 1200 mg qd      multivitamin (ANTIOXIDANT;PROSIGHT) TABS per tablet Take 1 tablet by mouth daily. Allergies: Allergies   Allergen Reactions    Oxycodone-Acetaminophen Other (See Comments)     halucinations  Other reaction(s): hallucinations    Adhesive Tape      Skin becomes raw        Social History:   reports that he quit smoking about 38 years ago. He has a 38.00 pack-year smoking history. He has never used smokeless tobacco. He reports that he does not drink alcohol and does not use drugs. Family History:  family history includes Bleeding Prob (age of onset: 47) in his father; Dementia in his mother; High Blood Pressure in his mother; High Cholesterol in his mother; Other (age of onset: [de-identified]) in his mother. ,    Physical Exam:  BP (!) 136/59   Pulse 70   Temp 98.5 °F (36.9 °C)   Resp 18   Wt 223 lb (101.2 kg)   SpO2 94%   BMI 29.42 kg/m²     General appearance:  Appears comfortable.  Well nourished  Eyes: Sclera clear, pupils equal  ENT: Moist mucus membranes, no thrush. Trachea midline. Cardiovascular: Regular rhythm, normal S1, S2. No murmur, gallop, rub. No edema in lower extremities  Respiratory: Clear to auscultation bilaterally, no wheeze, good inspiratory effort  Gastrointestinal: Abdomen soft, non-tender, not distended, normal bowel sounds  Musculoskeletal: No cyanosis in digits, neck supple  Neurology: Cranial nerves grossly intact. Alert and oriented in time, place and person. No speech or motor deficits  Psychiatry: Appropriate affect. Not agitated  Skin: Warm, dry, normal turgor, no rash    Labs:  CBC:   Lab Results   Component Value Date/Time    WBC 17.5 11/08/2022 02:57 PM    RBC 4.61 11/08/2022 02:57 PM    HGB 12.5 11/08/2022 02:57 PM    HCT 40.3 11/08/2022 02:57 PM    MCV 87.5 11/08/2022 02:57 PM    MCH 27.2 11/08/2022 02:57 PM    MCHC 31.1 11/08/2022 02:57 PM    RDW 18.3 11/08/2022 02:57 PM     11/08/2022 02:57 PM    MPV 8.7 11/08/2022 02:57 PM     BMP:    Lab Results   Component Value Date/Time     11/08/2022 02:57 PM    K 4.8 11/08/2022 02:57 PM     11/08/2022 02:57 PM    CO2 15 11/08/2022 02:57 PM    BUN 39 11/08/2022 02:57 PM    CREATININE 1.8 11/08/2022 02:57 PM    CALCIUM 8.8 11/08/2022 02:57 PM    GFRAA 53 06/02/2022 02:35 PM    GFRAA >60 01/22/2013 09:16 AM    LABGLOM 36 11/08/2022 02:57 PM    GLUCOSE 112 11/08/2022 02:57 PM       Chest Xray:   EKG:    I visualized CXR images and EKG strips     Discussed  with      Problem List  Principal Problem:    Sepsis ([address]  Resolved Problems:    * No resolved hospital problems. *        Assessment/Plan:     Sepsis  -Given CLL history patient has been started on broad-spectrum antibiotics  -Although symptoms likely are viral in etiology influenza is negative COVID has been ordered  -Continue IV antibiotics for now until cultures are negative  -Supportive care with IV fluid. Acute kidney injury  -Creatinine is 1.8 close to baseline.   Continue IV fluid monitor closely      CLL with leukocytosis      Gema Cotto MD    11/8/2022 4:33 PM

## 2022-11-08 NOTE — ED PROVIDER NOTES
EMERGENCY DEPARTMENT PROVIDER NOTE    Patient Identification  Pt Name: Sterling Manzo  MRN: [id number]  [place] 1934  Date of evaluation: 11/8/2022  Provider: Barbara Tabares DO  PCP: FAIZAN Ramos CNP    Chief Complaint  Illness (Wife concerned for \"illness\" with , PCP requesting xray. ) and Cough (X3 days )      HPI  (History provided by patient)  This is a 80 y.o. male with pertinent past medical history of CLL, former tobacco use who was brought in by family for shortness of breath. Patient states he generally feels well, however spouse states the patient has been ill for the past 5 days. Associate with fever with a high of 101 a couple days ago. He is also had a nonproductive cough and has appeared short of breath. Nothing seems to clearly make the symptoms any better or worse. Patient was seen at an urgent care prior to arrival to the emergency department, he was instructed to come to the emergency department to receive chest imaging. Spouse states she was also ill with fever and cough last week however recovered.     ROS    Patient denies symptomatic complaints although he appears short of breath    Const:  No fevers, no chills, no generalized weakness  Skin:  No rash, no lesions  Eyes:  No visual changes, no blurry or double vision, no pain  ENT:  No sore throat, no difficulty swallowing, no ear pain, no sinus pain or congestion  Card:  No chest pain, no palpitations, no edema  Resp:  No shortness of breath, no cough, no wheezing  Abd:  No abdominal pain, no nausea, no vomiting, no diarrhea  Genitourinary:  No dysuria, no hematuria  MSK:  No joint pain, no myalgia  Neuro:  No focal weakness, no headache, no paresthesia    All other systems reviewed and negative unless otherwise noted in HPI      I have reviewed the following nursing documentation:  Allergies: Oxycodone-acetaminophen and Adhesive tape    Past medical history:   Past Medical History:   Diagnosis Date Exam  ED Triage Vitals [11/08/22 1332]   BP Temp Temp src Heart Rate Resp SpO2 Height Weight   131/69 98.5 °F (36.9 °C) -- 66 17 93 % -- 223 lb (101.2 kg)     Nursing note and vitals reviewed. Constitutional: Well developed, well nourished. Ill-appearing  HENT:      Head: Normocephalic and atraumatic. Ears: External ears normal.      Nose: Nose normal.     Mouth: Membrane mucosa dry and pink. Eyes: Anicteric sclera. No discharge. Neck: Supple. Trachea midline. Cardiovascular: RRR; no murmurs, rubs, or gallops. Pulmonary/Chest: Tachypnea, mild respiratory distress, speaks in 3-4 word phrases with oxygen saturation 88% on room air while at rest.  Scattered rhonchi with faint end expiratory wheezes. No stridor. No rales. Abdominal: Soft. No distension. Nontender to deep palpation all quadrants. Musculoskeletal: Moves all extremities. No gross deformity. Neurological: Alert and orientedx4. Face symmetric. Speech is clear. Skin: Warm and dry. No rash. Procedures      EKG    EKG was reviewed by emergency department physician in the absence of a cardiologist    Wide complex sinus rhythm, rate 75, normal axis, prolonged CT and wide QRS intervals, normal Qtc, no ST elevations or depressions, TWI V2, impression atrial flutter with nonspecific T wave morphology and RBBB, no STEMI      Radiology  XR CHEST PORTABLE   Final Result   Findings consistent with bilateral mid-basilar pneumonia worse on the right   and/or congestive heart failure.  3-3.5 cm mass in the peripheral right mid   lung field should also be considered. Chest CT and/or follow-up to   resolution recommended.          CT CHEST WO CONTRAST    (Results Pending)       Labs  Results for orders placed or performed during the hospital encounter of 11/08/22   Rapid influenza A/B antigens    Specimen: Nasopharyngeal   Result Value Ref Range    Rapid Influenza A Ag Negative Negative    Rapid Influenza B Ag Negative Negative   CBC with Auto Differential   Result Value Ref Range    WBC 17.5 (H) 4.0 - 11.0 K/uL    RBC 4.61 4.20 - 5.90 M/uL    Hemoglobin 12.5 (L) 13.5 - 17.5 g/dL    Hematocrit 40.3 (L) 40.5 - 52.5 %    MCV 87.5 80.0 - 100.0 fL    MCH 27.2 26.0 - 34.0 pg    MCHC 31.1 31.0 - 36.0 g/dL    RDW 18.3 (H) 12.4 - 15.4 %    Platelets 079 361 - 673 K/uL    MPV 8.7 5.0 - 10.5 fL   CMP w/ Reflex to MG   Result Value Ref Range    Sodium 130 (L) 136 - 145 mmol/L    Potassium reflex Magnesium 4.8 3.5 - 5.1 mmol/L    Chloride 101 99 - 110 mmol/L    CO2 15 (L) 21 - 32 mmol/L    Anion Gap 14 3 - 16    Glucose 112 (H) 70 - 99 mg/dL    BUN 39 (H) 7 - 20 mg/dL    Creatinine 1.8 (H) 0.8 - 1.3 mg/dL    Est, Glom Filt Rate 36 (A) >60    Calcium 8.8 8.3 - 10.6 mg/dL    Total Protein 6.9 6.4 - 8.2 g/dL    Albumin 3.4 3.4 - 5.0 g/dL    Albumin/Globulin Ratio 1.0 (L) 1.1 - 2.2    Total Bilirubin 0.6 0.0 - 1.0 mg/dL    Alkaline Phosphatase 37 (L) 40 - 129 U/L    ALT 33 10 - 40 U/L     (H) 15 - 37 U/L   Troponin   Result Value Ref Range    Troponin 0.01 <0.01 ng/mL   Brain Natriuretic Peptide   Result Value Ref Range    Pro- (H) 0 - 449 pg/mL   Procalcitonin   Result Value Ref Range    Procalcitonin 0.16 (H) 0.00 - 0.15 ng/mL   Lactate, Sepsis   Result Value Ref Range    Lactic Acid, Sepsis 1.3 0.4 - 1.9 mmol/L   EKG 12 Lead   Result Value Ref Range    Ventricular Rate 75 BPM    Atrial Rate 234 BPM    QRS Duration 144 ms    Q-T Interval 432 ms    QTc Calculation (Bazett) 482 ms    P Axis 82 degrees    R Axis -53 degrees    T Axis 33 degrees    Diagnosis       Atrial flutter with variable A-V blockRight bundle branch blockLeft anterior fascicular block Bifascicular block Moderate voltage criteria for LVH, may be normal variant       Screenings   [place] Coma Scale  Eye Opening: Spontaneous  Best Verbal Response: Oriented  Best Motor Response: Obeys commands  [place] Coma Scale Score: 15       Is this patient to be included in the SEP-1 Core Measure due to severe sepsis or septic shock? No   Exclusion criteria - the patient is NOT to be included for SEP-1 Core Measure due to:  May have criteria for sepsis, but does not meet criteria for severe sepsis or septic shock      MDM and ED Course    Patient afebrile however ill-appearing. He demonstrates mild respiratory distress on my arrival to room with hypoxia, hypoxia was corrected with supplemental oxygen by nasal cannula after which patient's work of breathing improved. He is alert and protecting his airway. EKG with suspected atrial flutter, no STEMI, troponin normal, no chest pain, very low suspicion for ACS. Heart rate currently controlled, no indication for emergent intervention of atrial flutter. CXR with multifocal pneumonia. Pulmonary embolism was considered, however this is not the most likely diagnosis. Laboratory does show leukocytosis, however this may be secondary to patient's CLL. BNP modestly elevated, however clinically appears dry and has mild acute kidney injury and I suspect he is volume depleted. Procalcitonin equivocal.  Influenza negative, COVID-19 obtained and is pending. Patient received IV fluids and breathing treatments. I suspect patient's symptoms are most likely viral in etiology, however given he is immunocompromised on CLL treatment we will also give first dose of cefepime and vancomycin for potential bacterial pneumonia. Patient's lactic acid normal, no hypotension, no indication for aggressive 30 cc/kg IV fluids. Case discussed with internal medicine team and will admit for further evaluation and care. Patient alert, hemodynamically stable and in no distress at time of admission. Of note, CXR read with potential right lung mass. Noncontrast CT imaging was ordered to better evaluate this and finding discussed with internal medicine team.    I Dr. Zahra Lagos am the primary clinician of record.     Critical Care Time    Upon my evaluation, this patient had a high probability of imminent or life-threatening deterioration due to sepsis, acute hypoxic respiratory failure which required my direct attention, intervention, and personal management. The total critical care time personally spent while evaluating and treating this patient was 34 minutes exclusive of any time spent doing separately billable procedures. This includes time at the bedside, data interpretation, medication management, monitoring for potential decompensation and physician consultation. Specifics of interventions taken and potentially life-threatening diagnostic considerations are listed above in the medical decision making. Final Impression  1. Septicemia ([address]    2. Community acquired pneumonia, unspecified laterality    3. Acute respiratory failure with hypoxia (HCC)    4. Abnormal CXR    5. History of chronic lymphocytic leukemia    6. Atrial flutter by electrocardiogram ([facility])        Blood pressure 116/64, pulse 74, temperature 98.5 °F (36.9 °C), resp. rate 26, weight 223 lb (101.2 kg), SpO2 94 %. Disposition:  DISPOSITION Admitted 11/08/2022 04:33:41 PM      Patient Referrals:  No follow-up provider specified. Discharge Medications:  New Prescriptions    No medications on file       Discontinued Medications:  Discontinued Medications    No medications on file       This chart was generated using the [address] [place] [address] dictation system. I created this record but it may contain dictation errors given the limitations of this technology.     Toney Stevenson DO (electronically signed)  Attending Emergency Physician       Toney Stevenson DO  11/08/22 1329

## 2022-11-08 NOTE — TELEPHONE ENCOUNTER
Wife already messaged me on her mychart saying he was breathing fast.  I recommended ER. I suggested he do a COVID test yesterday at her appointment. Not sure if that was done. If they do not think he needs ER, and his COVID test is negative, he can be scheduled with one who has availability today or with me tomorrow (again, as long as she feels he is safe to wait). I am concerned if he is \"breathing fast\" he may need urgent evaluation.         Kaylee Rodriguez P  to Me    MS    7:40 AM  Trae sounds like he is breathing fast.   Where do we go for a Xray of his chest?  My phone  [facility]  Me  to Lex Lefort JU    7:48 AM  If he is having a hard time breathing, go to ER

## 2022-11-08 NOTE — ED NOTES
Report given to Pedro Ventura RN, states no questions or concerns at this time. Pt transported to floor via bed by floor RN. Pts wife assisted to her car at this time.       [address], RN  11/08/22 4268

## 2022-11-09 NOTE — CONSULTS
MD Zion Morgan MD Deland Arlington, MD                  Office: [phone]                      Fax: [phone]             [address] [place] [address]                   NEPHROLOGY INITIAL CONSULT NOTE:     PATIENT NAME: Rohini Argueta  : 1934  MRN: [id number]  REASON FOR CONSULT: For evaluation and management of Acute Kidney Injury. (My recommendations will be communicated by way of shared medical record.)  Requested directly by Dr MARTÍN Jensen       RECOMMENDATIONS:   - hold Torsemide today  -  cc/hr, decrease to 75 cc/hr  - check serum uric acid  -f/up w/ hem-onc   - IV antibiotic: Vancomycin: trough goal <15, pharmacy team assisting.   - check UA w/ microscopy, urine lytes,  - monitor PVR w/ bladder scan for willis insertion need. - at higher risk for decompensation, needing closer monitoring.     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):   - BL Scr- lowest recently 1.4-1.5, as off 22   ->  1.8 on admission  - Etiology of ROSIE - presumed pre-renal now    - other differentials: unlikely  GN / TI / TMA process  - UA : needs it  - Renal imaging: on : 3-2021 w IV dye CT - Tiny nonobstructing right renal stone      Associated problems:   - Volume status: mild hypo-volemic  : HTN : no need for tight control    : Na: hyponatremia - mild 130 on admission, no recent h/o hypoNa, so likely acute, so should be ok for acute reversal     - Azotemia: pre-renal   - Electrolytes: K: WNL  - Acid-Base: acidosis, non-AGMA 15 on admission, LA WNL  - Anemia: of chronic disease        Other major problems: Management per primary and other consulting teams. H/O CLL     - CT chest -   Consolidation in the mid to lower lungs is compatible with multifocal pneumonia. Suspected right hilar lymphadenopathy is likely reactive. Suggest follow-up contrast-enhanced chest CT within 3 months to assess for resolution. -- would need fup w/ PCP      Hospital Problems             Last Modified POA    * (Principal) Sepsis ([facility] 11/8/2022 Yes         : other supportive care :   - Check daily renal function panel with electrolytes-phosphorus  - Strict monitoring of I/Os, daily weight  - Renal feeds/diet  - Current medications reviewed. - Nephrotoxic medications have been discontinued. - Dose adjusted and appropriate. - Dose meds for eGFR <15 mL/min/1.73m2 during ROSIE    - Avoid heavy opioids due to renal failure - may use very low dose dilaudid / fentanyl with close monitoring of CNS and respiratory depression. Please refer to the orders. High Complexity. Multiple complex problems. Discussed with patient and treatment team-    Time spent > 30 (~35) minutes. Thank you for allowing me to participate in this patient's care. Please do not hesitate to contact me anytime. We will follow along with you. Norma Helms MD,  [facility] [address] [place] [address] [place]: [phone] or [employer]  Fax: [phone]        =======================================================================================   =======================================================================================      CHIEF COMPLAINT:   Chief Complaint   Patient presents with    Illness     Wife concerned for \"illness\" with , PCP requesting xray.      Cough     X3 days      History Obtained From:  patient, spouse + treatment team + Electronic Medical Records    HPI: Mr. Zelalem Valle is a 80 y.o. male with significant past medical history as below:   Past Medical History:   Diagnosis Date    Bilateral cataracts     Bladder stones     Blepharitis     Cancer ([facility]     prostate    Hypertension     Kidney stones     with bladder stones    Obesity     Pneumonia     SOB (shortness of breath)       Presents with Illness (Wife concerned for \"illness\" with , PCP requesting xray. ) and Cough (X3 days )    Admitted with Septicemia ([facility] [A41.9]  Abnormal CXR [R93.89]  Acute respiratory failure with hypoxia ([facility] [J96.01]  Atrial flutter by electrocardiogram ([facility] [I48.92]  Sepsis ([facility] [A41.9]  History of chronic lymphocytic leukemia [Z85.6]  Community acquired pneumonia, unspecified laterality [J18.9]   Found to have elevated Cr , so we are called for that. No current active complaints. Patient denied chest pain / dizziness/lightheadedness/syncope/ SOB / leg edema. *  Regarding: ROSIE on CKD  Duration: acute on chronic  Location: kidneys  Severity: Severe   Timing: continous  Context (ex: related to condition):  , refer to my assessment / impression. Modifying factors (ex: medications, interventions):  , refer to my plan / recommendation. Associated signs & symptoms (ex: edema, SOB): As mentioned above in CC and HPI      Past medical, Surgical, Social, Family medical history reviewed by me.      PAST MEDICAL HISTORY:   Past Medical History:   Diagnosis Date    Bilateral cataracts     Bladder stones     Blepharitis     Cancer ([facility]     prostate    Hypertension     Kidney stones     with bladder stones    Obesity     Pneumonia     SOB (shortness of breath)      PAST SURGICAL HISTORY:   Past Surgical History:   Procedure Laterality Date    CHOLECYSTECTOMY      '98     COLONOSCOPY      polypectomy    CYSTOSCOPY  8/13/12    left retrograde    EYE SURGERY      OTHER SURGICAL HISTORY  8/25/12    left ureteral lithotomy    PROSTATECTOMY      with abd panniculectomy '98    SKIN CANCER EXCISION      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     FAMILY HISTORY: Family History   Problem Relation Age of Onset    Dementia Mother     Other Mother [de-identified]        pneumonia    High Blood Pressure Mother     High Cholesterol Mother     Bleeding Prob Father 47        blood poisoning    Heart Disease Neg Hx      SOCIAL HISTORY:   Social History     Socioeconomic History    Marital status:      Spouse name: None    Number of children: None    Years of education: None    Highest education level: None   Tobacco Use    Smoking status: Former     Packs/day: 1.00     Years: 38.00     Pack years: 38.00     Types: Cigarettes     Quit date: 1984     Years since quittin.4    Smokeless tobacco: Never   Vaping Use    Vaping Use: Never used   Substance and Sexual Activity    Alcohol use: No    Drug use: No     Social Determinants of Health     Financial Resource Strain: Low Risk     Difficulty of Paying Living Expenses: Not hard at all   Food Insecurity: No Food Insecurity    Worried About [employer] in the Last Year: Never true    Ran Out of Food in the Last Year: Never true         MEDICATIONS: reviewed by me. Medications Prior to Admission:  No current facility-administered medications on file prior to encounter.      Current Outpatient Medications on File Prior to Encounter   Medication Sig Dispense Refill    amLODIPine (NORVASC) 10 MG tablet TAKE 1 TABLET BY MOUTH DAILY 90 tablet 3    torsemide (DEMADEX) 20 MG tablet TAKE 1 TABLET BY MOUTH DAILY 30 tablet 5    potassium chloride (KLOR-CON M) 10 MEQ extended release tablet TAKE 1 TABLET BY MOUTH DAILY 90 tablet 3    donepezil (ARICEPT) 10 MG tablet Take 1 tablet by mouth nightly 90 tablet 3    primidone (MYSOLINE) 50 MG tablet TAKE 1 TABLET BY MOUTH DAILY 90 tablet 3    levothyroxine (SYNTHROID) 100 MCG tablet Take 1 tablet by mouth Daily 90 tablet 3    propranolol (INDERAL) 20 MG tablet TAKE 1 TABLET BY MOUTH DAILY 90 tablet 3    allopurinol (ZYLOPRIM) 100 MG tablet Take 100 mg by mouth daily      ibrutinib (IMBRUVICA) 420 MG tablet Take 420 mg by mouth daily      Calcium Carb-Cholecalciferol (CALCIUM/VITAMIN D PO) Take by mouth 1200 mg qd      multivitamin (ANTIOXIDANT;PROSIGHT) TABS per tablet Take 1 tablet by mouth daily.            Current Facility-Administered Medications:     vancomycin (VANCOCIN) 750 mg in dextrose 5 % 250 mL IVPB, 750 mg, IntraVENous, Q24H, Devendra Jensen MD    allopurinol (ZYLOPRIM) tablet 100 mg, 100 mg, Oral, Daily, Devendra Jensen MD, 100 mg at 11/09/22 0859    amLODIPine (NORVASC) tablet 10 mg, 10 mg, Oral, Daily, Devendra Jensen MD, 10 mg at 11/09/22 0859    donepezil (ARICEPT) tablet 10 mg, 10 mg, Oral, Nightly, Devendra Jensen MD, 10 mg at 11/08/22 2140    ibrutinib (IMBRUVICA) chemo tablet 420 mg PATIENT SUPPLIED (Patient Supplied), 420 mg, Oral, Nightly, Tod Ovalle MD    levothyroxine (SYNTHROID) tablet 100 mcg, 100 mcg, Oral, Daily, Tod Ovalle MD, 100 mcg at 11/09/22 0630    primidone (MYSOLINE) tablet 50 mg, 50 mg, Oral, Daily, Tod Ovalle MD, 50 mg at 11/09/22 0859    propranolol (INDERAL) tablet 20 mg, 20 mg, Oral, Daily, Tod Ovalle MD, 20 mg at 11/09/22 0859    cefepime (MAXIPIME) 2000 mg IVPB minibag, 2,000 mg, IntraVENous, Q12H, Devendra Reza MD, Stopped at 11/09/22 0532    0.9 % sodium chloride infusion, , IntraVENous, Continuous, Devendra Jensen MD, Last Rate: 150 mL/hr at 11/09/22 0356, New Bag at 11/09/22 0356    sodium chloride flush 0.9 % injection 5-40 mL, 5-40 mL, IntraVENous, 2 times per day, Tod Ovalle MD, 10 mL at 11/09/22 0859    sodium chloride flush 0.9 % injection 5-40 mL, 5-40 mL, IntraVENous, PRN, Devendra Jensen MD    0.9 % sodium chloride infusion, , IntraVENous, PRN, Devendra Jensen MD    enoxaparin Sodium (LOVENOX) injection 30 mg, 30 mg, SubCUTAneous, BID, Devendra Jensen MD, 30 mg at 11/09/22 0859    ondansetron (ZOFRAN-ODT) disintegrating tablet 4 mg, 4 mg, Oral, Q8H PRN **OR** ondansetron (ZOFRAN) injection 4 mg, 4 mg, IntraVENous, Q6H PRN, Devendra Jensen, MD    polyethylene glycol (GLYCOLAX) packet 17 g, 17 g, Oral, Daily PRN, Marianne Caruso MD    acetaminophen (TYLENOL) tablet 650 mg, 650 mg, Oral, Q6H PRN, 650 mg at 11/09/22 0630 **OR** acetaminophen (TYLENOL) suppository 650 mg, 650 mg, Rectal, Q6H PRN, Marianne Caruso MD      Allergies reviewed by me: Oxycodone-acetaminophen and Adhesive tape    REVIEW OF SYSTEMS:  As mentioned in chief complaint and HPI , Subjective   All other 10-point review of systems: negative.            =======================================================================================     PHYSICAL EXAM:  Recent vital signs and recent I/Os reviewed by me.      Wt Readings from Last 3 Encounters:   11/09/22 210 lb (95.3 kg)   09/02/22 234 lb 6.4 oz (106.3 kg)   06/02/22 240 lb (108.9 kg)     BP Readings from Last 3 Encounters:   11/09/22 (!) 118/50   09/02/22 124/70   06/30/22 124/63     Patient Vitals for the past 24 hrs:   BP Temp Temp src Pulse Resp SpO2 Height Weight   11/09/22 0856 (!) 118/50 98.2 °F (36.8 °C) Oral 80 20 93 % -- --   11/09/22 0808 -- -- -- -- -- -- -- 210 lb (95.3 kg)   11/09/22 0630 -- (!) 102 °F (38.9 °C) Oral -- -- -- -- --   11/09/22 0430 (!) 165/76 98.4 °F (36.9 °C) Oral 83 20 91 % -- --   11/09/22 0036 118/66 97.9 °F (36.6 °C) Axillary 72 20 95 % -- --   11/08/22 2152 -- 100.4 °F (38 °C) Oral -- -- -- -- --   11/08/22 1900 122/63 98.5 °F (36.9 °C) Oral 76 20 93 % -- --   11/08/22 1800 123/65 99.2 °F (37.3 °C) Oral 77 22 92 % 6' 2\" (1.88 m) 218 lb 12.8 oz (99.2 kg)   11/08/22 1645 116/64 -- -- 74 26 94 % -- --   11/08/22 1631 128/61 -- -- 77 19 94 % -- --   11/08/22 1615 -- -- -- 70 18 94 % -- --   11/08/22 1600 (!) 120/56 -- -- 68 23 93 % -- --   11/08/22 1538 (!) 136/59 -- -- 63 22 93 % -- --   11/08/22 1332 131/69 98.5 °F (36.9 °C) -- 66 17 93 % -- 223 lb (101.2 kg)       Intake/Output Summary (Last 24 hours) at 11/9/2022 1002  Last data filed at 11/9/2022 0902  Gross per 24 hour   Intake 801.16 ml   Output 625 ml   Net 176.16 ml         Physical Exam  Vitals reviewed. Constitutional:       General: He is not in acute distress. Appearance: Normal appearance. He is ill-appearing. HENT:      Head: Normocephalic and atraumatic. Right Ear: External ear normal.      Left Ear: External ear normal.      Nose: Nose normal.      Mouth/Throat:      Mouth: Mucous membranes are moist. Mucous membranes are not dry. Eyes:      General: No scleral icterus. Conjunctiva/sclera: Conjunctivae normal.   Neck:      Vascular: No JVD. Cardiovascular:      Rate and Rhythm: Normal rate and regular rhythm. Heart sounds: S1 normal and S2 normal.   Pulmonary:      Effort: Pulmonary effort is normal. No respiratory distress. Breath sounds: Rhonchi present. Abdominal:      General: Bowel sounds are normal. There is no distension. Musculoskeletal:         General: Swelling (mild) present. No deformity. Cervical back: Normal range of motion and neck supple. Skin:     General: Skin is dry. Coloration: Skin is not jaundiced. Neurological:      Mental Status: He is alert and oriented to person, place, and time. Mental status is at baseline. Psychiatric:         Mood and Affect: Mood normal.         Behavior: Behavior normal.          =======================================================================================     DATA:  Diagnostic tests reviewed by me for today's visit:   (AS NEEDED FOR MY EVALUATION AND MANAGEMENT).        Recent Labs     11/08/22  1457   WBC 17.5*   HCT 40.3*        Iron Saturation:  No components found for: PERCENTFE  FERRITIN:  No results found for: FERRITIN  IRON:  No results found for: IRON  TIBC:  No results found for: TIBC    Recent Labs     11/08/22  1457 11/09/22  0648   * 140   K 4.8 4.3    107   CO2 15* 17*   BUN 39* 36*   CREATININE 1.8* 1.6*     Recent Labs     11/08/22  1457 11/09/22  0648   CALCIUM 8.8 8.6     No results for input(s): PH, PCO2, PO2 in the last 72 hours.     Invalid input(s): Shaaron Lanes    ABG:  No results found for: PH, PCO2, PO2, HCO3, BE, THGB, TCO2, O2SAT  VBG:  No results found for: PHVEN, ZVG7IMX, BEVEN, Y9YXTSYD    LDH:  No results found for: LDH  Uric Acid:  No results found for: LABURIC, URICACID    PT/INR:    Lab Results   Component Value Date/Time    PROTIME 12.6 06/25/2010 06:30 AM    INR 1.19 06/25/2010 06:30 AM     Warfarin PT/INR:  No components found for: PTPATWAR, PTINRWAR  PTT:    Lab Results   Component Value Date/Time    APTT 34.6 06/25/2010 06:30 AM   [APTT}  Last 3 Troponin:    Lab Results   Component Value Date/Time    TROPONINI 0.01 11/08/2022 02:57 PM       U/A:    Lab Results   Component Value Date/Time    NITRITE neg 02/01/2018 03:01 PM    COLORU YELLOW 04/06/2016 11:26 AM    PROTEINU neg 02/01/2018 03:01 PM    PROTEINU TRACE 04/06/2016 11:26 AM    PHUR 5.0 02/01/2018 03:01 PM    PHUR 8.5 04/06/2016 11:26 AM    WBCUA 32 04/06/2016 11:26 AM    RBCUA 7 04/06/2016 11:26 AM    BACTERIA 4+ 04/06/2016 11:26 AM    CLARITYU CLOUDY 04/06/2016 11:26 AM    SPECGRAV 1.025 02/01/2018 03:01 PM    SPECGRAV 1.010 04/06/2016 11:26 AM    LEUKOCYTESUR neg 02/01/2018 03:01 PM    LEUKOCYTESUR LARGE 04/06/2016 11:26 AM    UROBILINOGEN 0.2 04/06/2016 11:26 AM    BILIRUBINUR neg 02/01/2018 03:01 PM    BLOODU neg 02/01/2018 03:01 PM    BLOODU MODERATE 04/06/2016 11:26 AM    GLUCOSEU neg 02/01/2018 03:01 PM    GLUCOSEU Negative 04/06/2016 11:26 AM     Microalbumen/Creatinine ratio:  No components found for: RUCREAT  24 Hour Urine for Protein:  No components found for: RAWUPRO, UHRS3, PRQW39IC, UTV3  24 Hour Urine for Creatinine Clearance:  No components found for: CREAT4, UHRS10, UTV10  Urine Toxicology:  No components found for: IAMMENTA, IBARBIT, IBENZO, ICOCAINE, IMARTHC, IOPIATES, IPHENCYC    HgBA1c:  No results found for: LABA1C  RPR:  No results found for: RPR  HIV:  No results found for: HIV  [place]:  No results found for: ANATITER, [place]  RF:  No results found for: RF  DSDNA:  No components found for: DNA  AMYLASE:  No results found for: AMYLASE  LIPASE:  No results found for: LIPASE  Fibrinogen Level:  No components found for: FIB       BELOW MENTIONED RADIOLOGY STUDY RESULTS BY ME (AS NEEDED FOR MY EVALUATION AND MANAGEMENT). CT CHEST WO CONTRAST    Result Date: 11/8/2022  Consolidation in the mid to lower lungs is compatible with multifocal pneumonia. Suspected right hilar lymphadenopathy is likely reactive. Suggest follow-up contrast-enhanced chest CT within 3 months to assess for resolution. This report was transcribed using voice recognition software, mainly. So please excuse brevity and/or typos. Every effort was made to ensure accuracy, however, inadvertent computerized transcription errors may be present. Please contact us, if any questions or clarifications are needed.

## 2022-11-09 NOTE — CONSULTS
Clinical Pharmacy Note: Pharmacy to Dose Vancomycin    Melva Timmons is a 80 y.o. male started on Vancomycin for Sepsis; consult received from Dr. Moises Warner to manage therapy. Also receiving the following antibiotics: Cefepime. Goal AUC: 400-600 mg/L*hr  Goal Trough Level: 15-20 mcg/mL    Assessment/Plan:  A 1500 mg loading dose was given on 11/8 at 1855  Initiate vancomycin 1000 mg IV every 24 hours. Bayesian modeling predicts an AUC of 521 mg/L*hr and a trough of 17.6 mcg/mL at steady state concentration. A vancomycin random level has been ordered for 11/9 at 0600  Changes in regimen will be determined based on culture results, renal function, and clinical response. Pharmacy will continue to monitor and adjust regimen as necessary. Allergies:  Oxycodone-acetaminophen and Adhesive tape     Recent Labs     11/08/22  1457   CREATININE 1.8*       Recent Labs     11/08/22  1457   WBC 17.5*       Ht Readings from Last 1 Encounters:   11/08/22 6' 2\" (1.88 m)        Wt Readings from Last 1 Encounters:   11/08/22 218 lb 12.8 oz (99.2 kg)         Estimated Creatinine Clearance: 36 mL/min (A) (based on SCr of 1.8 mg/dL (H)).       Thank you for the consult,    Michaela BrooksD

## 2022-11-09 NOTE — PROGRESS NOTES
RT called to evaluate pt for c/o SOB, 6LNC = 93%, RR 18-20 BS diminished , pt insistent he doesn't feel SOB. This reported to unit secretary as RN was off unit, Charge RN present to this as well.

## 2022-11-09 NOTE — PROGRESS NOTES
Clinical Pharmacy Note: Pharmacy to Dose Vancomycin    Vancomycin Day: 2  Indication: sepsis   Current Dose: 1000 mg IV every 24 hours  Dosing Method: Bayesian Modeling    Random: 13.8    Recent Labs     11/08/22  1457 11/09/22  0648   BUN 39* 36*       Recent Labs     11/08/22  1457 11/09/22  0648   CREATININE 1.8* 1.6*       Recent Labs     11/08/22  1457   WBC 17.5*         Intake/Output Summary (Last 24 hours) at 11/9/2022 0814  Last data filed at 11/9/2022 7445  Gross per 24 hour   Intake 561.16 ml   Output 625 ml   Net -63.84 ml         Ht Readings from Last 1 Encounters:   11/08/22 6' 2\" (1.88 m)        Wt Readings from Last 1 Encounters:   11/09/22 210 lb (95.3 kg)         Body mass index is 26.96 kg/m². Estimated Creatinine Clearance: 37 mL/min (A) (based on SCr of 1.6 mg/dL (H)). Assessment/Plan:  Vancomycin level is therapeutic but with high risk for accumulation/toxicity. Decrease vancomycin regimen to 750 mg IV  every 24 hours. Bayesian Modeling predicts an AUC of 415 mg/L*hr and trough of 13.5 mg/L. A vancomycin random level has been ordered on 11/10 at 0600 for follow-up. Changes in regimen will be determined based on culture results, renal function, and clinical response. Pharmacy will continue to monitor and adjust regimen as necessary.     Thank you for the consult,    Dereje Willis, PharmD, [facility] Pharmacist  [id number]

## 2022-11-09 NOTE — FLOWSHEET NOTE
Admitted into [phone]. Droplet isolation in place for COVID rule out. Vitals stable see below. Most of admission documentation completed. Oncoming RN aware of what is left to complete.         11/08/22 1800   Vitals   Temp 99.2 °F (37.3 °C)   Temp Source Oral   Heart Rate 77   Heart Rate Source Monitor   Resp 22   /65   MAP (Calculated) 84.33   BP Location Left upper arm   BP Upper/Lower Upper   BP Method Automatic   Patient Position Semi fowlers   Level of Consciousness 0   MEWS Score 2   Pain Assessment   Pain Assessment 0-10   Pain Level 1   Patient's Stated Pain Goal 0 - No pain   Pain Location Back   Pain Orientation Lower   Pain Descriptors Aching   Functional Pain Assessment Activities are not prevented   Pain Type Chronic pain   Pain Radiating Towards Lower   Pain Frequency Continuous   Pain Onset On-going   Non-Pharmaceutical Pain Intervention(s) Repositioned   Opioid-Induced Sedation   POSS Score 1   RASS   Meyers Agitation Sedation Scale (RASS) 0   Oxygen Therapy   SpO2 92 %   Pulse Oximetry Type Intermittent   O2 Device Nasal cannula   O2 Flow Rate (L/min) 2 L/min   Height and Weight   Height 6' 2\" (1.88 m)   Weight 218 lb 12.8 oz (99.2 kg)   Weight Method Bedside scale   BSA (Calculated - sq m) 2.28 sq meters   BMI (Calculated) 28.2

## 2022-11-09 NOTE — CONSULTS
[facility]   CONSULT NOTE    11/9/2022 8:56 AM    Patient Information: Mary DANIELS   Date of Admit:  11/8/2022  Primary Care Physician:  FAIZAN Maldonado - CNP  Requesting Physician:  Padma Orosco MD    Reason for consult:   Evaluation and recommendations for CLL. Chief complaint:    Chief Complaint   Patient presents with    Illness     Wife concerned for \"illness\" with , PCP requesting xray. Cough     X3 days        History of Present Illness:  Mary DANIELS is a 80 y.o. male on Padma Orosco MD service who was admitted on 11/8/2022 for sepsis. He presented to the ER with fevers and cough for the past three days. CT chest done in the ER showed multifocal pneumonia. He is a patient of Dr. Micha Mccartney who has a history of CLL, currently on Ibrutinib 420mg daily that was started in April, 2021. His counts have remained stable with this. Past Medical History:     has a past medical history of Bilateral cataracts, Bladder stones, Blepharitis, Cancer ([address] Hypertension, Kidney stones, Obesity, Pneumonia, and SOB (shortness of breath).      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        Current Medications:     vancomycin  750 mg IntraVENous Q24H    allopurinol  100 mg Oral Daily    amLODIPine  10 mg Oral Daily    donepezil  10 mg Oral Nightly    ibrutinib  420 mg Oral Nightly    levothyroxine  100 mcg Oral Daily    primidone  50 mg Oral Daily    propranolol  20 mg Oral Daily    cefepime  2,000 mg IntraVENous Q12H    sodium chloride flush  5-40 mL IntraVENous 2 times per day    enoxaparin  30 mg SubCUTAneous BID       Allergies: Allergies   Allergen Reactions    Oxycodone-Acetaminophen Other (See Comments)     halucinations  Other reaction(s): hallucinations    Adhesive Tape      Skin becomes raw        Social History:    reports that he quit smoking about 38 years ago. He has a 38.00 pack-year smoking history. He has never used smokeless tobacco. He reports that he does not drink alcohol and does not use drugs. Family History:     family history includes Bleeding Prob (age of onset: 47) in his father; Dementia in his mother; High Blood Pressure in his mother; High Cholesterol in his mother; Other (age of onset: [de-identified]) in his mother. ROS:      Per HPI; otherwise 10 point ROS is negative    PHYSICAL EXAM:    Vitals:  Vitals:    11/09/22 0630   BP:    Pulse:    Resp:    Temp: (!) 102 °F (38.9 °C)   SpO2:         Intake/Output Summary (Last 24 hours) at 11/9/2022 0856  Last data filed at 11/9/2022 0044  Gross per 24 hour   Intake 561.16 ml   Output 625 ml   Net -63.84 ml      Wt Readings from Last 3 Encounters:   11/09/22 210 lb (95.3 kg)   09/02/22 234 lb 6.4 oz (106.3 kg)   06/02/22 240 lb (108.9 kg)        General appearance: Appears comfortable. On supplemental oxygen. Eyes: Sclera clear, pupils equal  ENT: Moist mucus membranes, no thrush  Neck: Trachea midline, symmetrical  Cardiovascular: Regular rhythm, normal S1, S2. No murmur, gallop, rub. Respiratory: Clear to auscultation bilaterally. No wheeze. Gastrointestinal: Abdomen soft, not tender, not distended, normal bowel sounds  Musculoskeletal: No cyanosis in digits, warm extremities  Neurologic: Cranial nerves grossly intact, no motor or speech deficits. Psychiatric: Normal affect. Alert and oriented to time, place and person.   Skin: Warm, dry, normal turgor, no rash    DATA:  CBC:   Lab Results   Component Value Date/Time    WBC 17.5 11/08/2022 02:57 PM    RBC 4.61 11/08/2022 02:57 PM    HGB 12.5 11/08/2022 02:57 PM    HCT 40.3 11/08/2022 02:57 PM    MCV 87.5 11/08/2022 02:57 PM    MCH 27.2 11/08/2022 02:57 PM    MCHC 31.1 11/08/2022 02:57 PM    RDW 18.3 11/08/2022 02:57 PM     11/08/2022 02:57 PM    MPV 8.7 11/08/2022 02:57 PM     BMP:  Lab Results   Component Value Date/Time     11/09/2022 06:48 AM    K 4.3 11/09/2022 06:48 AM    K 4.8 11/08/2022 02:57 PM     11/09/2022 06:48 AM    CO2 17 11/09/2022 06:48 AM    BUN 36 11/09/2022 06:48 AM    CREATININE 1.6 11/09/2022 06:48 AM    CALCIUM 8.6 11/09/2022 06:48 AM    GFRAA 53 06/02/2022 02:35 PM    GFRAA >60 01/22/2013 09:16 AM    LABGLOM 41 11/09/2022 06:48 AM    GLUCOSE 100 11/09/2022 06:48 AM     Magnesium: No results found for: MG  LIVER PROFILE:   Recent Labs     11/08/22  1457   *   ALT 33   BILITOT 0.6   ALKPHOS 37*     PT/INR:    Lab Results   Component Value Date/Time    PROTIME 12.6 06/25/2010 06:30 AM    INR 1.19 06/25/2010 06:30 AM       IMPRESSION/RECOMMENDATIONS:    Principal Problem:    Sepsis ([address]  Resolved Problems:    * No resolved hospital problems. *       49-year-old male who has:    CLL  - Diagnosed 2/2021  - Currently on Ibrutinib 420mg daily, okay to continue while inpatient  - Patient's counts have remained stable  - WBC today is 17.5, which is baseline for patient    2. Sepsis  - CT chest showed multifocal pneumonia  - COVID pending  - on broad spectrum antibiotics per primary team      This plan was discussed with the patient and he/she verbalized understanding. Thank you for allowing us to participate in the care of this patient. FAIZAN Be CNP  [facility] [address]  [phone]     Agree with above. Patient has COVID  From CLL perspective, the counts are stable.   COVID management per primary team    Valeriano Marcano MD

## 2022-11-09 NOTE — PROGRESS NOTES
Nephrology Consult received - full consult note to follow. Thank you for allowing us to participate in this patients care. Please do not hesitate to contact, if any questions or concerns. We will follow along with you. Jamil Sy MD  [facility] [address] [place] [address]   [phone] or Via [employer].

## 2022-11-09 NOTE — PLAN OF CARE
Problem: Discharge Planning  Goal: Discharge to home or other facility with appropriate resources  Outcome: Progressing  Flowsheets (Taken 11/9/2022 0902)  Discharge to home or other facility with appropriate resources:   Identify barriers to discharge with patient and caregiver   Identify discharge learning needs (meds, wound care, etc)   Arrange for needed discharge resources and transportation as appropriate     Problem: Pain  Goal: Verbalizes/displays adequate comfort level or baseline comfort level  Outcome: Progressing  Flowsheets (Taken 11/9/2022 0856)  Verbalizes/displays adequate comfort level or baseline comfort level:   Encourage patient to monitor pain and request assistance   Assess pain using appropriate pain scale   Administer analgesics based on type and severity of pain and evaluate response   Implement non-pharmacological measures as appropriate and evaluate response     Problem: Skin/Tissue Integrity  Goal: Absence of new skin breakdown  Description: 1. Monitor for areas of redness and/or skin breakdown  2. Assess vascular access sites hourly  3. Every 4-6 hours minimum:  Change oxygen saturation probe site  4. Every 4-6 hours:  If on nasal continuous positive airway pressure, respiratory therapy assess nares and determine need for appliance change or resting period.   Outcome: Progressing     Problem: ABCDS Injury Assessment  Goal: Absence of physical injury  Outcome: Progressing     Problem: Safety - Adult  Goal: Free from fall injury  Outcome: Progressing

## 2022-11-09 NOTE — PROGRESS NOTES
[facility] PROGRESS NOTE    11/9/2022 12:21 PM        Name: Neo Butler . Admitted: 11/8/2022  Primary Care Provider: FAIZAN Valadez CNP (Tel: [phone])                        Subjective:  . No acute events overnight. Resting well. Pain control. Diet ok. Labs reviewed  Patient still with shortness of breath needing some oxygen.   Still spiking fever    Reviewed interval ancillary notes    Current Medications  vancomycin (VANCOCIN) 750 mg in dextrose 5 % 250 mL IVPB, Q24H  allopurinol (ZYLOPRIM) tablet 100 mg, Daily  amLODIPine (NORVASC) tablet 10 mg, Daily  donepezil (ARICEPT) tablet 10 mg, Nightly  ibrutinib (IMBRUVICA) chemo tablet 420 mg PATIENT SUPPLIED (Patient Supplied), Nightly  levothyroxine (SYNTHROID) tablet 100 mcg, Daily  primidone (MYSOLINE) tablet 50 mg, Daily  propranolol (INDERAL) tablet 20 mg, Daily  cefepime (MAXIPIME) 2000 mg IVPB minibag, Q12H  0.9 % sodium chloride infusion, Continuous  sodium chloride flush 0.9 % injection 5-40 mL, 2 times per day  sodium chloride flush 0.9 % injection 5-40 mL, PRN  0.9 % sodium chloride infusion, PRN  enoxaparin Sodium (LOVENOX) injection 30 mg, BID  ondansetron (ZOFRAN-ODT) disintegrating tablet 4 mg, Q8H PRN   Or  ondansetron (ZOFRAN) injection 4 mg, Q6H PRN  polyethylene glycol (GLYCOLAX) packet 17 g, Daily PRN  acetaminophen (TYLENOL) tablet 650 mg, Q6H PRN   Or  acetaminophen (TYLENOL) suppository 650 mg, Q6H PRN        Objective:  /61   Pulse 71   Temp 98.2 °F (36.8 °C) (Oral)   Resp 18   Ht 6' 2\" (1.88 m)   Wt 210 lb (95.3 kg)   SpO2 96%   BMI 26.96 kg/m²     Intake/Output Summary (Last 24 hours) at 11/9/2022 1221  Last data filed at 11/9/2022 0902  Gross per 24 hour   Intake 801.16 ml   Output 625 ml   Net 176.16 ml      Wt Readings from Last 3 Encounters:   11/09/22 210 lb (95.3 kg)   09/02/22 234 lb 6.4 oz (106.3 kg)   06/02/22 240 lb (108.9 kg)       General appearance:  Appears comfortable  Eyes: Sclera clear. Pupils equal.  ENT: Moist oral mucosa. Trachea midline, no adenopathy. Cardiovascular: Regular rhythm, normal S1, S2. No murmur. No edema in lower extremities  Respiratory: Not using accessory muscles. Good inspiratory effort. Clear to auscultation bilaterally, no wheeze or crackles. GI: Abdomen soft, no tenderness, not distended, normal bowel sounds  Musculoskeletal: No cyanosis in digits, neck supple  Neurology: CN 2-12 grossly intact. No speech or motor deficits  Psych: Normal affect. Alert and oriented in time, place and person  Skin: Warm, dry, normal turgor    Labs and Tests:  CBC:   Recent Labs     11/08/22  1457   WBC 17.5*   HGB 12.5*        BMP:    Recent Labs     11/08/22  1457 11/09/22  0648   * 140   K 4.8 4.3    107   CO2 15* 17*   BUN 39* 36*   CREATININE 1.8* 1.6*   GLUCOSE 112* 100*     Hepatic:   Recent Labs     11/08/22  1457   *   ALT 33   BILITOT 0.6   ALKPHOS 37*       Discussed care with patient             Problem List  Principal Problem:    Sepsis ([address]  Resolved Problems:    * No resolved hospital problems. *       Assessment & Plan:   Sepsis  -Likely secondary to COVID-19. COVID test came back positive still spiking fever  -Given that we have a source I will continue antibiotics for 1 more day and de-escalated tomorrow  -Monitor closely for worsening symptom is high risk for deterioration. CLL  -Continue current regimen. Leukocytosis is at baseline per patient      Acute kidney injury  -Likely prerenal continue IV antibiotics monitor closely    Diet: ADULT DIET;  Regular  Code:Full Code  DVT PPX teresax       Gema Cotto MD   11/9/2022 12:21 PM

## 2022-11-10 NOTE — PROGRESS NOTES
11/10/22 1736   RT Protocol   History Pulmonary Disease 0   Respiratory pattern 0   Breath sounds 2   Cough 2   Indications for Bronchodilator Therapy Decreased or absent breath sounds   Bronchodilator Assessment Score 4

## 2022-11-10 NOTE — RT PROTOCOL NOTE
RT Inhaler-Nebulizer Bronchodilator Protocol Note    There is a bronchodilator order in the chart from a provider indicating to follow the RT Bronchodilator Protocol and there is an Initiate RT Inhaler-Nebulizer Bronchodilator Protocol order as well (see protocol at bottom of note). CXR Findings:  XR CHEST PORTABLE    Result Date: 11/10/2022  No significant improvement. Multifocal bilateral infiltrates. Mild cardiomegaly. The findings from the last RT Protocol Assessment were as follows:   History Pulmonary Disease: None or smoker <15 pack years  Respiratory Pattern: Regular pattern and RR 12-20 bpm  Breath Sounds: Slightly diminished and/or crackles  Cough: Weak, productive  Indication for Bronchodilator Therapy: Decreased or absent breath sounds  Bronchodilator Assessment Score: 4    Aerosolized bronchodilator medication orders have been revised according to the RT Inhaler-Nebulizer Bronchodilator Protocol below. Respiratory Therapist to perform RT Therapy Protocol Assessment initially then follow the protocol. Repeat RT Therapy Protocol Assessment PRN for score 0-3 or on second treatment, BID, and PRN for scores above 3. No Indications - adjust the frequency to every 6 hours PRN wheezing or bronchospasm, if no treatments needed after 48 hours then discontinue using Per Protocol order mode. If indication present, adjust the RT bronchodilator orders based on the Bronchodilator Assessment Score as indicated below. Use Inhaler orders unless patient has one or more of the following: on home nebulizer, not able to hold breath for 10 seconds, is not alert and oriented, cannot activate and use MDI correctly, or respiratory rate 25 breaths per minute or more, then use the equivalent nebulizer order(s) with same Frequency and PRN reasons based on the score. If a patient is on this medication at home then do not decrease Frequency below that used at home.     0-3 - enter or revise RT bronchodilator order(s) to equivalent RT Bronchodilator order with Frequency of every 4 hours PRN for wheezing or increased work of breathing using Per Protocol order mode. 4-6 - enter or revise RT Bronchodilator order(s) to two equivalent RT bronchodilator orders with one order with BID Frequency and one order with Frequency of every 4 hours PRN wheezing or increased work of breathing using Per Protocol order mode. 7-10 - enter or revise RT Bronchodilator order(s) to two equivalent RT bronchodilator orders with one order with TID Frequency and one order with Frequency of every 4 hours PRN wheezing or increased work of breathing using Per Protocol order mode. 11-13 - enter or revise RT Bronchodilator order(s) to one equivalent RT bronchodilator order with QID Frequency and an Albuterol order with Frequency of every 4 hours PRN wheezing or increased work of breathing using Per Protocol order mode. Greater than 13 - enter or revise RT Bronchodilator order(s) to one equivalent RT bronchodilator order with every 4 hours Frequency and an Albuterol order with Frequency of every 2 hours PRN wheezing or increased work of breathing using Per Protocol order mode. RT to enter RT Home Evaluation for COPD & MDI Assessment order using Per Protocol order mode.     Electronically signed by Donny Orr RCP on 11/10/2022 at 5:36 PM

## 2022-11-10 NOTE — PROGRESS NOTES
Sharlon Canavan, MD Kathi Bruin, MD Leah Gola, MD                  Office: [phone]                      Fax: [phone]             [facility]                   NEPHROLOGY INPATIENT PROGRESS NOTE:     PATIENT NAME: Barbara Patel  : 1934  MRN: [id number]      RECOMMENDATIONS:   - hold Torsemide    - check blood gas  - changes NS to low rate Bicarb  - COVID-19 mx  - trend serum uric acid  -f/up w/ hem-onc   - monitor CBC w/ h/o CLL  - IV antibiotic: Vancomycin: trough goal <15, pharmacy team assisting.   - monitor PVR w/ bladder scan for willis insertion need. - at higher risk for decompensation, needing closer monitoring. 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):   - BL Scr- lowest recently 1.4-1.5, as off -22   ->  1.8 on admission  - Etiology of ROSIE - presumed pre-renal now    - other differentials: unlikely  GN / TI / TMA process  - UA : needs it  - Renal imaging: on : 3-2021 w IV dye CT - Tiny nonobstructing right renal stone      Associated problems:   - Volume status: mild hypo-volemic  : HTN : no need for tight control    : Na: hyponatremia - mild 130 on admission, no recent h/o hypoNa, so likely acute, so should be ok for acute reversal     - Azotemia: pre-renal   - Electrolytes: K: WNL  - Acid-Base: acidosis, non-AGMA 15 on admission, LA WNL  - Anemia: of chronic disease        Other major problems: Management per primary and other consulting teams.      H/O CLL     - CT chest -   Consolidation in the mid to lower lungs is compatible with multifocal pneumonia. Suspected right hilar lymphadenopathy is likely reactive. Suggest follow-up contrast-enhanced chest CT within 3 months to assess for resolution. -- would need fup w/ PCP      Hospital Problems             Last Modified POA    * (Principal) Sepsis ([facility] 11/8/2022 Yes       : other supportive care :   - Check daily renal function panel with electrolytes-phosphorus  - Strict monitoring of I/Os, daily weight  - Renal feeds/diet  - Current medications reviewed. - Nephrotoxic medications have been discontinued. - Dose adjusted and appropriate. - Dose meds for eGFR <15 mL/min/1.73m2 during ROSIE    - Avoid heavy opioids due to renal failure - may use very low dose dilaudid / fentanyl with close monitoring of CNS and respiratory depression. Please refer to the orders. High Complexity. Multiple complex problems. Discussed with patient and treatment team-    Time spent > 30 (~35) minutes. Thank you for allowing me to participate in this patient's care. Please do not hesitate to contact me anytime. We will follow along with you. Shala Yañez MD,  [facility] [address] [place] [address] [place]: [phone] or [address]  Fax: [phone]        =======================================================================================   =======================================================================================  Subjective / interval history:   Patient was seen comfortably  in the bed,   Reported no active complaints,   Renal function better. ON 5L NC  Bp OK   Ok UOP        Past medical, Surgical, Social, Family medical history reviewed by me. MEDICATIONS: reviewed by me. Medications Prior to Admission:  No current facility-administered medications on file prior to encounter.      Current Outpatient Medications on File Prior to Encounter   Medication Sig Dispense Refill    amLODIPine (NORVASC) 10 MG tablet TAKE 1 TABLET BY MOUTH DAILY 90 tablet 3    torsemide (DEMADEX) 20 MG tablet TAKE 1 TABLET BY MOUTH DAILY 30 tablet 5    potassium chloride (KLOR-CON M) 10 MEQ extended release tablet TAKE 1 TABLET BY MOUTH DAILY 90 tablet 3    donepezil (ARICEPT) 10 MG tablet Take 1 tablet by mouth nightly 90 tablet 3    primidone (MYSOLINE) 50 MG tablet TAKE 1 TABLET BY MOUTH DAILY 90 tablet 3    levothyroxine (SYNTHROID) 100 MCG tablet Take 1 tablet by mouth Daily 90 tablet 3    propranolol (INDERAL) 20 MG tablet TAKE 1 TABLET BY MOUTH DAILY 90 tablet 3    allopurinol (ZYLOPRIM) 100 MG tablet Take 100 mg by mouth daily      ibrutinib (IMBRUVICA) 420 MG tablet Take 420 mg by mouth daily      Calcium Carb-Cholecalciferol (CALCIUM/VITAMIN D PO) Take by mouth 1200 mg qd      multivitamin (ANTIOXIDANT;PROSIGHT) TABS per tablet Take 1 tablet by mouth daily.            Current Facility-Administered Medications:     ipratropium-albuterol (DUONEB) nebulizer solution 1 ampule, 1 ampule, Inhalation, Q4H Devendra DAIVS MD    dexamethasone (PF) (DECADRON) injection 10 mg, 10 mg, IntraVENous, Q24H, Devendra Jensen MD    allopurinol (ZYLOPRIM) tablet 100 mg, 100 mg, Oral, Daily, Devendra Jensen MD, 100 mg at 11/09/22 0859    amLODIPine (NORVASC) tablet 10 mg, 10 mg, Oral, Daily, Devendra Jensen MD, 10 mg at 11/09/22 0859    donepezil (ARICEPT) tablet 10 mg, 10 mg, Oral, Nightly, Devendra Jensen MD, 10 mg at 11/09/22 1938    ibrutinib (IMBRUVICA) chemo tablet 420 mg PATIENT SUPPLIED (Patient Supplied), 420 mg, Oral, Nightly, Devendra Jensen MD    levothyroxine (SYNTHROID) tablet 100 mcg, 100 mcg, Oral, Daily, Devendra Jensen MD, 100 mcg at 11/10/22 0647    primidone (MYSOLINE) tablet 50 mg, 50 mg, Oral, Daily, Devendra Jensen MD, 50 mg at 11/09/22 0859    propranolol (INDERAL) tablet 20 mg, 20 mg, Oral, Daily, Devendra Jensen MD, 20 mg at 11/09/22 0859    cefepime (MAXIPIME) 2000 mg IVPB minibag, 2,000 mg, IntraVENous, Q12H, Oumar Morrison MD, Stopped at 11/10/22 3283    sodium chloride flush 0.9 % injection 5-40 mL, 5-40 mL, IntraVENous, 2 times per day, Fiorella Membreno MD, 10 mL at 11/09/22 0859    sodium chloride flush 0.9 % injection 5-40 mL, 5-40 mL, IntraVENous, PRN, Devendra Jensen MD    0.9 % sodium chloride infusion, , IntraVENous, PRN, Fiorella Membreno MD    enoxaparin Sodium (LOVENOX) injection 30 mg, 30 mg, SubCUTAneous, BID, Devendra Jensen MD, 30 mg at 11/09/22 1938    ondansetron (ZOFRAN-ODT) disintegrating tablet 4 mg, 4 mg, Oral, Q8H PRN **OR** ondansetron (ZOFRAN) injection 4 mg, 4 mg, IntraVENous, Q6H PRN, Devendra Jensen MD    polyethylene glycol (GLYCOLAX) packet 17 g, 17 g, Oral, Daily PRN, Fiorella Membreno MD    acetaminophen (TYLENOL) tablet 650 mg, 650 mg, Oral, Q6H PRN, 650 mg at 11/09/22 0630 **OR** acetaminophen (TYLENOL) suppository 650 mg, 650 mg, Rectal, Q6H PRN, Fiorella Membreno MD      Allergies reviewed by me: Oxycodone-acetaminophen and Adhesive tape    REVIEW OF SYSTEMS:  As mentioned in chief complaint and HPI , Subjective   All other 10-point review of systems: negative.            =======================================================================================     PHYSICAL EXAM:  Recent vital signs and recent I/Os reviewed by me.      Wt Readings from Last 3 Encounters:   11/09/22 210 lb (95.3 kg)   09/02/22 234 lb 6.4 oz (106.3 kg)   06/02/22 240 lb (108.9 kg)     BP Readings from Last 3 Encounters:   11/10/22 132/62   09/02/22 124/70   06/30/22 124/63     Patient Vitals for the past 24 hrs:   BP Temp Temp src Pulse Resp SpO2   11/10/22 0638 132/62 98.2 °F (36.8 °C) Oral 82 20 92 %   11/10/22 0057 119/66 99.7 °F (37.6 °C) Oral 80 18 92 %   11/09/22 1915 126/65 98.8 °F (37.1 °C) Oral 80 18 92 %   11/09/22 1827 (!) 143/65 100.1 °F (37.8 °C) Oral 77 18 91 %   11/09/22 1739 -- -- -- -- 18 93 %   11/09/22 1026 120/61 98.2 °F (36.8 °C) Oral 71 18 96 %         Intake/Output Summary (Last 24 hours) at 11/10/2022 1011  Last data filed at 11/10/2022 0932  Gross per 24 hour   Intake 240 ml   Output 950 ml   Net -710 ml           Physical Exam  Vitals reviewed. Constitutional:       General: He is not in acute distress. Appearance: Normal appearance. He is ill-appearing. HENT:      Head: Normocephalic and atraumatic. Right Ear: External ear normal.      Left Ear: External ear normal.      Nose: Nose normal.      Mouth/Throat:      Mouth: Mucous membranes are moist. Mucous membranes are not dry. Eyes:      General: No scleral icterus. Conjunctiva/sclera: Conjunctivae normal.   Neck:      Vascular: No JVD. Cardiovascular:      Rate and Rhythm: Normal rate and regular rhythm. Heart sounds: S1 normal and S2 normal.   Pulmonary:      Effort: Pulmonary effort is normal. No respiratory distress. Breath sounds: Rhonchi present. Abdominal:      General: Bowel sounds are normal. There is no distension. Musculoskeletal:         General: Swelling (mild) present. No deformity. Cervical back: Normal range of motion and neck supple. Skin:     General: Skin is dry. Coloration: Skin is not jaundiced. Neurological:      Mental Status: He is alert and oriented to person, place, and time. Mental status is at baseline. Psychiatric:         Mood and Affect: Mood normal.         Behavior: Behavior normal.          =======================================================================================     DATA:  Diagnostic tests reviewed by me for today's visit:   (AS NEEDED FOR MY EVALUATION AND MANAGEMENT).        Recent Labs     11/08/22  1457   WBC 17.5*   HCT 40.3*          Iron Saturation:  No components found for: PERCENTFE  FERRITIN:  No results found for: FERRITIN  IRON:  No results found for: IRON  TIBC:  No results found for: TIBC    Recent Labs     11/08/22  1457 11/09/22  0648 11/10/22  0559   * 140 138   K 4.8 4.3 4.2    107 109   CO2 15* 17* 16*   BUN 39* 36* 28*   CREATININE 1.8* 1.6* 1.4*       Recent Labs     11/08/22  1457 11/09/22  0648 11/10/22  0559   CALCIUM 8.8 8.6 8.7   PHOS  --   --  2.9       No results for input(s): PH, PCO2, PO2 in the last 72 hours.     Invalid input(s): Miguel Esters    ABG:  No results found for: PH, PCO2, PO2, HCO3, BE, THGB, TCO2, O2SAT  VBG:  No results found for: PHVEN, IOV2SXU, BEVEN, P7ARPAIL    LDH:  No results found for: LDH  Uric Acid:    Lab Results   Component Value Date/Time    LABURIC 9.3 11/09/2022 06:48 AM       PT/INR:    Lab Results   Component Value Date/Time    PROTIME 12.6 06/25/2010 06:30 AM    INR 1.19 06/25/2010 06:30 AM     Warfarin PT/INR:  No components found for: PTPATWAR, PTINRWAR  PTT:    Lab Results   Component Value Date/Time    APTT 34.6 06/25/2010 06:30 AM   [APTT}  Last 3 Troponin:    Lab Results   Component Value Date/Time    TROPONINI 0.01 11/08/2022 02:57 PM       U/A:    Lab Results   Component Value Date/Time    NITRITE neg 02/01/2018 03:01 PM    COLORU Yellow 11/09/2022 02:30 PM    PROTEINU 100 11/09/2022 02:30 PM    PHUR 6.0 11/09/2022 02:30 PM    WBCUA 1 11/09/2022 02:30 PM    RBCUA 0-2 11/09/2022 02:30 PM    BACTERIA 4+ 11/09/2022 02:30 PM    CLARITYU CLOUDY 11/09/2022 02:30 PM    SPECGRAV 1.020 11/09/2022 02:30 PM    LEUKOCYTESUR SMALL 11/09/2022 02:30 PM    UROBILINOGEN 1.0 11/09/2022 02:30 PM    BILIRUBINUR Negative 11/09/2022 02:30 PM    BILIRUBINUR neg 02/01/2018 03:01 PM    BLOODU SMALL 11/09/2022 02:30 PM    GLUCOSEU Negative 11/09/2022 02:30 PM    AMORPHOUS Present 11/09/2022 02:30 PM     Microalbumen/Creatinine ratio:  No components found for: RUCREAT  24 Hour Urine for Protein:  No components found for: RAWUPRO, UHRS3, UYXF30FL, UTV3  24 Hour Urine for Creatinine Clearance:  No components found for: CREAT4, UHRS10, UTV10  Urine Toxicology:  No components found for: IAMMENTA, IBARBIT, IBENZO, ICOCAINE, IMARTHC, IOPIATES, IPHENCYC    HgBA1c:  No results found for: LABA1C  RPR:  No results found for: RPR  HIV:  No results found for: HIV  [place]:  No results found for: ANATITER, [place]  RF:  No results found for: RF  DSDNA:  No components found for: DNA  AMYLASE:  No results found for: AMYLASE  LIPASE:  No results found for: LIPASE  Fibrinogen Level:  No components found for: FIB       BELOW MENTIONED RADIOLOGY STUDY RESULTS BY ME (AS NEEDED FOR MY EVALUATION AND MANAGEMENT). CT CHEST WO CONTRAST    Result Date: 11/8/2022  Consolidation in the mid to lower lungs is compatible with multifocal pneumonia. Suspected right hilar lymphadenopathy is likely reactive. Suggest follow-up contrast-enhanced chest CT within 3 months to assess for resolution. This report was transcribed using voice recognition software, mainly. So please excuse brevity and/or typos. Every effort was made to ensure accuracy, however, inadvertent computerized transcription errors may be present. Please contact us, if any questions or clarifications are needed.

## 2022-11-10 NOTE — PROGRESS NOTES
[facility]    11/10/2022 10:24 AM        Name: Christine Monaco . Admitted: 11/8/2022  Primary Care Provider: FAIZAN Gant CNP (Tel: [phone])                        Subjective:  .     Patient still with increasing shortness of breath hypoxia oxygen requirements down to 3 L 0.5 still wheezing    Reviewed interval ancillary notes    Current Medications  ipratropium-albuterol (DUONEB) nebulizer solution 1 ampule, Q4H WA  dexamethasone (PF) (DECADRON) injection 10 mg, Q24H  sodium bicarbonate 100 mEq in dextrose 5 % 1,000 mL infusion, Continuous  allopurinol (ZYLOPRIM) tablet 100 mg, Daily  donepezil (ARICEPT) tablet 10 mg, Nightly  ibrutinib (IMBRUVICA) chemo tablet 420 mg PATIENT SUPPLIED (Patient Supplied), Nightly  levothyroxine (SYNTHROID) tablet 100 mcg, Daily  primidone (MYSOLINE) tablet 50 mg, Daily  propranolol (INDERAL) tablet 20 mg, Daily  cefepime (MAXIPIME) 2000 mg IVPB minibag, Q12H  sodium chloride flush 0.9 % injection 5-40 mL, 2 times per day  sodium chloride flush 0.9 % injection 5-40 mL, PRN  0.9 % sodium chloride infusion, PRN  enoxaparin Sodium (LOVENOX) injection 30 mg, BID  ondansetron (ZOFRAN-ODT) disintegrating tablet 4 mg, Q8H PRN   Or  ondansetron (ZOFRAN) injection 4 mg, Q6H PRN  polyethylene glycol (GLYCOLAX) packet 17 g, Daily PRN  acetaminophen (TYLENOL) tablet 650 mg, Q6H PRN   Or  acetaminophen (TYLENOL) suppository 650 mg, Q6H PRN        Objective:  /62   Pulse 82   Temp 98.2 °F (36.8 °C) (Oral)   Resp 20   Ht 6' 2\" (1.88 m)   Wt 210 lb (95.3 kg)   SpO2 92%   BMI 26.96 kg/m²     Intake/Output Summary (Last 24 hours) at 11/10/2022 1024  Last data filed at 11/10/2022 0932  Gross per 24 hour   Intake 240 ml   Output 950 ml   Net -710 ml      Wt Readings from Last 3 Encounters:   11/09/22 210 lb (95.3 kg) 09/02/22 234 lb 6.4 oz (106.3 kg)   06/02/22 240 lb (108.9 kg)       General appearance:  Appears comfortable  Eyes: Sclera clear. Pupils equal.  ENT: Moist oral mucosa. Trachea midline, no adenopathy. Cardiovascular: Regular rhythm, normal S1, S2. No murmur. No edema in lower extremities  Respiratory: Not using accessory muscles. Good inspiratory effort. Clear to auscultation bilaterally, no wheeze or crackles. GI: Abdomen soft, no tenderness, not distended, normal bowel sounds  Musculoskeletal: No cyanosis in digits, neck supple  Neurology: CN 2-12 grossly intact. No speech or motor deficits  Psych: Normal affect. Alert and oriented in time, place and person  Skin: Warm, dry, normal turgor    Labs and Tests:  CBC:   Recent Labs     11/08/22  1457   WBC 17.5*   HGB 12.5*        BMP:    Recent Labs     11/08/22  1457 11/09/22  0648 11/10/22  0559   * 140 138   K 4.8 4.3 4.2    107 109   CO2 15* 17* 16*   BUN 39* 36* 28*   CREATININE 1.8* 1.6* 1.4*   GLUCOSE 112* 100* 113*     Hepatic:   Recent Labs     11/08/22  1457   *   ALT 33   BILITOT 0.6   ALKPHOS 37*       Discussed care with patient             Problem List  Principal Problem:    Sepsis ([address]  Resolved Problems:    * No resolved hospital problems. *       Assessment & Plan:   Acute hypoxic respiratory failure  = Likely secondary to COVID-19  -There was some concern for possible superimposed pneumonia I will de-escalate antibiotics to cefepime only now  -Still significant wheezing and shortness of breath needing 3 L of oxygen baseline oxygen is normal for him  -Increase Decadron to 10 for now  -Continue empiric antibiotic for 1 more day repeat checks x-ray this  -Breathing treatment as needed    CLL  -Continue management per hematology    Acute kidney injury  = Improving slowly creatinine is down to 1.4. Appreciate nephrology recommendation      Diet: ADULT DIET;  Regular  ADULT ORAL NUTRITION SUPPLEMENT; Lunch, Dinner; Standard High Calorie/High Protein Oral Supplement  Code:Full Code  DVT PPX lizandro Jones MD   11/10/2022 10:24 AM

## 2022-11-10 NOTE — PROGRESS NOTES
Nutrition Note    RECOMMENDATIONS  PO Diet: Regular  ONS: Ensure Enlive BID    NUTRITION ASSESSMENT   Pt triggered RD assessment for MST 4 d/t weight loss and poor intake. Spoke with pt over the phone d/t covid isolation. If accurate, EMR weight hx shows 6.8% weight loss in 2.5 months, which is significant. Reports his poor appetite is caused by his food preferences. Po intakes have been variable this admission, agreed to start ONS TID to promote adequate intake. Nutrition Related Findings: NS @ 75 mL/hr. +1 BLE, generalized edema. Mcgrath. BUN 28, Cr 1.4  Wounds: None  Nutrition Education:  Education not indicated   Nutrition Goals: PO intake 50% or greater     MALNUTRITION ASSESSMENT   Acute Illness  Malnutrition Status: Mild malnutrition  Findings of the 6 clinical characteristics of malnutrition:  Energy Intake:  Mild decrease in energy intake (Comment)  Weight Loss:  6.8% x 2.5 months   Body Fat Loss:  Unable to assess     Muscle Mass Loss:  Unable to assess    Fluid Accumulation:  Mild Extremities, Generalized   Strength:  Not Performed      NUTRITION DIAGNOSIS   Inadequate oral intake related to inadequate protein-energy intake as evidenced variable intakes. CURRENT NUTRITION THERAPIES  ADULT DIET; Regular     PO Intake: 51-75%, 1-25%   PO Supplement Intake:None Ordered      ANTHROPOMETRICS  Current Height: 6' 2\" (188 cm)  Current Weight: 210 lb (95.3 kg)      Ideal Body Weight (IBW): 190 lbs  (86 kg)    Usual Bodyweight 234 lb (106.1 kg)       BMI: 27    The patient will be monitored per nutrition standards of care. Consult dietitian if additional nutrition interventions are needed prior to RD reassessment.      Codey Vivas, [address], [place]    Contact: [phone]

## 2022-11-10 NOTE — PLAN OF CARE
Problem: ABCDS Injury Assessment  Goal: Absence of physical injury  11/9/2022 2117 by Arnel Turner RN  Outcome: Progressing     Problem: Safety - Adult  Goal: Free from fall injury  11/9/2022 2117 by Arnel Turner RN  Outcome: Progressing  Note: Safety precautions in place. Bed locked, alarmed, lowest position. Call light in reach, gripper socks on, camera for safety. No falls or physical injury noted. Problem: Respiratory - Adult  Goal: Achieves optimal ventilation and oxygenation  Outcome: Progressing  Flowsheets (Taken 11/9/2022 2117)  Achieves optimal ventilation and oxygenation:   Assess for changes in respiratory status   Assess for changes in mentation and behavior   Position to facilitate oxygenation and minimize respiratory effort   Oxygen supplementation based on oxygen saturation or arterial blood gases   Respiratory therapy support as indicated   Assess and instruct to report shortness of breath or any respiratory difficulty  Note: Currently on 3L NC with sat >92%, RR 18, unlabored.      Problem: Cardiovascular - Adult  Goal: Maintains optimal cardiac output and hemodynamic stability  Outcome: Progressing     Problem: Musculoskeletal - Adult  Goal: Return mobility to safest level of function  Outcome: Progressing

## 2022-11-11 NOTE — PROGRESS NOTES
MD Nadja Logan MD Vela Borer, MD                  Office: [phone]                      Fax: [phone]             [facility]                   NEPHROLOGY INPATIENT PROGRESS NOTE:     PATIENT NAME: Maricarmen Tee  : 1934  MRN: [id number]      RECOMMENDATIONS:   - pulmonary fup for worsening hypoxia, COVID-19 mx   - multifocal pneumonia pic on CT, no major fluid overload   - hold Torsemide     - can use PRN IV Lasix   - changed NS to low rate Bicarb  - COVID-19 mx  - trend serum uric acid  -f/up w/ hem-onc   - monitor CBC w/ h/o CLL  - IV antibiotic: Vancomycin: trough goal <15, pharmacy team assisting.   - monitor PVR w/ bladder scan for willis insertion need. - at higher risk for decompensation, needing closer monitoring.     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):   - BL Scr- lowest recently 1.4-1.5, as off 22   ->  1.8 on admission  - Etiology of ROSIE - presumed pre-renal now    - other differentials: unlikely  GN / TI / TMA process  - UA : needs it  - Renal imaging: on : 3-2021 w IV dye CT - Tiny nonobstructing right renal stone      Associated problems:   - Volume status: mild hypo-volemic  : HTN : no need for tight control    : Na: hyponatremia - mild 130 on admission, no recent h/o hypoNa, so likely acute, so should be ok for acute reversal     - Azotemia: pre-renal   - Electrolytes: K: WNL  - Acid-Base: acidosis, non-AGMA 15 on admission, LA WNL- checked blood gas - likely [email or web address]    - Anemia: of chronic disease        Other major problems: Management per primary and other consulting teams. H/O CLL     - CT chest -   Consolidation in the mid to lower lungs is compatible with multifocal pneumonia. Suspected right hilar lymphadenopathy is likely reactive. Suggest follow-up contrast-enhanced chest CT within 3 months to assess for resolution. -- would need fup w/ PCP      Hospital Problems             Last Modified POA    * (Principal) Sepsis ([facility] 11/8/2022 Yes     : other supportive care :   - Check daily renal function panel with electrolytes-phosphorus  - Strict monitoring of I/Os, daily weight  - Renal feeds/diet  - Current medications reviewed. - Nephrotoxic medications have been discontinued. - Dose adjusted and appropriate. - Dose meds for eGFR <15 mL/min/1.73m2 during ROSIE    - Avoid heavy opioids due to renal failure - may use very low dose dilaudid / fentanyl with close monitoring of CNS and respiratory depression. Please refer to the orders. High Complexity. Multiple complex problems. Discussed with patient and treatment team-    Time spent > 30 (~35) minutes. Thank you for allowing me to participate in this patient's care. Please do not hesitate to contact me anytime. We will follow along with you. Maira Pinto MD,  [facility] [address] [place] [address] [place]: [phone] or Via [employer]  Fax: [phone]        =======================================================================================   =======================================================================================  Subjective / interval history: worsening hypoxia, needing more O2   Renal function better. Bp slightly higher  Ok UOP  Past medical, Surgical, Social, Family medical history reviewed by me. MEDICATIONS: reviewed by me. Medications Prior to Admission:  No current facility-administered medications on file prior to encounter.      Current Outpatient Medications on File Prior to Encounter   Medication Sig Dispense Refill    amLODIPine (NORVASC) 10 MG tablet TAKE 1 TABLET BY MOUTH DAILY 90 tablet 3    torsemide (DEMADEX) 20 MG tablet TAKE 1 TABLET BY MOUTH DAILY 30 tablet 5    potassium chloride (KLOR-CON M) 10 MEQ extended release tablet TAKE 1 TABLET BY MOUTH DAILY 90 tablet 3    donepezil (ARICEPT) 10 MG tablet Take 1 tablet by mouth nightly 90 tablet 3    primidone (MYSOLINE) 50 MG tablet TAKE 1 TABLET BY MOUTH DAILY 90 tablet 3    levothyroxine (SYNTHROID) 100 MCG tablet Take 1 tablet by mouth Daily 90 tablet 3    propranolol (INDERAL) 20 MG tablet TAKE 1 TABLET BY MOUTH DAILY 90 tablet 3    allopurinol (ZYLOPRIM) 100 MG tablet Take 100 mg by mouth daily      ibrutinib (IMBRUVICA) 420 MG tablet Take 420 mg by mouth daily      Calcium Carb-Cholecalciferol (CALCIUM/VITAMIN D PO) Take by mouth 1200 mg qd      multivitamin (ANTIOXIDANT;PROSIGHT) TABS per tablet Take 1 tablet by mouth daily.            Current Facility-Administered Medications:     dexamethasone (PF) (DECADRON) injection 10 mg, 10 mg, IntraVENous, Q24H, Devendra Jensen MD, 10 mg at 11/11/22 0931    sodium bicarbonate 100 mEq in dextrose 5 % 1,000 mL infusion, , IntraVENous, Continuous, Louise Coreas MD, Last Rate: 50 mL/hr at 11/10/22 1236, New Bag at 11/10/22 1236    albuterol sulfate HFA (PROVENTIL;VENTOLIN;PROAIR) 108 (90 Base) MCG/ACT inhaler 2 puff, 2 puff, Inhalation, BID, Devendra Jensen MD, 2 puff at 11/11/22 0853    ipratropium (ATROVENT HFA) 17 MCG/ACT inhaler 2 puff, 2 puff, Inhalation, BID, Devendra Jensen MD, 2 puff at 11/11/22 0853    allopurinol (ZYLOPRIM) tablet 100 mg, 100 mg, Oral, Daily, Devendra Jensen MD, 100 mg at 11/11/22 0931    donepezil (ARICEPT) tablet 10 mg, 10 mg, Oral, Nightly, Devendra Jensen MD, 10 mg at 11/10/22 2047    ibrutinib (IMBRUVICA) chemo tablet 420 mg PATIENT SUPPLIED (Patient Supplied), 420 mg, Oral, Nightly, Joey Kulkarni MD    levothyroxine (SYNTHROID) tablet 100 mcg, 100 mcg, Oral, Daily, Devendra Jensen MD, 100 mcg at 11/11/22 1246    primidone (MYSOLINE) tablet 50 mg, 50 mg, Oral, Daily, Devendra Jensen MD, 50 mg at 11/11/22 0931    propranolol (INDERAL) tablet 20 mg, 20 mg, Oral, Daily, Devendra Jensen MD, 20 mg at 11/11/22 0931    cefepime (MAXIPIME) 2000 mg IVPB minibag, 2,000 mg, IntraVENous, Q12H, Devendra Mcfarlane MD, Stopped at 11/11/22 0720    sodium chloride flush 0.9 % injection 5-40 mL, 5-40 mL, IntraVENous, 2 times per day, Gema Cotto MD, 10 mL at 11/10/22 1025    sodium chloride flush 0.9 % injection 5-40 mL, 5-40 mL, IntraVENous, PRN, Gema Cotto MD    0.9 % sodium chloride infusion, , IntraVENous, PRN, Gema Cotto MD, Last Rate: 25 mL/hr at 11/10/22 1739, New Bag at 11/10/22 1739    enoxaparin Sodium (LOVENOX) injection 30 mg, 30 mg, SubCUTAneous, BID, Devendra Jensen MD, 30 mg at 11/11/22 0931    ondansetron (ZOFRAN-ODT) disintegrating tablet 4 mg, 4 mg, Oral, Q8H PRN **OR** ondansetron (ZOFRAN) injection 4 mg, 4 mg, IntraVENous, Q6H PRN, Devendra Jensen MD    polyethylene glycol (GLYCOLAX) packet 17 g, 17 g, Oral, Daily PRN, Devendra Jensen MD    acetaminophen (TYLENOL) tablet 650 mg, 650 mg, Oral, Q6H PRN, 650 mg at 11/10/22 1032 **OR** acetaminophen (TYLENOL) suppository 650 mg, 650 mg, Rectal, Q6H PRN, Gema Cotto MD         =======================================================================================     PHYSICAL EXAM:  Recent vital signs and recent I/Os reviewed by me.      Wt Readings from Last 3 Encounters:   11/11/22 222 lb 3.2 oz (100.8 kg)   09/02/22 234 lb 6.4 oz (106.3 kg)   06/02/22 240 lb (108.9 kg)     BP Readings from Last 3 Encounters:   11/11/22 (!) 154/70   09/02/22 124/70   06/30/22 124/63     Patient Vitals for the past 24 hrs:   BP Temp Temp src Pulse Resp SpO2 Weight   11/11/22 0854 -- -- -- 90 22 90 % --   11/11/22 0808 -- -- -- -- -- -- 222 lb 3.2 oz (100.8 kg)   11/11/22 0645 (!) 154/70 98.7 °F (37.1 °C) Oral 88 20 92 % --   11/10/22 2350 (!) 146/74 98.6 °F (37 °C) Axillary 90 18 93 % --   11/10/22 1900 128/62 97.4 °F (36.3 °C) Axillary 75 20 93 % --   11/10/22 1733 -- -- -- 70 18 94 % --   11/10/22 1700 120/72 98.1 °F (36.7 °C) Axillary -- 20 93 % --   11/10/22 1215 111/62 98.4 °F (36.9 °C) Axillary 73 20 90 % --       No intake or output data in the 24 hours ending 11/11/22 1053        Physical Exam  Vitals reviewed. Constitutional:       General: He is   in acute distress. Appearance: Normal appearance. He is ill-appearing. HENT:      Head: Normocephalic and atraumatic. Right Ear: External ear normal.      Left Ear: External ear normal.      Nose: Nose normal.      Mouth/Throat:      Mouth: Mucous membranes are moist. Mucous membranes are not dry. Eyes:      General: No scleral icterus. Conjunctiva/sclera: Conjunctivae normal.   Neck:      Vascular: No JVD. Cardiovascular:      Rate and Rhythm: Normal rate and regular rhythm. Heart sounds: S1 normal and S2 normal.   Pulmonary:      Effort: Pulmonary effort is ab-normal. .      Breath sounds: Rhonchi present. Abdominal:      General: Bowel sounds are normal. There is no distension. Musculoskeletal:         General: Swelling (mild) present. No deformity. Cervical back: Normal range of motion and neck supple. Skin:     General: Skin is dry. Coloration: Skin is not jaundiced. Neurological:      Mental Status: He is alert and oriented to person, place, and time. Mental status is at baseline. Psychiatric:         Mood and Affect: Mood normal.         Behavior: Behavior normal.          =======================================================================================     DATA:  Diagnostic tests reviewed by me for today's visit:   (AS NEEDED FOR MY EVALUATION AND MANAGEMENT).        Recent Labs     11/08/22  1457 11/11/22  0527   WBC 17.5* 15.6*   HCT 40.3* 38.6*    228       Iron Saturation:  No components found for: Chandrika Day:  No results found for: FERRITIN  IRON:  No results found for: IRON  TIBC:  No results found for: TIBC    Recent Labs     11/08/22  1457 11/09/22 0648 11/10/22  0559 11/11/22  0527   * 140 138 142   K 4.8 4.3 4.2 4.8    107 109 111*   CO2 15* 17* 16* 18*   BUN 39* 36* 28* 33*   CREATININE 1.8* 1.6* 1.4* 1.6*       Recent Labs     11/08/22  1457 11/09/22  0648 11/10/22  0559 11/11/22  0527   CALCIUM 8.8 8.6 8.7 9.3   PHOS  --   --  2.9 1.3*       No results for input(s): PH, PCO2, PO2 in the last 72 hours.     Invalid input(s): Kristine Nichol    ABG:  No results found for: PH, PCO2, PO2, HCO3, BE, THGB, TCO2, O2SAT  VBG:    Lab Results   Component Value Date/Time    PHVEN 7.396 11/10/2022 11:37 AM    IWH2VNP 24.7 11/10/2022 11:37 AM    BEVEN -8.1 11/10/2022 11:37 AM    Q8BULKEG 100 11/10/2022 11:37 AM       LDH:  No results found for: LDH  Uric Acid:    Lab Results   Component Value Date/Time    LABURIC 7.9 11/10/2022 05:59 AM       PT/INR:    Lab Results   Component Value Date/Time    PROTIME 12.6 06/25/2010 06:30 AM    INR 1.19 06/25/2010 06:30 AM     Warfarin PT/INR:  No components found for: PTPATWAR, PTINRWAR  PTT:    Lab Results   Component Value Date/Time    APTT 34.6 06/25/2010 06:30 AM   [APTT}  Last 3 Troponin:    Lab Results   Component Value Date/Time    TROPONINI 0.01 11/08/2022 02:57 PM       U/A:    Lab Results   Component Value Date/Time    NITRITE neg 02/01/2018 03:01 PM    COLORU Yellow 11/09/2022 02:30 PM    PROTEINU 100 11/09/2022 02:30 PM    PHUR 6.0 11/09/2022 02:30 PM    WBCUA 1 11/09/2022 02:30 PM    RBCUA 0-2 11/09/2022 02:30 PM    BACTERIA 4+ 11/09/2022 02:30 PM    CLARITYU CLOUDY 11/09/2022 02:30 PM    SPECGRAV 1.020 11/09/2022 02:30 PM    LEUKOCYTESUR SMALL 11/09/2022 02:30 PM    UROBILINOGEN 1.0 11/09/2022 02:30 PM    BILIRUBINUR Negative 11/09/2022 02:30 PM    BILIRUBINUR neg 02/01/2018 03:01 PM    BLOODU SMALL 11/09/2022 02:30 PM GLUCOSEU Negative 11/09/2022 02:30 PM    AMORPHOUS Present 11/09/2022 02:30 PM     Microalbumen/Creatinine ratio:  No components found for: RUCREAT  24 Hour Urine for Protein:  No components found for: RAWUPRO, UHRS3, ZJBX96KI, UTV3  24 Hour Urine for Creatinine Clearance:  No components found for: CREAT4, UHRS10, UTV10  Urine Toxicology:  No components found for: IAMMENTA, IBARBIT, IBENZO, ICOCAINE, IMARTHC, IOPIATES, IPHENCYC    HgBA1c:  No results found for: LABA1C  RPR:  No results found for: RPR  HIV:  No results found for: HIV  [place]:  No results found for: ANATITER, [place]  RF:  No results found for: RF  DSDNA:  No components found for: DNA  AMYLASE:  No results found for: AMYLASE  LIPASE:  No results found for: LIPASE  Fibrinogen Level:  No components found for: FIB       BELOW MENTIONED RADIOLOGY STUDY RESULTS BY ME (AS NEEDED FOR MY EVALUATION AND MANAGEMENT). CT CHEST WO CONTRAST    Result Date: 11/8/2022  Consolidation in the mid to lower lungs is compatible with multifocal pneumonia. Suspected right hilar lymphadenopathy is likely reactive. Suggest follow-up contrast-enhanced chest CT within 3 months to assess for resolution. This report was transcribed using voice recognition software, mainly. So please excuse brevity and/or typos. Every effort was made to ensure accuracy, however, inadvertent computerized transcription errors may be present. Please contact us, if any questions or clarifications are needed.

## 2022-11-11 NOTE — CONSULTS
PULMONARY AND CRITICAL CARE MEDICINE CONSULT NOTE      Name: Maricarmen Tee  Sex: male  : 1934  MRN: [id number]  Admission Date: 2022  Admission Diagnosis: Septicemia ([facility] [A41.9]  Abnormal CXR [R93.89]  Acute respiratory failure with hypoxia (HCC) [J96.01]  Atrial flutter by electrocardiogram ([facility] [I48.92]  Sepsis ([facility] [A41.9]  History of chronic lymphocytic leukemia [Z85.6]  Community acquired pneumonia, unspecified laterality [J18.9]      HPI: Patient is a 80 y.o. male with past medical history significant for CLL on ibrutinib, hypertension, history of prostate cancer who presented to hospital on 2022 with cough, fever as well as shortness of breath. Patient's wife is sick with similar symptoms. Patient has history of CLL patient and has leukocytosis which is chronic. Patient was noted to be positive for COVID. Imaging findings are consistent with COVID-pneumonia. Patient's oxygen requirements have been slowly deteriorating. This morning patient was up to 8 L/min FiO2. When I saw the patient, patient was short of breath and was saturating 84% on 8 L/min FiO2. I slowly increased oxygen demand up to 12 to 14 L/min FiO2 and patient continued to remain hypoxic with sats of 85 to 86%. Also with shortness of breath. Patient was noted to have elevated procalcitonin on admission, 0.16. Blood cultures negative. MRSA nares negative. He has been on Decadron 10 mg IV daily. Also noted to have ROSIE for which he is on bicarbonate drip at 50 cc/h with creatinine being stable. REVIEW OF SYSTEMS:   Constitutional symptoms: The patient denies fever, fatigue, night sweats, weight loss or weight gain. HEENT: No vision changes. No tinnitus, Denies sinus pain. No hoarseness, or dysphagia. Neck: Patient denies swelling in the neck. Cardiovascular: Denies chest pain, palpitation, syncope.   Respiratory: Positive for shortness of breath, cough, wheezing  Gastrointestinal: Denies nausea, abdominal pain or change in bowel function. Genitourinary: Denies burning in urine  Breasts: No masses or lumps in the breasts. Skin: No rashes or itching. Muskuloskeletal: Denies weakness or bone pain. Neurological: No headaches or seizures. Psychiatric: Denies mood swings or depression. Endocrine: Denies heat or cold intolerance or excessive thirst.  Hematological/Lymphatic: Denies easy bruising or lymph node swelling. Allergic/Immunological: No environmental allergies.     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     Social History     Socioeconomic History    Marital status:      Spouse name: Not on file    Number of children: Not on file    Years of education: Not on file    Highest education level: Not on file   Occupational History    Not on file   Tobacco Use    Smoking status: Former     Packs/day: 1.00     Years: 38.00     Pack years: 38.00     Types: Cigarettes     Quit date: 1984     Years since quittin.4    Smokeless tobacco: Never   Vaping Use    Vaping Use: Never used   Substance and Sexual Activity    Alcohol use: No    Drug use: No    Sexual activity: Not on file   Other Topics Concern    Not on file   Social History Narrative    Not on file     Social Determinants of Health     Financial Resource Strain: Low Risk     Difficulty of Paying Living Expenses: Not hard at all   Food Insecurity: No Food Insecurity Worried About Running Out of Food in the Last Year: Never true    Ran Out of Food in the Last Year: Never true   Transportation Needs: Not on file   Physical Activity: Not on file   Stress: Not on file   Social Connections: Not on file   Intimate Partner Violence: Not on file   Housing Stability: Not on file     Family History   Problem Relation Age of Onset    Dementia Mother     Other Mother [de-identified]        pneumonia    High Blood Pressure Mother     High Cholesterol Mother     Bleeding Prob Father 47        blood poisoning    Heart Disease Neg Hx      Allergies   Allergen Reactions    Oxycodone-Acetaminophen Other (See Comments)     halucinations  Other reaction(s): hallucinations    Adhesive Tape      Skin becomes raw       MEDICATIONS:     Scheduled Meds:     dexamethasone  20 mg IntraVENous Q24H    albuterol sulfate HFA  2 puff Inhalation BID    ipratropium  2 puff Inhalation BID    allopurinol  100 mg Oral Daily    donepezil  10 mg Oral Nightly    ibrutinib  420 mg Oral Nightly    levothyroxine  100 mcg Oral Daily    primidone  50 mg Oral Daily    propranolol  20 mg Oral Daily    cefepime  2,000 mg IntraVENous Q12H    sodium chloride flush  5-40 mL IntraVENous 2 times per day    enoxaparin  30 mg SubCUTAneous BID     Current Infusions:    sodium bicarbonate infusion 50 mL/hr at 11/10/22 1236    sodium chloride 25 mL/hr at 11/10/22 1739       PRN meds:  sodium chloride flush, sodium chloride, ondansetron **OR** ondansetron, polyethylene glycol, acetaminophen **OR** acetaminophen    PHYSICAL EXAM:    /66   Pulse 92   Temp 99.7 °F (37.6 °C) (Oral)   Resp 24   Ht 6' 2\" (1.88 m)   Wt 222 lb 3.2 oz (100.8 kg)   SpO2 91%   BMI 28.53 kg/m²  I/O last 3 completed shifts:  In: -   Out: 950 [Urine:950] No intake/output data recorded. No intake or output data in the 24 hours ending 11/11/22 1138      CONSTITUTIONAL: He is a 80y.o.-year-old who appears his stated age. He is mild respiratory distress. CARDIOVASCULAR: S1 S2 RRR. Without murmer  RESPIRATORY & CHEST: Lungs are clear to auscultation and percussion. No wheezing, no crackles. Good air movement  GASTROINTESTINAL & ABDOMEN: Soft, nontender, positive bowel sounds in all quadrants, non-distended, without hepatosplenomegaly. GENITOURINARY: Deferred. MUSCULOSKELETAL: No tenderness to palpation of the axial skeleton. There is no clubbing. No cyanosis. No edema of the lower extremities. SKIN OF BODY: No rash or jaundice. PSYCHIATRIC EVALUATION: Normal affect. Patient answers questions appropriately. HEMATOLOGIC/LYMPHATIC/ IMMUNOLOGIC: No palpable lymphadenopathy. NEUROLOGIC: Alert and oriented. Groslly non-focal. Motor strength is 5+/5 in all muscle groups. The patient has a normal sensorium globally. LABS:    Recent Labs     11/08/22  1457 11/11/22  0527   WBC 17.5* 15.6*   RBC 4.61 4.63   HGB 12.5* 12.6*   HCT 40.3* 38.6*   MCH 27.2 27.1   MCHC 31.1 32.5   RDW 18.3* 17.6*    228   MPV 8.7 8.1   NEUTOPHILPCT 38.0 60.9   MONOPCT 8.0 3.5   BASOPCT 0.0 0.8     Recent Labs     11/08/22  1457 11/09/22  0648 11/10/22  0559 11/11/22  0527   * 140 138 142   K 4.8 4.3 4.2 4.8    107 109 111*   ANIONGAP 14 16 13 13   CO2 15* 17* 16* 18*   BUN 39* 36* 28* 33*   CREATININE 1.8* 1.6* 1.4* 1.6*   CALCIUM 8.8 8.6 8.7 9.3   PROT 6.9  --   --   --    BILITOT 0.6  --   --   --    ALKPHOS 37*  --   --   --    ALT 33  --   --   --    *  --   --   --    GLUCOSE 112* 100* 113* 113*       Recent Labs     11/08/22  1457   TROPONINI 0.01       IMAGING:  I reviewed the chest x-ray from 11/11/2022 and my interpretation as follows. Bilateral multifocal infiltrates noted. I reviewed the CT chest from 11/8/2022 and my interpretation is as follows. Bilateral multifocal infiltrates consistent with COVID-pneumonia.       IMPRESSION:  Acute hypoxic respiratory failure  Pneumonia due to COVID-19  Acute on chronic CKD   CLL, on ibrutinib      PLAN:  Patient presented with acute hypoxic respiratory failure secondary to COVID-19 pneumonia. Patient's clinical and respiratory status has been deteriorating. This morning his O2 had to be increased up to 14 to 15 L/min FiO2 without much improvement in saturation. I would recommend to transfer the patient to ICU for closer monitoring. At the same time, I would recommend to switch the patient to heated high flow to maintain saturation above 90%. Increase Decadron to 20 mg IV daily. Obtain CRP, D-dimer and procalcitonin. Initial procalcitonin on admission was elevated, 0.16. Patient may not be a candidate of Actemra for concern for possible superimposed bacterial infection if repeat procalcitonin is elevated. Patient is already on cefepime per primary team.  Patient also presented with acute on chronic CKD with baseline creatinine 1.4-1.5. Creatinine is slightly worse than yesterday. He is on low-dose bicarbonate drip per nephrology with slow improvement in bicarbonate. Patient is fully vaccinated for COVID and also received a booster dose in November 2021. Critical Care Time: 35 Minutes  Total critical care time caring for this patient with life threatening illness, including direct patient contact, management of life support systems, review of data including imaging and labs, discussions with other team members and physicians excluding procedures. Electronically signed by Darryle Public, MD on 11/11/22 at 11:38 AM EST     This note was completed using dragon medical speech recognition software. Grammatical errors, random word insertions, pronoun errors and incomplete sentences are occasional consequences of this technology due to software limitations. If there are questions or concerns about the content of this note of information contained within the body of this dictation, they should be addressed with the provider for clarification.

## 2022-11-11 NOTE — PLAN OF CARE
Problem: Skin/Tissue Integrity  Goal: Absence of new skin breakdown  Description: 1. Monitor for areas of redness and/or skin breakdown  2. Assess vascular access sites hourly  3. Every 4-6 hours minimum:  Change oxygen saturation probe site  4. Every 4-6 hours:  If on nasal continuous positive airway pressure, respiratory therapy assess nares and determine need for appliance change or resting period. Outcome: Progressing     Problem: ABCDS Injury Assessment  Goal: Absence of physical injury  Outcome: Progressing     Problem: Safety - Adult  Goal: Free from fall injury  11/11/2022 0305 by Bibiana Colorado RN  Outcome: Progressing  Note: Safety precautions in place. Bed locked alarmed, lowest position. Call light in reach, gripper socks on, camera for safety. No falls or physical injury noted. Problem: Respiratory - Adult  Goal: Achieves optimal ventilation and oxygenation  Outcome: Progressing     Problem: Musculoskeletal - Adult  Goal: Return mobility to safest level of function  Outcome: Progressing  Note: Up to bedside commode contact guard to minimal assistance with no assist devices.

## 2022-11-11 NOTE — PROGRESS NOTES
[facility] PROGRESS NOTE    11/11/2022 7:07 AM        Name: Kelly Prather . Admitted: 11/8/2022  Primary Care Provider: FAIZAN Pérez CNP (Tel: [phone])                        Subjective:  .     Patient still with increasing shortness of breath hypoxia oxygen requirements down to 3 L 0.5 still wheezing    Reviewed interval ancillary notes    Current Medications  dexamethasone (PF) (DECADRON) injection 10 mg, Q24H  sodium bicarbonate 100 mEq in dextrose 5 % 1,000 mL infusion, Continuous  albuterol sulfate HFA (PROVENTIL;VENTOLIN;PROAIR) 108 (90 Base) MCG/ACT inhaler 2 puff, BID  ipratropium (ATROVENT HFA) 17 MCG/ACT inhaler 2 puff, BID  allopurinol (ZYLOPRIM) tablet 100 mg, Daily  donepezil (ARICEPT) tablet 10 mg, Nightly  ibrutinib (IMBRUVICA) chemo tablet 420 mg PATIENT SUPPLIED (Patient Supplied), Nightly  levothyroxine (SYNTHROID) tablet 100 mcg, Daily  primidone (MYSOLINE) tablet 50 mg, Daily  propranolol (INDERAL) tablet 20 mg, Daily  cefepime (MAXIPIME) 2000 mg IVPB minibag, Q12H  sodium chloride flush 0.9 % injection 5-40 mL, 2 times per day  sodium chloride flush 0.9 % injection 5-40 mL, PRN  0.9 % sodium chloride infusion, PRN  enoxaparin Sodium (LOVENOX) injection 30 mg, BID  ondansetron (ZOFRAN-ODT) disintegrating tablet 4 mg, Q8H PRN   Or  ondansetron (ZOFRAN) injection 4 mg, Q6H PRN  polyethylene glycol (GLYCOLAX) packet 17 g, Daily PRN  acetaminophen (TYLENOL) tablet 650 mg, Q6H PRN   Or  acetaminophen (TYLENOL) suppository 650 mg, Q6H PRN      Objective:  BP (!) 154/70   Pulse 88   Temp 98.7 °F (37.1 °C) (Oral)   Resp 20   Ht 6' 2\" (1.88 m)   Wt 228 lb 1.6 oz (103.5 kg)   SpO2 92%   BMI 29.29 kg/m²     Intake/Output Summary (Last 24 hours) at 11/11/2022 0707  Last data filed at 11/10/2022 0932  Gross per 24 hour   Intake --   Output 650 ml   Net -650 ml      Wt Readings from Last 3 Encounters:   11/10/22 228 lb 1.6 oz (103.5 kg)   09/02/22 234 lb 6.4 oz (106.3 kg)   06/02/22 240 lb (108.9 kg)       General appearance:  Appears comfortable  Eyes: Sclera clear. Pupils equal.  ENT: Moist oral mucosa. Trachea midline, no adenopathy. Cardiovascular: Regular rhythm, normal S1, S2. No murmur. No edema in lower extremities  Respiratory: Not using accessory muscles. Good inspiratory effort. Clear to auscultation bilaterally, no wheeze or crackles. GI: Abdomen soft, no tenderness, not distended, normal bowel sounds  Musculoskeletal: No cyanosis in digits, neck supple  Neurology: CN 2-12 grossly intact. No speech or motor deficits  Psych: Normal affect. Alert and oriented in time, place and person  Skin: Warm, dry, normal turgor    Labs and Tests:  CBC:   Recent Labs     11/08/22  1457 11/11/22  0527   WBC 17.5* 15.6*   HGB 12.5* 12.6*    228     BMP:    Recent Labs     11/09/22  0648 11/10/22  0559 11/11/22  0527    138 142   K 4.3 4.2 4.8    109 111*   CO2 17* 16* 18*   BUN 36* 28* 33*   CREATININE 1.6* 1.4* 1.6*   GLUCOSE 100* 113* 113*     Hepatic:   Recent Labs     11/08/22  1457   *   ALT 33   BILITOT 0.6   ALKPHOS 37*       Discussed care with patient             Problem List  Principal Problem:    Sepsis ([facility] [address]  Resolved Problems:    * No resolved hospital problems. *       Assessment & Plan:   Acute hypoxic respiratory failure  Likely secondary to COVID-19  -worsening now on 10 l  - consult pulm   - palliative care consult   - continu iv decadron   -Breathing treatment as needed    CLL  -Continue management per hematology    Acute kidney injury  = Improving slowly creatinine is down to 1.6  Appreciate nephrology recommendation      Diet: ADULT DIET;  Regular  ADULT ORAL NUTRITION SUPPLEMENT; Lunch, Dinner; Standard High Calorie/High Protein Oral Supplement  Code:Full Code  DVT PPX lovejosex       Brenda Downey MD   11/11/2022 7: 07 AM

## 2022-11-11 NOTE — PROGRESS NOTES
Patient breathing heavily with Spo2 86% on 14lpm O2. Placed patient on Aervo 60L 80% Spo2 at 92%.  RN made aware

## 2022-11-11 NOTE — PROGRESS NOTES
[facility]   Progress Note      SUBJECTIVE:      Events noted  The patient was transferred to ICU due to increasing oxygen requirement. No external bleeding  No fever    OBJECTIVE    Physical  VITALS:  /69   Pulse 80   Temp 98 °F (36.7 °C) (Temporal)   Resp 16   Ht 6' 2\" (1.88 m)   Wt 222 lb 3.2 oz (100.8 kg)   SpO2 95%   BMI 28.53 kg/m²   TEMPERATURE:  Current - Temp: 98 °F (36.7 °C); Max - Temp  Av.5 °F (36.9 °C)  Min: 97.4 °F (36.3 °C)  Max: 99.7 °F (37.6 °C)  BLOOD PRESSURE RANGE:  Systolic (27VLD), LMF:216 , Min:126 , LHY:869   ; Diastolic (88KAS), LENARD:70, Min:62, Max:74    24HR INTAKE/OUTPUT:    Intake/Output Summary (Last 24 hours) at 2022 1821  Last data filed at 2022 1212  Gross per 24 hour   Intake --   Output 50 ml   Net -50 ml     Conscious alert  Appears to be in respiratory distress.   Abdomen scaphoid  Extremities no edema      Data  Labs:  General Labs:  CBC with Differential:    Lab Results   Component Value Date/Time    WBC 15.6 2022 05:27 AM    RBC 4.63 2022 05:27 AM    HGB 12.6 2022 05:27 AM    HCT 38.6 2022 05:27 AM     2022 05:27 AM    MCV 83.4 2022 05:27 AM    MCH 27.1 2022 05:27 AM    MCHC 32.5 2022 05:27 AM    RDW 17.6 2022 05:27 AM    SEGSPCT 72.7 2012 10:28 AM    LYMPHOPCT 34.8 2022 05:27 AM    MONOPCT 3.5 2022 05:27 AM    EOSPCT 1.7 2012 02:50 PM    BASOPCT 0.8 2022 05:27 AM    MONOSABS 0.5 2022 05:27 AM    LYMPHSABS 5.4 2022 05:27 AM    EOSABS 0.0 2022 05:27 AM    BASOSABS 0.1 2022 05:27 AM    DIFFTYPE Auto-K 2012 10:28 AM     BMP:    Lab Results   Component Value Date/Time     2022 05:27 AM    K 4.8 2022 05:27 AM    K 4.8 2022 02:57 PM     2022 05:27 AM    CO2 18 2022 05:27 AM    BUN 33 2022 05:27 AM    LABALBU 3.1 2022 05:27 AM    CREATININE 1.6 2022 05:27 AM CALCIUM 9.3 11/11/2022 05:27 AM    GFRAA 53 06/02/2022 02:35 PM    GFRAA >60 01/22/2013 09:16 AM    LABGLOM 41 11/11/2022 05:27 AM    GLUCOSE 113 11/11/2022 05:27 AM     Hepatic Function Panel:    Lab Results   Component Value Date/Time    ALKPHOS 37 11/08/2022 02:57 PM    ALT 33 11/08/2022 02:57 PM     11/08/2022 02:57 PM    PROT 6.9 11/08/2022 02:57 PM    PROT 7.0 01/22/2013 09:16 AM    BILITOT 0.6 11/08/2022 02:57 PM    LABALBU 3.1 11/11/2022 05:27 AM     LDH:  No results found for: LDH  PT/INR:    Lab Results   Component Value Date/Time    PROTIME 12.6 06/25/2010 06:30 AM    INR 1.19 06/25/2010 06:30 AM     PTT:    Lab Results   Component Value Date/Time    APTT 34.6 06/25/2010 06:30 AM   [APTT    Current Medications  Current Facility-Administered Medications: dexamethasone (DECADRON) 20 mg in sodium chloride 0.9 % 50 mL IVPB, 20 mg, IntraVENous, Q24H  sodium bicarbonate 100 mEq in dextrose 5 % 1,000 mL infusion, , IntraVENous, Continuous  albuterol sulfate HFA (PROVENTIL;VENTOLIN;PROAIR) 108 (90 Base) MCG/ACT inhaler 2 puff, 2 puff, Inhalation, BID  ipratropium (ATROVENT HFA) 17 MCG/ACT inhaler 2 puff, 2 puff, Inhalation, BID  allopurinol (ZYLOPRIM) tablet 100 mg, 100 mg, Oral, Daily  donepezil (ARICEPT) tablet 10 mg, 10 mg, Oral, Nightly  ibrutinib (IMBRUVICA) chemo tablet 420 mg PATIENT SUPPLIED (Patient Supplied), 420 mg, Oral, Nightly  levothyroxine (SYNTHROID) tablet 100 mcg, 100 mcg, Oral, Daily  primidone (MYSOLINE) tablet 50 mg, 50 mg, Oral, Daily  propranolol (INDERAL) tablet 20 mg, 20 mg, Oral, Daily  cefepime (MAXIPIME) 2000 mg IVPB minibag, 2,000 mg, IntraVENous, Q12H  sodium chloride flush 0.9 % injection 5-40 mL, 5-40 mL, IntraVENous, 2 times per day  sodium chloride flush 0.9 % injection 5-40 mL, 5-40 mL, IntraVENous, PRN  0.9 % sodium chloride infusion, , IntraVENous, PRN  enoxaparin Sodium (LOVENOX) injection 30 mg, 30 mg, SubCUTAneous, BID  ondansetron (ZOFRAN-ODT) disintegrating tablet 4 mg, 4 mg, Oral, Q8H PRN **OR** ondansetron (ZOFRAN) injection 4 mg, 4 mg, IntraVENous, Q6H PRN  polyethylene glycol (GLYCOLAX) packet 17 g, 17 g, Oral, Daily PRN  acetaminophen (TYLENOL) tablet 650 mg, 650 mg, Oral, Q6H PRN **OR** acetaminophen (TYLENOL) suppository 650 mg, 650 mg, Rectal, Q6H PRN    ASSESSMENT AND PLAN    70-year-old gentleman who was admitted in the hospital with COVID pneumonia has    1. Chronic lymphocytic leukemia:    -Diagnosed in February 2021  -On ibrutinib for 20 mg p.o. daily. Okay to continue while inpatient  -White count at baseline - 15-17K    2.   COVID pneumonia:    -Management per primary/ICU team      Chapito Carbone MD

## 2022-11-11 NOTE — PROGRESS NOTES
Pt transferred to ICU bed 15 report called to [facility]. Family in [place] updated stated she would disperse info. Change in pt resp status.   Pt placed on Aivo 60%

## 2022-11-12 PROBLEM — I48.92 ATRIAL FLUTTER BY ELECTROCARDIOGRAM (HCC): Status: ACTIVE | Noted: 2022-01-01

## 2022-11-12 PROBLEM — J96.01 ACUTE RESPIRATORY FAILURE WITH HYPOXIA (HCC): Status: ACTIVE | Noted: 2022-01-01

## 2022-11-12 PROBLEM — J12.82 PNEUMONIA DUE TO COVID-19 VIRUS: Status: ACTIVE | Noted: 2022-01-01

## 2022-11-12 PROBLEM — J18.9 COMMUNITY ACQUIRED PNEUMONIA: Status: ACTIVE | Noted: 2022-01-01

## 2022-11-12 PROBLEM — U07.1 PNEUMONIA DUE TO COVID-19 VIRUS: Status: ACTIVE | Noted: 2022-01-01

## 2022-11-12 PROBLEM — Z85.6 HISTORY OF CHRONIC LYMPHOCYTIC LEUKEMIA: Status: ACTIVE | Noted: 2022-01-01

## 2022-11-12 NOTE — CONSULTS
[facility]  Advanced CHF/Pulmonary Hypertension   Cardiac Evaluation      Barbara Patel  YOB: 1934    Requesting PHysician:  Dr. Tamayo Monday      Chief Complaint   Patient presents with    Illness     Wife concerned for \"illness\" with , PCP requesting xray. Cough     X3 days         History of Present Illness:  Aleah Galan is an 81 yo male with a PMH of CLL, hypertension, prostate cancer who presented 11/8/22 with cough, fever, and SOB. He was diagnosed with COVID pneumonia. Since admission, his oxygen requirement were slowly deteriorating. Yesterday, his oxygen sat was 84% on 8 liters. He was increased to 14 liters. He was started on Decadron for COVID pneumonia. He was also noted to have ROSIE and was on bicarbonate drip. He was moved to the ICU yesterday due to his oxygen requirements. He remains on airVO at 80%. He is weak and deconditioned. He continues to have intermittent cough. His inflammation markers are elevated. He was noted on his admission EKG to be in atrial flutter with controlled rate. His last EKG in the system showed sinus rhythm in 2018. He has remained in atrial flutter since admission. We are consulted for his atrial flutter. His bnp on admission was 607. His atrial flutter rate is controlled. He continues on high doses of oxygen.     Labs:  Sodium 141  K 4.7  BUN/cre 37/1.3  Bnp 607  Troponin 0.01  H/H 11.4/34.9    EKG:  atrial flutter with controlled rate    Meds prior to admission:  Amlodipine 10 qd  Torsemide 20 qd  Kcl 10 qd  Propranolol 20 qd        Allergies   Allergen Reactions    Oxycodone-Acetaminophen Other (See Comments)     halucinations  Other reaction(s): hallucinations    Adhesive Tape      Skin becomes raw     Current Facility-Administered Medications   Medication Dose Route Frequency Provider Last Rate Last Admin    dexamethasone (DECADRON) 20 mg in sodium chloride 0.9 % 50 mL IVPB  20 mg IntraVENous Q24H Linda Diana Rain MD   Stopped at 11/12/22 1243    albuterol sulfate HFA (PROVENTIL;VENTOLIN;PROAIR) 108 (90 Base) MCG/ACT inhaler 2 puff  2 puff Inhalation BID Devendra Jensen MD   2 puff at 11/12/22 0851    ipratropium (ATROVENT HFA) 17 MCG/ACT inhaler 2 puff  2 puff Inhalation BID Marianne Caruso MD   2 puff at 11/12/22 0851    allopurinol (ZYLOPRIM) tablet 100 mg  100 mg Oral Daily Devendra Jensen MD   100 mg at 11/12/22 0918    donepezil (ARICEPT) tablet 10 mg  10 mg Oral Nightly Devendra Jensen MD   10 mg at 11/11/22 2042    ibrutinib (IMBRUVICA) chemo tablet 420 mg PATIENT SUPPLIED (Patient Supplied)  420 mg Oral Nightly Marianne Caruso MD        levothyroxine (SYNTHROID) tablet 100 mcg  100 mcg Oral Daily Devendra Jensen MD   100 mcg at 11/12/22 0615    primidone (MYSOLINE) tablet 50 mg  50 mg Oral Daily Devendra Jensen MD   50 mg at 11/12/22 0918    propranolol (INDERAL) tablet 20 mg  20 mg Oral Daily Devendra Jensen MD   20 mg at 11/12/22 0917    cefepime (MAXIPIME) 2000 mg IVPB minibag  2,000 mg IntraVENous Q12H Marianne Caruso MD   Stopped at 11/12/22 0543    sodium chloride flush 0.9 % injection 5-40 mL  5-40 mL IntraVENous 2 times per day Marianne Caruso MD   10 mL at 11/12/22 0949    sodium chloride flush 0.9 % injection 5-40 mL  5-40 mL IntraVENous PRN Devendra Jensen MD        0.9 % sodium chloride infusion   IntraVENous PRN Marianne Caruso MD 5 mL/hr at 11/12/22 0454 Rate Verify at 11/12/22 0454    enoxaparin Sodium (LOVENOX) injection 30 mg  30 mg SubCUTAneous BID Devendra Jensen MD   30 mg at 11/12/22 0917    ondansetron (ZOFRAN-ODT) disintegrating tablet 4 mg  4 mg Oral Q8H PRN Devendra Jensen MD        Or    ondansetron (ZOFRAN) injection 4 mg  4 mg IntraVENous Q6H PRN Devendra Jensen MD        polyethylene glycol (GLYCOLAX) packet 17 g  17 g Oral Daily PRN Marianne Caruso MD        acetaminophen (TYLENOL) tablet 650 mg  650 mg Oral Q6H PRN Devendra Jensen MD   650 mg at 11/12/22 0948    Or    acetaminophen (TYLENOL) suppository 650 mg 650 mg Rectal Q6H PRN Renato Arizmendi MD           Past Medical History:   Diagnosis Date    Bilateral cataracts     Bladder stones     Blepharitis     Cancer (HCC)     prostate    Hypertension     Kidney stones     with bladder stones    Obesity     Pneumonia     SOB (shortness of breath)      Past Surgical History:   Procedure Laterality Date    CHOLECYSTECTOMY           COLONOSCOPY      polypectomy    CYSTOSCOPY  12    left retrograde    EYE SURGERY      OTHER SURGICAL HISTORY  12    left ureteral lithotomy    PROSTATECTOMY      with abd panniculectomy     SKIN CANCER EXCISION      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     Family History   Problem Relation Age of Onset    Dementia Mother     Other Mother [de-identified]        pneumonia    High Blood Pressure Mother     High Cholesterol Mother     Bleeding Prob Father 47        blood poisoning    Heart Disease Neg Hx      Social History     Socioeconomic History    Marital status:      Spouse name: Not on file    Number of children: Not on file    Years of education: Not on file    Highest education level: Not on file   Occupational History    Not on file   Tobacco Use    Smoking status: Former     Packs/day: 1.00     Years: 38.00     Pack years: 38.00     Types: Cigarettes     Quit date: 1984     Years since quittin.4    Smokeless tobacco: Never   Vaping Use    Vaping Use: Never used   Substance and Sexual Activity    Alcohol use: No    Drug use: No    Sexual activity: Not on file   Other Topics Concern    Not on file   Social History Narrative    Not on file     Social Determinants of Health     Financial Resource Strain: Low Risk     Difficulty of Paying Living Expenses: Not hard at all   Food Insecurity: No Food Insecurity    Worried About Running Out of Food in the Last Year: Never true    Ran Out of Food in the Last Year: Never true   Transportation Needs: Not on file   Physical Activity: Not on file   Stress: Not on file   Social Connections: Not on file   Intimate Partner Violence: Not on file   Housing Stability: Not on file       Review of Systems:   Constitutional: there has been no unanticipated weight loss. There's been no change in energy level, sleep pattern, or activity level. Eyes: No visual changes or diplopia. No scleral icterus. ENT: No Headaches, hearing loss or vertigo. No mouth sores or sore throat. Cardiovascular: Reviewed in HPI  Respiratory: No cough or wheezing, no sputum production. No hematemesis. Gastrointestinal: No abdominal pain, appetite loss, blood in stools. No change in bowel or bladder habits. Genitourinary: No dysuria, trouble voiding, or hematuria. Musculoskeletal:  No gait disturbance, weakness or joint complaints. Integumentary: No rash or pruritis. Neurological: No headache, diplopia, change in muscle strength, numbness or tingling. No change in gait, balance, coordination, mood, affect, memory, mentation, behavior. Psychiatric: No anxiety, no depression. Endocrine: No malaise, fatigue or temperature intolerance. No excessive thirst, fluid intake, or urination. No tremor. Hematologic/Lymphatic: No abnormal bruising or bleeding, blood clots or swollen lymph nodes. Allergic/Immunologic: No nasal congestion or hives. Physical Examination:    Vitals:    11/12/22 0900 11/12/22 1000 11/12/22 1200 11/12/22 1300   BP: 131/71 128/85 109/60 115/61   Pulse: 77 85 80 79   Resp: 22 (!) 33 17 17   Temp:  97.7 °F (36.5 °C) 97.7 °F (36.5 °C)    TempSrc:  Temporal Temporal    SpO2: 98% (!) 89% 96% 95%   Weight:       Height:         Body mass index is 28.39 kg/m².      Wt Readings from Last 3 Encounters:   11/12/22 221 lb 1.9 oz (100.3 kg)   09/02/22 234 lb 6.4 oz (106.3 kg)   06/02/22 240 lb (108.9 kg)     BP Readings from Last 3 Encounters:   11/12/22 115/61   09/02/22 124/70   06/30/22 124/63     Constitutional and General Appearance:   Chronically and acutely ill appearing, with high doses of interval history, physical exam, review of data including labs, imaging, development and implementation of treatment plan and coordination of complex care. More than 50% of the time was devoted to counseling the patient on their diagnoses/treatments, as well as coordination of care with the other care teams      I appreciate the opportunity of cooperating in the care of this patient.     Lino Cordero M.D., [facility] [place]

## 2022-11-12 NOTE — PLAN OF CARE
Problem: Discharge Planning  Goal: Discharge to home or other facility with appropriate resources  11/12/2022 1132 by Perez Chaidez RN  Outcome: Progressing  Flowsheets (Taken 11/12/2022 0800)  Discharge to home or other facility with appropriate resources: Refer to discharge planning if patient needs post-hospital services based on physician order or complex needs related to functional status, cognitive ability or social support system  11/11/2022 2151 by Manpreet Larson RN  Outcome: Progressing     Problem: Pain  Goal: Verbalizes/displays adequate comfort level or baseline comfort level  11/12/2022 1132 by Perez Chaidez RN  Outcome: Progressing  11/11/2022 2151 by Manpreet Larson RN  Outcome: Progressing     Problem: Skin/Tissue Integrity  Goal: Absence of new skin breakdown  Description: 1. Monitor for areas of redness and/or skin breakdown  2. Assess vascular access sites hourly  3. Every 4-6 hours minimum:  Change oxygen saturation probe site  4. Every 4-6 hours:  If on nasal continuous positive airway pressure, respiratory therapy assess nares and determine need for appliance change or resting period.   11/12/2022 1132 by Perez Chaidez RN  Outcome: Progressing  11/11/2022 2151 by Manpreet Larson RN  Outcome: Progressing     Problem: ABCDS Injury Assessment  Goal: Absence of physical injury  11/12/2022 1132 by Perez Chaidez RN  Outcome: Progressing  11/11/2022 2151 by Manpreet Larson RN  Outcome: Progressing     Problem: Safety - Adult  Goal: Free from fall injury  11/12/2022 1132 by Perez Chaidez RN  Outcome: Progressing  11/11/2022 2151 by Manpreet Larson RN  Outcome: Progressing     Problem: Respiratory - Adult  Goal: Achieves optimal ventilation and oxygenation  11/12/2022 1132 by Perez Chaidez RN  Outcome: Progressing  11/11/2022 2151 by Manpreet Larson RN  Outcome: Progressing     Problem: Cardiovascular - Adult  Goal: Maintains optimal cardiac output and hemodynamic stability  11/12/2022 1132 by Erik Serrato RN  Outcome: Progressing  11/11/2022 2151 by Macrina Santana RN  Outcome: Progressing     Problem: Skin/Tissue Integrity - Adult  Goal: Skin integrity remains intact  11/12/2022 1132 by Erik Serrato RN  Outcome: Progressing  Flowsheets (Taken 11/11/2022 2152 by Macrina Santana RN)  Skin Integrity Remains Intact:   Monitor for areas of redness and/or skin breakdown   Assess vascular access sites hourly   Every 4-6 hours minimum: Change oxygen saturation probe site   Every 4-6 hours: If on nasal continuous positive airway pressure, respiratory therapy assesses nares and determine need for appliance change or resting period  11/11/2022 2151 by Macrina Santana RN  Outcome: Progressing     Problem: Musculoskeletal - Adult  Goal: Return mobility to safest level of function  11/12/2022 1132 by Erik Serrato RN  Outcome: Progressing  Flowsheets (Taken 11/12/2022 0800)  Return Mobility to Safest Level of Function:   Assess patient stability and activity tolerance for standing, transferring and ambulating with or without assistive devices   Assist with transfers and ambulation using safe patient handling equipment as needed   Ensure adequate protection for wounds/incisions during mobilization   Obtain physical therapy/occupational therapy consults as needed   Apply continuous passive motion per provider or physical therapy orders to increase flexion toward goal   Instruct patient/family in ordered activity level  11/11/2022 2151 by Macrina Santana RN  Outcome: Progressing     Problem: Gastrointestinal - Adult  Goal: Maintains or returns to baseline bowel function  11/12/2022 1132 by Erik Serrato RN  Outcome: Progressing  11/11/2022 2151 by Macrina Santana RN  Outcome: Progressing     Problem: Infection - Adult  Goal: Absence of infection at discharge  11/12/2022 1132 by Erik Serrato RN  Outcome: Progressing  Flowsheets (Taken 11/12/2022 0800)  Absence of infection at discharge:   Assess and monitor for signs and symptoms of infection   Monitor lab/diagnostic results   Monitor all insertion sites i.e., indwelling lines, tubes and drains   Monitor endotracheal (as able) and nasal secretions for changes in amount and color   [place] appropriate cooling/warming therapies per order   Administer medications as ordered   Instruct and encourage patient and family to use good hand hygiene technique   Identify and instruct in appropriate isolation precautions for identified infection/condition  11/11/2022 2151 by Blas Sepulveda RN  Outcome: Progressing     Problem: Nutrition Deficit:  Goal: Optimize nutritional status  11/12/2022 1132 by Kashif Menon RN  Outcome: Progressing  11/11/2022 2151 by Blas Sepulveda RN  Outcome: Progressing

## 2022-11-12 NOTE — PROGRESS NOTES
MD Tommy Dempsey MD Margery Curia, MD                  Office: [phone]                      Fax: [phone]              [facility]                   NEPHROLOGY INPATIENT PROGRESS NOTE:     PATIENT NAME: Mane Cardoza  : 1934  MRN: [id number]      RECOMMENDATIONS:   - pulmonary fup for worsening hypoxia, COVID-19 mx   - multifocal pneumonia pic on CT, no major fluid overload   - hold Torsemide     - can use PRN IV Lasix   -Stop chloride bicarb infusion  - COVID-19 mx as per pulmonary team  - trend serum uric acid  -f/up w/ hem-onc   - monitor CBC w/ h/o CLL-diagnosed in 2021, was on ibrutinib  - IV antibiotic: Vancomycin: trough goal <15, pharmacy team assisting.   - monitor PVR w/ bladder scan for willis insertion need. - at higher risk for decompensation, needing closer monitoring.     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):   - BL Scr- lowest recently 1.4-1.5, as off 22   ->  1.8 on admission  - Etiology of ROSIE - presumed pre-renal now    - other differentials: unlikely  GN / TI / TMA process  - UA : needs it  - Renal imaging: on : 3-2021 w IV dye CT - Tiny nonobstructing right renal stone      Associated problems:   - Volume status: mild hypo-volemic  : HTN : no need for tight control    : Na: hyponatremia - mild 130 on admission, no recent h/o hypoNa, so likely acute, so should be ok for acute reversal     - Azotemia: pre-renal   - Electrolytes: K: WNL  - Acid-Base: acidosis, non-AGMA 15 on admission, LA WNL- checked blood gas - likely [email or web address]    - Anemia: of chronic disease        Other major problems: Management per primary and other consulting teams. H/O CLL     - CT chest -   Consolidation in the mid to lower lungs is compatible with multifocal pneumonia. Suspected right hilar lymphadenopathy is likely reactive. Suggest follow-up contrast-enhanced chest CT within 3 months to assess for resolution. -- would need fup w/ PCP      Hospital Problems             Last Modified POA    * (Principal) Sepsis ([facility] 11/8/2022 Yes   : other supportive care :   - Check daily renal function panel with electrolytes-phosphorus  - Strict monitoring of I/Os, daily weight  - Renal feeds/diet  - Current medications reviewed. - Nephrotoxic medications have been discontinued. - Dose adjusted and appropriate. - Dose meds for eGFR <15 mL/min/1.73m2 during ROSIE    - Avoid heavy opioids due to renal failure - may use very low dose dilaudid / fentanyl with close monitoring of CNS and respiratory depression. Please refer to the orders. High Complexity. Multiple complex problems. Discussed with patient and treatment team-    Time spent > 30 (~35) minutes. Thank you for allowing me to participate in this patient's care. Please do not hesitate to contact me anytime. We will follow along with you. Jamil Sy MD,  [facility] [address] [place] [address] [place]: [phone] or [employer]  Fax: [phone]        =======================================================================================   =======================================================================================  Subjective / interval history: worsening hypoxia, needing more O2   Overall still worsening hypoxia requirement increasing,  Renal function better. Decreased urine output    Past medical, Surgical, Social, Family medical history reviewed by me. MEDICATIONS: reviewed by me. Medications Prior to Admission:  No current facility-administered medications on file prior to encounter. Current Outpatient Medications on File Prior to Encounter   Medication Sig Dispense Refill    amLODIPine (NORVASC) 10 MG tablet TAKE 1 TABLET BY MOUTH DAILY 90 tablet 3    torsemide (DEMADEX) 20 MG tablet TAKE 1 TABLET BY MOUTH DAILY 30 tablet 5    potassium chloride (KLOR-CON M) 10 MEQ extended release tablet TAKE 1 TABLET BY MOUTH DAILY 90 tablet 3    donepezil (ARICEPT) 10 MG tablet Take 1 tablet by mouth nightly 90 tablet 3    primidone (MYSOLINE) 50 MG tablet TAKE 1 TABLET BY MOUTH DAILY 90 tablet 3    levothyroxine (SYNTHROID) 100 MCG tablet Take 1 tablet by mouth Daily 90 tablet 3    propranolol (INDERAL) 20 MG tablet TAKE 1 TABLET BY MOUTH DAILY 90 tablet 3    allopurinol (ZYLOPRIM) 100 MG tablet Take 100 mg by mouth daily      ibrutinib (IMBRUVICA) 420 MG tablet Take 420 mg by mouth daily      Calcium Carb-Cholecalciferol (CALCIUM/VITAMIN D PO) Take by mouth 1200 mg qd      multivitamin (ANTIOXIDANT;PROSIGHT) TABS per tablet Take 1 tablet by mouth daily.            Current Facility-Administered Medications:     dexamethasone (DECADRON) 20 mg in sodium chloride 0.9 % 50 mL IVPB, 20 mg, IntraVENous, Q24H, Tristan Burt MD, Stopped at 11/11/22 1305    albuterol sulfate HFA (PROVENTIL;VENTOLIN;PROAIR) 108 (90 Base) MCG/ACT inhaler 2 puff, 2 puff, Inhalation, BID, Devendra Jensen MD, 2 puff at 11/12/22 0851    ipratropium (ATROVENT HFA) 17 MCG/ACT inhaler 2 puff, 2 puff, Inhalation, BID, Devendra Jensen MD, 2 puff at 11/12/22 0851    allopurinol (ZYLOPRIM) tablet 100 mg, 100 mg, Oral, Daily, Devendra Jensen MD, 100 mg at 11/12/22 0918    donepezil (ARICEPT) tablet 10 mg, 10 mg, Oral, Nightly, Devendra Jensen MD, 10 mg at 11/11/22 2042    ibrutinib (IMBRUVICA) chemo tablet 420 mg PATIENT SUPPLIED (Patient Supplied), 420 mg, Oral, Nightly, Devendra Jensen MD    levothyroxine (SYNTHROID) tablet 100 mcg, 100 mcg, Oral, Daily, Korey Castaneda MD, 100 mcg at 11/12/22 0615    primidone (MYSOLINE) tablet 50 mg, 50 mg, Oral, Daily, Devendra Jensen MD, 50 mg at 11/12/22 0918    propranolol (INDERAL) tablet 20 mg, 20 mg, Oral, Daily, Korey Castaneda MD, 20 mg at 11/12/22 0917    cefepime (MAXIPIME) 2000 mg IVPB minibag, 2,000 mg, IntraVENous, Q12H, Devendra Mendoza MD, Stopped at 11/12/22 0543    sodium chloride flush 0.9 % injection 5-40 mL, 5-40 mL, IntraVENous, 2 times per day, Korey Castaneda MD, 10 mL at 11/12/22 0949    sodium chloride flush 0.9 % injection 5-40 mL, 5-40 mL, IntraVENous, PRN, Devendra Jensen MD    0.9 % sodium chloride infusion, , IntraVENous, PRN, Korey Castaneda MD, Last Rate: 5 mL/hr at 11/12/22 0454, Rate Verify at 11/12/22 0454    enoxaparin Sodium (LOVENOX) injection 30 mg, 30 mg, SubCUTAneous, BID, Devendra Jensen MD, 30 mg at 11/12/22 0917    ondansetron (ZOFRAN-ODT) disintegrating tablet 4 mg, 4 mg, Oral, Q8H PRN **OR** ondansetron (ZOFRAN) injection 4 mg, 4 mg, IntraVENous, Q6H PRN, Devendra Jensen MD    polyethylene glycol (GLYCOLAX) packet 17 g, 17 g, Oral, Daily PRN, Devendra Jensen MD    acetaminophen (TYLENOL) tablet 650 mg, 650 mg, Oral, Q6H PRN, 650 mg at 11/12/22 0948 **OR** acetaminophen (TYLENOL) suppository 650 mg, 650 mg, Rectal, Q6H PRN, Korey Castaneda MD         =======================================================================================     PHYSICAL EXAM:  Recent vital signs and recent I/Os reviewed by me.      Wt Readings from Last 3 Encounters:   11/12/22 221 lb 1.9 oz (100.3 kg)   09/02/22 234 lb 6.4 oz (106.3 kg)   06/02/22 240 lb (108.9 kg)     BP Readings from Last 3 Encounters:   11/12/22 139/71   09/02/22 124/70   06/30/22 124/63     Patient Vitals for the past 24 hrs:   BP Temp Temp src Pulse Resp SpO2 Weight   11/12/22 0851 -- -- -- -- 17 97 % --   11/12/22 0800 139/71 -- -- 86 18 99 % --   11/12/22 0400 (!) 120/54 98.3 °F (36.8 °C) Temporal 67 17 99 % 221 lb 1.9 oz (100.3 kg) 11/12/22 0343 -- -- -- 81 20 99 % --   11/12/22 0200 (!) 105/55 -- -- 72 20 96 % --   11/12/22 0017 -- -- -- -- 23 95 % --   11/12/22 0000 110/71 97.1 °F (36.2 °C) Temporal 85 17 94 % --   11/11/22 2200 131/62 -- -- 83 18 97 % --   11/11/22 2103 -- -- -- 81 19 97 % --   11/11/22 2100 136/72 -- -- 84 19 91 % --   11/11/22 2000 121/69 97 °F (36.1 °C) Temporal 77 15 93 % --   11/11/22 1900 119/63 -- -- 71 17 96 % --   11/11/22 1600 128/69 98 °F (36.7 °C) Temporal 80 16 95 % --   11/11/22 1304 -- -- -- 85 20 96 % --   11/11/22 1212 126/69 98.5 °F (36.9 °C) Temporal 93 25 97 % --   11/11/22 1129 -- -- -- 92 24 91 % --   11/11/22 1120 134/66 99.7 °F (37.6 °C) Oral 85 22 90 % --         Intake/Output Summary (Last 24 hours) at 11/12/2022 1039  Last data filed at 11/12/2022 0454  Gross per 24 hour   Intake 2282.03 ml   Output 50 ml   Net 2232.03 ml           Physical Exam  Vitals reviewed. Constitutional:       General: He is   in acute distress. Appearance: Normal appearance. He is ill-appearing. HENT:      Head: Normocephalic and atraumatic. Right Ear: External ear normal.      Left Ear: External ear normal.      Nose: Nose normal.      Mouth/Throat:      Mouth: Mucous membranes are moist. Mucous membranes are not dry. Eyes:      General: No scleral icterus. Conjunctiva/sclera: Conjunctivae normal.   Neck:      Vascular: No JVD. Cardiovascular:      Rate and Rhythm: Normal rate and regular rhythm. Heart sounds: S1 normal and S2 normal.   Pulmonary:      Effort: Pulmonary effort is ab-normal. . Needing NIPPV     Breath sounds: Rhonchi present. Abdominal:      General: Bowel sounds are normal. There is   distension. Musculoskeletal:         General: Swelling (mild) present. No deformity. Cervical back: Normal range of motion and neck supple. Skin:     General: Skin is dry. Coloration: Skin is not jaundiced.    Neurological:      Mental Status: Drowsy, arouses able,  Psychiatric: Unable to fully obtain    =======================================================================================     DATA:  Diagnostic tests reviewed by me for today's visit:   (AS NEEDED FOR MY EVALUATION AND MANAGEMENT). Recent Labs     11/11/22 0527 11/12/22 0623   WBC 15.6* 14.7*   HCT 38.6* 34.9*    206       Iron Saturation:  No components found for: PERCENTFE  FERRITIN:  No results found for: FERRITIN  IRON:  No results found for: IRON  TIBC:  No results found for: TIBC    Recent Labs     11/10/22  0559 11/11/22  0527 11/12/22  0623    142 141   K 4.2 4.8 4.7    111* 110   CO2 16* 18* 22   BUN 28* 33* 37*   CREATININE 1.4* 1.6* 1.3       Recent Labs     11/10/22  0559 11/11/22  0527 11/12/22  0623   CALCIUM 8.7 9.3 9.3   PHOS 2.9 1.3* 2.7       No results for input(s): PH, PCO2, PO2 in the last 72 hours.     Invalid input(s): Kayleigh Pal    ABG:  No results found for: PH, PCO2, PO2, HCO3, BE, THGB, TCO2, O2SAT  VBG:    Lab Results   Component Value Date/Time    PHVEN 7.396 11/10/2022 11:37 AM    IJJ7RHM 24.7 11/10/2022 11:37 AM    BEVEN -8.1 11/10/2022 11:37 AM    J7HTFXHI 100 11/10/2022 11:37 AM       LDH:  No results found for: LDH  Uric Acid:    Lab Results   Component Value Date/Time    LABURIC 7.9 11/10/2022 05:59 AM       PT/INR:    Lab Results   Component Value Date/Time    PROTIME 12.6 06/25/2010 06:30 AM    INR 1.19 06/25/2010 06:30 AM     Warfarin PT/INR:  No components found for: PTPATWAR, PTINRWAR  PTT:    Lab Results   Component Value Date/Time    APTT 34.6 06/25/2010 06:30 AM   [APTT}  Last 3 Troponin:    Lab Results   Component Value Date/Time    TROPONINI 0.01 11/08/2022 02:57 PM       U/A:    Lab Results   Component Value Date/Time    NITRITE neg 02/01/2018 03:01 PM    COLORU Yellow 11/09/2022 02:30 PM    PROTEINU 100 11/09/2022 02:30 PM    PHUR 6.0 11/09/2022 02:30 PM    WBCUA 1 11/09/2022 02:30 PM    RBCUA 0-2 11/09/2022 02:30 PM    BACTERIA 4+ 11/09/2022 02:30 PM    CLARITYU CLOUDY 11/09/2022 02:30 PM    SPECGRAV 1.020 11/09/2022 02:30 PM    LEUKOCYTESUR SMALL 11/09/2022 02:30 PM    UROBILINOGEN 1.0 11/09/2022 02:30 PM    BILIRUBINUR Negative 11/09/2022 02:30 PM    BILIRUBINUR neg 02/01/2018 03:01 PM    BLOODU SMALL 11/09/2022 02:30 PM    GLUCOSEU Negative 11/09/2022 02:30 PM    AMORPHOUS Present 11/09/2022 02:30 PM     Microalbumen/Creatinine ratio:  No components found for: RUCREAT  24 Hour Urine for Protein:  No components found for: RAWUPRO, UHRS3, MWHV31PG, UTV3  24 Hour Urine for Creatinine Clearance:  No components found for: CREAT4, UHRS10, UTV10  Urine Toxicology:  No components found for: IAMMENTA, IBARBIT, IBENZO, ICOCAINE, IMARTHC, IOPIATES, IPHENCYC    HgBA1c:  No results found for: LABA1C  RPR:  No results found for: RPR  HIV:  No results found for: HIV  [place]:  No results found for: ANATITER, [place]  RF:  No results found for: RF  DSDNA:  No components found for: DNA  AMYLASE:  No results found for: AMYLASE  LIPASE:  No results found for: LIPASE  Fibrinogen Level:  No components found for: FIB       BELOW MENTIONED RADIOLOGY STUDY RESULTS BY ME (AS NEEDED FOR MY EVALUATION AND MANAGEMENT). CT CHEST WO CONTRAST    Result Date: 11/8/2022  Consolidation in the mid to lower lungs is compatible with multifocal pneumonia. Suspected right hilar lymphadenopathy is likely reactive. Suggest follow-up contrast-enhanced chest CT within 3 months to assess for resolution. This report was transcribed using voice recognition software, mainly. So please excuse brevity and/or typos. Every effort was made to ensure accuracy, however, inadvertent computerized transcription errors may be present. Please contact us, if any questions or clarifications are needed.

## 2022-11-12 NOTE — PROGRESS NOTES
11/12/22 0851   Oxygen Therapy/Pulse Ox   O2 Device Heated high flow cannula   O2 Flow Rate (L/min) 50 L/min  (weaned from 60)   FiO2  80 %  (weaned from 85)   Resp 17   SpO2 97 %

## 2022-11-12 NOTE — PROGRESS NOTES
PULMONARY AND CRITICAL CARE MEDICINE PROGRESS NOTE      Subjective: Patient lying in bed in no apparent respiratory distress. Reports breathing is stable. Remains on AirVo at FiO2 of 80%, 50 L/min of flow saturating in mid 90s. Appears deconditioned and weak. Continues to also have intermittent cough with discolored expectoration. With movement easily desaturates. REVIEW OF SYSTEMS:   8 point review of system is negative except that mentioned in the subjective portion.     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     Social History     Socioeconomic History    Marital status:      Spouse name: Not on file    Number of children: Not on file    Years of education: Not on file    Highest education level: Not on file   Occupational History    Not on file   Tobacco Use    Smoking status: Former     Packs/day: 1.00     Years: 38.00     Pack years: 38.00     Types: Cigarettes     Quit date: 1984     Years since quittin.4    Smokeless tobacco: Never   Vaping Use    Vaping Use: Never used   Substance and Sexual Activity    Alcohol use: No    Drug use: No    Sexual activity: Not on file   Other Topics Concern    Not on file   Social History Narrative    Not on file     Social Determinants of Health     Financial Resource Strain: Low Risk     Difficulty of Paying Living Expenses: Not hard at all   Food Insecurity: No Food Insecurity    Worried About Running Out of Food in the Last Year: Never true    Ran Out of Food in the Last Year: Never true   Transportation Needs: Not on file   Physical Activity: Not on file   Stress: Not on file   Social Connections: Not on file   Intimate Partner Violence: Not on file   Housing Stability: Not on file     Family History   Problem Relation Age of Onset    Dementia Mother     Other Mother [de-identified]        pneumonia    High Blood Pressure Mother     High Cholesterol Mother     Bleeding Prob Father 47        blood poisoning    Heart Disease Neg Hx      Allergies   Allergen Reactions    Oxycodone-Acetaminophen Other (See Comments)     halucinations  Other reaction(s): hallucinations    Adhesive Tape      Skin becomes raw       MEDICATIONS:     Scheduled Meds:     dexamethasone  20 mg IntraVENous Q24H    albuterol sulfate HFA  2 puff Inhalation BID    ipratropium  2 puff Inhalation BID    allopurinol  100 mg Oral Daily    donepezil  10 mg Oral Nightly    ibrutinib  420 mg Oral Nightly    levothyroxine  100 mcg Oral Daily    primidone  50 mg Oral Daily    propranolol  20 mg Oral Daily    cefepime  2,000 mg IntraVENous Q12H    sodium chloride flush  5-40 mL IntraVENous 2 times per day    enoxaparin  30 mg SubCUTAneous BID     Current Infusions:    sodium chloride 5 mL/hr at 11/12/22 0454       PRN meds:  sodium chloride flush, sodium chloride, ondansetron **OR** ondansetron, polyethylene glycol, acetaminophen **OR** acetaminophen    PHYSICAL EXAM:    /61   Pulse 79   Temp 97.7 °F (36.5 °C) (Temporal)   Resp 17   Ht 6' 2\" (1.88 m)   Wt 221 lb 1.9 oz (100.3 kg)   SpO2 95%   BMI 28.39 kg/m²  I/O last 3 completed shifts: In: 2282 [I.V.:1942.3; IV Piggyback:339.8]  Out: 50 [Urine:50] No intake/output data recorded.     Intake/Output Summary (Last 24 hours) at 11/12/2022 1413  Last data filed at 11/12/2022 0454  Gross per 24 hour   Intake 2282.03 ml   Output --   Net 2282.03 ml         CONSTITUTIONAL: He is a 80y.o.-year-old who appears his stated age. He is mild respiratory distress. CARDIOVASCULAR: S1 S2 RRR. Without murmer  RESPIRATORY & CHEST: Lungs are clear to auscultation and percussion. No wheezing, no crackles. Good air movement  GASTROINTESTINAL & ABDOMEN: Soft, nontender, positive bowel sounds in all quadrants, non-distended, without hepatosplenomegaly. GENITOURINARY: Deferred. MUSCULOSKELETAL: No tenderness to palpation of the axial skeleton. There is no clubbing. No cyanosis. No edema of the lower extremities. SKIN OF BODY: No rash or jaundice. PSYCHIATRIC EVALUATION: Normal affect. Patient answers questions appropriately. HEMATOLOGIC/LYMPHATIC/ IMMUNOLOGIC: No palpable lymphadenopathy. NEUROLOGIC: Alert and oriented. Groslly non-focal. Motor strength is 5+/5 in all muscle groups. The patient has a normal sensorium globally. LABS:    Recent Labs     11/11/22  0527 11/12/22  0623   WBC 15.6* 14.7*   RBC 4.63 4.20   HGB 12.6* 11.4*   HCT 38.6* 34.9*   MCH 27.1 27.2   MCHC 32.5 32.7   RDW 17.6* 17.9*    206   MPV 8.1 8.2   NEUTOPHILPCT 60.9 72.0   MONOPCT 3.5 5.0   BASOPCT 0.8 0.0     Recent Labs     11/10/22  0559 11/11/22  0527 11/12/22  0623    142 141   K 4.2 4.8 4.7    111* 110   ANIONGAP 13 13 9   CO2 16* 18* 22   BUN 28* 33* 37*   CREATININE 1.4* 1.6* 1.3   CALCIUM 8.7 9.3 9.3   GLUCOSE 113* 113* 162*       No results for input(s): CKTOTAL, TROPONINI, TROPONINT, CKMBINDEX in the last 72 hours. IMAGING:  I reviewed the chest x-ray from 11/11/2022 and my interpretation as follows. Bilateral multifocal infiltrates noted. I reviewed the CT chest from 11/8/2022 and my interpretation is as follows. Bilateral multifocal infiltrates consistent with COVID-pneumonia.       IMPRESSION:    Acute hypoxic respiratory failure  Multifocal pneumonia due to COVID-19  Acute on chronic CKD   CLL, on ibrutinib      PLAN:  Patient is suffering with acute hypoxic respiratory failure due to multifocal pneumonia from COVID-19 infection. He remains on high oxygen requirements via AirVo at 80% FiO2 and 50 L/min of flow. Patient saturating in mid 90s. Should be able to titrate the FiO2 down to maintain SPO2 above 89%. Continue with Decadron t at 20 mg IV daily. Inflammatory markers elevated. Procalcitonin is also elevated. Patient remains on IV cefepime for suspicion of superadded bacterial infection. Patient may not be a candidate of Actemra for concern for possible superimposed bacterial infection. Patient also presented with acute on chronic CKD with baseline creatinine 1.4-1.5. Serum creatinine has improved. Urine output remains adequate. Nephrology on board. Patient is fully vaccinated for COVID and also received a booster dose in November 2021. We may be having limited options available to improve his lung function if he fails to respond to current therapy. Will get palliative care involved in order to discuss goals of care and CODE STATUS. Total critical care time caring for this patient with life threatening illness, including direct patient contact, management of life support systems, review of data including imaging and labs, discussions with other team members and physicians is at least 32 minutes so far today, excluding procedures. Abhinav Gupta MD   Pulmonary Critical Care and Sleep Medicine  [address] [facility]   [address]  11/8/2022, 2:19 PM      This note was completed using dragon medical speech recognition software. Grammatical errors, random word insertions, pronoun errors and incomplete sentences are occasional consequences of this technology due to software limitations. If there are questions or concerns about the content of this note of information contained within the body of this dictation, they should be addressed with the provider for clarification.

## 2022-11-12 NOTE — PROGRESS NOTES
Assessment complete. VSS. NSR. Afebrile. Pt A&Ox4 with intermittent confusion. Pt denies any c/o pain. Pt's sats dropped to mid 80's and FiO2 increased from 80% to 85%. Pt tolerating and SpO2 @ 97%. Safety precautions in place. Call light within reach.

## 2022-11-13 PROBLEM — N18.9 ACUTE RENAL FAILURE SUPERIMPOSED ON CHRONIC KIDNEY DISEASE (HCC): Status: ACTIVE | Noted: 2022-01-01

## 2022-11-13 PROBLEM — A41.9 SEPSIS WITH ACUTE HYPOXIC RESPIRATORY FAILURE WITHOUT SEPTIC SHOCK (HCC): Status: ACTIVE | Noted: 2022-01-01

## 2022-11-13 PROBLEM — C91.10 CLL (CHRONIC LYMPHOCYTIC LEUKEMIA) (HCC): Status: ACTIVE | Noted: 2022-01-01

## 2022-11-13 PROBLEM — J96.01 SEPSIS WITH ACUTE HYPOXIC RESPIRATORY FAILURE WITHOUT SEPTIC SHOCK (HCC): Status: ACTIVE | Noted: 2022-01-01

## 2022-11-13 PROBLEM — R65.20 SEPSIS WITH ACUTE HYPOXIC RESPIRATORY FAILURE WITHOUT SEPTIC SHOCK (HCC): Status: ACTIVE | Noted: 2022-01-01

## 2022-11-13 PROBLEM — N17.9 ACUTE RENAL FAILURE SUPERIMPOSED ON CHRONIC KIDNEY DISEASE (HCC): Status: ACTIVE | Noted: 2022-01-01

## 2022-11-13 NOTE — RT PROTOCOL NOTE
RT Nebulizer Bronchodilator Protocol Note    There is a bronchodilator order in the chart from a provider indicating to follow the RT Bronchodilator Protocol and there is an Initiate RT Bronchodilator Protocol order as well (see protocol at bottom of note). CXR Findings:  No results found. The findings from the last RT Protocol Assessment were as follows:  Smoking: None or smoker <15 pack years  Respiratory Pattern: Regular pattern and RR 12-20 bpm  Breath Sounds: Slightly diminished and/or crackles  Cough: Weak, productive  Indication for Bronchodilator Therapy: Decreased or absent breath sounds  Bronchodilator Assessment Score: 4    Aerosolized bronchodilator medication orders have been revised according to the RT Nebulizer Bronchodilator Protocol below. Respiratory Therapist to perform RT Therapy Protocol Assessment initially then follow the protocol. Repeat RT Therapy Protocol Assessment PRN for score 0-3 or on second treatment, BID, and PRN for scores above 3. No Indications - adjust the frequency to every 6 hours PRN wheezing or bronchospasm, if no treatments needed after 48 hours then discontinue using Per Protocol order mode. If indication present, adjust the RT bronchodilator orders based on the Bronchodilator Assessment Score as indicated below. If a patient is on this medication at home then do not decrease Frequency below that used at home. 0-3 - enter or revise RT bronchodilator order(s) to equivalent RT Bronchodilator order with Frequency of every 4 hours PRN for wheezing or increased work of breathing using Per Protocol order mode. 4-6 - enter or revise RT Bronchodilator order(s) to two equivalent RT bronchodilator orders with one order with BID Frequency and one order with Frequency of every 4 hours PRN wheezing or increased work of breathing using Per Protocol order mode.          7-10 - enter or revise RT Bronchodilator order(s) to two equivalent RT bronchodilator orders with one order with TID Frequency and one order with Frequency of every 4 hours PRN wheezing or increased work of breathing using Per Protocol order mode. 11-13 - enter or revise RT Bronchodilator order(s) to one equivalent RT bronchodilator order with QID Frequency and an Albuterol order with Frequency of every 4 hours PRN wheezing or increased work of breathing using Per Protocol order mode. Greater than 13 - enter or revise RT Bronchodilator order(s) to one equivalent RT bronchodilator order with every 4 hours Frequency and an Albuterol order with Frequency of every 2 hours PRN wheezing or increased work of breathing using Per Protocol order mode. RT to enter RT Home Evaluation for COPD & MDI Assessment order using Per Protocol order mode.     Electronically signed by Javi Pascal RCP on 11/13/2022 at 12:40 PM

## 2022-11-13 NOTE — PROGRESS NOTES
PULMONARY AND CRITICAL CARE MEDICINE PROGRESS NOTE      Subjective: Patient lying in bed in no apparent respiratory distress. Continues to report stable breathing. Still on AirVo but FiO2 down to 70% with 50 L/min of flow. Patient saturating in low 90s. Appears deconditioned and weak. Continues to also have intermittent cough with discolored expectoration. With movement easily desaturates. REVIEW OF SYSTEMS:   8 point review of system is negative except that mentioned in the subjective portion.     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     Social History     Socioeconomic History    Marital status:      Spouse name: Not on file    Number of children: Not on file    Years of education: Not on file    Highest education level: Not on file   Occupational History    Not on file   Tobacco Use    Smoking status: Former     Packs/day: 1.00     Years: 38.00     Pack years: 38.00     Types: Cigarettes     Quit date: 1984     Years since quittin.4    Smokeless tobacco: Never   Vaping Use    Vaping Use: Never used   Substance and Sexual Activity    Alcohol use: No    Drug use: No    Sexual activity: Not on file   Other Topics Concern    Not on file   Social History Narrative    Not on file     Social Determinants of Health     Financial Resource Strain: Low Risk     Difficulty of Paying Living Expenses: Not hard at all   Food Insecurity: No Food Insecurity    Worried About Running Out of Food in the Last Year: Never true    Ran Out of Food in the Last Year: Never true   Transportation Needs: Not on file   Physical Activity: Not on file   Stress: Not on file   Social Connections: Not on file   Intimate Partner Violence: Not on file   Housing Stability: Not on file     Family History   Problem Relation Age of Onset    Dementia Mother     Other Mother [de-identified]        pneumonia    High Blood Pressure Mother     High Cholesterol Mother     Bleeding Prob Father 47        blood poisoning    Heart Disease Neg Hx      Allergies   Allergen Reactions    Oxycodone-Acetaminophen Other (See Comments)     halucinations  Other reaction(s): hallucinations    Adhesive Tape      Skin becomes raw       MEDICATIONS:     Scheduled Meds:     enoxaparin  1 mg/kg SubCUTAneous BID    cefepime  2,000 mg IntraVENous Q12H    dexamethasone  20 mg IntraVENous Q24H    albuterol sulfate HFA  2 puff Inhalation BID    ipratropium  2 puff Inhalation BID    allopurinol  100 mg Oral Daily    donepezil  10 mg Oral Nightly    ibrutinib  420 mg Oral Nightly    levothyroxine  100 mcg Oral Daily    primidone  50 mg Oral Daily    propranolol  20 mg Oral Daily    sodium chloride flush  5-40 mL IntraVENous 2 times per day     Current Infusions:    sodium chloride 5 mL/hr at 11/13/22 0400       PRN meds:  sodium chloride flush, sodium chloride, ondansetron **OR** ondansetron, polyethylene glycol, acetaminophen **OR** acetaminophen    PHYSICAL EXAM:    /70   Pulse 87   Temp 97.7 °F (36.5 °C) (Temporal)   Resp 13   Ht 6' 2\" (1.88 m)   Wt 220 lb 0.3 oz (99.8 kg)   SpO2 92%   BMI 28.25 kg/m²  I/O last 3 completed shifts: In: 3080 [P.O.:240; I.V.:2365.5; IV Piggyback:474.5]  Out: -  No intake/output data recorded.     Intake/Output Summary (Last 24 hours) at 11/13/2022 1337  Last data filed at 11/13/2022 0606  Gross per 24 hour   Intake 797.97 ml   Output --   Net 797.97 ml         CONSTITUTIONAL: He is a 80y.o.-year-old who appears his stated age. He is mild respiratory distress. CARDIOVASCULAR: S1 S2 RRR. Without murmer  RESPIRATORY & CHEST: Bilateral scattered coarse crackles heard. No wheezing heard. GASTROINTESTINAL & ABDOMEN: Soft, nontender, positive bowel sounds in all quadrants, non-distended, without hepatosplenomegaly. GENITOURINARY: Deferred. MUSCULOSKELETAL: No tenderness to palpation of the axial skeleton. There is no clubbing. No cyanosis. No edema of the lower extremities. SKIN OF BODY: No rash or jaundice. PSYCHIATRIC EVALUATION: Normal affect. Patient answers questions appropriately. HEMATOLOGIC/LYMPHATIC/ IMMUNOLOGIC: No palpable lymphadenopathy. NEUROLOGIC: Alert and oriented. Groslly non-focal. Motor strength is 5+/5 in all muscle groups. The patient has a normal sensorium globally. LABS:    Recent Labs     11/11/22 0527 11/12/22 0623 11/13/22 0413   WBC 15.6* 14.7* 13.2*   RBC 4.63 4.20 4.23   HGB 12.6* 11.4* 11.4*   HCT 38.6* 34.9* 35.1*   MCH 27.1 27.2 26.9   MCHC 32.5 32.7 32.3   RDW 17.6* 17.9* 17.5*    206 180   MPV 8.1 8.2 8.4   NEUTOPHILPCT 60.9 72.0 69.5   MONOPCT 3.5 5.0 3.8   BASOPCT 0.8 0.0 0.9     Recent Labs     11/11/22 0527 11/12/22 0623 11/13/22 0413    141 140   K 4.8 4.7 5.0   * 110 111*   ANIONGAP 13 9 7   CO2 18* 22 22   BUN 33* 37* 41*   CREATININE 1.6* 1.3 1.4*   CALCIUM 9.3 9.3 9.3   PROT  --   --  5.7*   BILITOT  --   --  0.4   ALKPHOS  --   --  45   ALT  --   --  60*   AST  --   --  43*   GLUCOSE 113* 162* 145*       No results for input(s): CKTOTAL, TROPONINI, TROPONINT, CKMBINDEX in the last 72 hours. IMAGING:  I reviewed the chest x-ray from 11/11/2022 and my interpretation as follows. Bilateral multifocal infiltrates noted. I reviewed the CT chest from 11/8/2022 and my interpretation is as follows. Bilateral multifocal infiltrates consistent with COVID-pneumonia.       IMPRESSION:    Acute hypoxic respiratory failure  Multifocal pneumonia due to COVID-19  Acute on chronic CKD   CLL, on ibrutinib  A flutter, rate controlled      PLAN:  Patient is suffering with acute hypoxic respiratory failure due to multifocal pneumonia from COVID-19 infection. He remains on high oxygen requirements via AirVo at 70% FiO2 and 50 L/min of flow. Patient saturating in mid 90s. Should be able to titrate the FiO2 down to maintain SPO2 above 89%. Continue with Decadron t at 20 mg IV daily. Inflammatory markers downtrending. Continue to monitor. Procalcitonin is also elevated. Patient remains on IV cefepime for suspicion of superadded bacterial infection. Patient may not be a candidate of Actemra for concern for possible superimposed bacterial infection. Patient also presented with acute on chronic CKD with baseline creatinine 1.4-1.5. Serum creatinine overall stable. Urine output remains adequate. Nephrology on board. Patient is fully vaccinated for COVID and also received a booster dose in November 2021. Also has history of CLL. Medical oncology on board. Patient restarted back on ibrutinib. Remains in atrial flutter. But rate is controlled. Cardiology input appreciated. Patient now on Lovenox therapeutic dosage due to a flutter. We may be having limited options available to improve his lung function if he fails to respond to current therapy. Will get palliative care involved in order to discuss goals of care and CODE STATUS. Total critical care time caring for this patient with life threatening illness, including direct patient contact, management of life support systems, review of data including imaging and labs, discussions with other team members and physicians is at least 32 minutes so far today, excluding procedures.         Valerie Barron MD   Pulmonary Critical Care and Sleep Medicine  [facility] AT [address]  11/8/2022, 1:37 PM      This note was completed using dragon medical speech recognition software. Grammatical errors, random word insertions, pronoun errors and incomplete sentences are occasional consequences of this technology due to software limitations. If there are questions or concerns about the content of this note of information contained within the body of this dictation, they should be addressed with the provider for clarification.

## 2022-11-13 NOTE — PROGRESS NOTES
Assessment complete. VSS. NSR. Afebrile. Pt A&Ox4. Pt denies any c/o pain. SpO2 @ 96% via 50 L Airvo and FiO2 @ 75%. Safety precautions in place. Call light within reach.

## 2022-11-13 NOTE — PROGRESS NOTES
MD Jase Breaux MD Marguerita Barre, MD                  Office: [phone]                      Fax: [phone]             [facility]                   NEPHROLOGY INPATIENT PROGRESS NOTE:     PATIENT NAME: Jose Acuna  : 1934  MRN: [id number]      RECOMMENDATIONS:   - pulmonary fup for worsening hypoxia, COVID-19 mx   - multifocal pneumonia pic on CT, no major fluid overload   - hold Torsemide     - can use PRN IV Lasix   -Stopped bicarb infusion  - COVID-19 mx as per pulmonary team  - trend serum uric acid  -f/up w/ hem-onc   - monitor CBC w/ h/o CLL-diagnosed in 2021, was on ibrutinib  - IV antibiotic: Vancomycin: trough goal <15, pharmacy team assisting.   - monitor PVR w/ bladder scan for willis insertion need. - at higher risk for decompensation, needing closer monitoring.     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):   - BL Scr- lowest recently 1.4-1.5, as off 22   ->  1.8 on admission  - Etiology of ROSIE - presumed pre-renal now    - other differentials: unlikely  GN / TI / TMA process  - UA : needs it  - Renal imaging: on : 3-2021 w IV dye CT - Tiny nonobstructing right renal stone      Associated problems:   - Volume status: mild hypo-volemic  : HTN : no need for tight control    : Na: hyponatremia - mild 130 on admission, no recent h/o hypoNa, so likely acute, so should be ok for acute reversal     - Azotemia: pre-renal   - Electrolytes: K: WNL  - Acid-Base: acidosis, non-AGMA 15 on admission, LA WNL- checked blood gas - likely [email or web address]    - Anemia: of chronic disease        Other major problems: Management per primary and other consulting teams. H/O CLL     - CT chest -   Consolidation in the mid to lower lungs is compatible with multifocal pneumonia. Suspected right hilar lymphadenopathy is likely reactive. Suggest follow-up contrast-enhanced chest CT within 3 months to assess for resolution. -- would need fup w/ PCP      Hospital Problems             Last Modified POA    * (Principal) Septicemia ([facility] 11/12/2022 Yes    Acute respiratory failure with hypoxia ([facility] 11/12/2022 Yes    Atrial fl[facility] by electrocardiogram ([facility] 11/12/2022 Yes    History of chronic lymphocytic leukemia 11/12/2022 Yes    Pneumonia due to COVID-19 virus 11/12/2022 Yes    Community acquired pneumonia 11/12/2022 Yes   : other supportive care :   - Check daily renal function panel with electrolytes-phosphorus  - Strict monitoring of I/Os, daily weight  - Renal feeds/diet  - Current medications reviewed. - Nephrotoxic medications have been discontinued. - Dose adjusted and appropriate. - Dose meds for eGFR <15 mL/min/1.73m2 during ROSIE    - Avoid heavy opioids due to renal failure - may use very low dose dilaudid / fentanyl with close monitoring of CNS and respiratory depression. Please refer to the orders. High Complexity. Multiple complex problems. Discussed with patient and treatment team-    Time spent > 30 (~35) minutes. Thank you for allowing me to participate in this patient's care. Please do not hesitate to contact me anytime. We will follow along with you.        Eileen Austin MD,  [facility] [address] [facility]: [phone] or [phone]  Fax: [phone]        ======================================================================================= =======================================================================================  Subjective / interval history: worsening hypoxia, needing more O2   Overall still worsening hypoxia requirement increasing,  Renal function stable overall. Decreased urine output    Past medical, Surgical, Social, Family medical history reviewed by me. MEDICATIONS: reviewed by me. Medications Prior to Admission:  No current facility-administered medications on file prior to encounter. Current Outpatient Medications on File Prior to Encounter   Medication Sig Dispense Refill    amLODIPine (NORVASC) 10 MG tablet TAKE 1 TABLET BY MOUTH DAILY 90 tablet 3    torsemide (DEMADEX) 20 MG tablet TAKE 1 TABLET BY MOUTH DAILY 30 tablet 5    potassium chloride (KLOR-CON M) 10 MEQ extended release tablet TAKE 1 TABLET BY MOUTH DAILY 90 tablet 3    donepezil (ARICEPT) 10 MG tablet Take 1 tablet by mouth nightly 90 tablet 3    primidone (MYSOLINE) 50 MG tablet TAKE 1 TABLET BY MOUTH DAILY 90 tablet 3    levothyroxine (SYNTHROID) 100 MCG tablet Take 1 tablet by mouth Daily 90 tablet 3    propranolol (INDERAL) 20 MG tablet TAKE 1 TABLET BY MOUTH DAILY 90 tablet 3    allopurinol (ZYLOPRIM) 100 MG tablet Take 100 mg by mouth daily      ibrutinib (IMBRUVICA) 420 MG tablet Take 420 mg by mouth daily      Calcium Carb-Cholecalciferol (CALCIUM/VITAMIN D PO) Take by mouth 1200 mg qd      multivitamin (ANTIOXIDANT;PROSIGHT) TABS per tablet Take 1 tablet by mouth daily.            Current Facility-Administered Medications:     enoxaparin (LOVENOX) injection 100 mg, 1 mg/kg, SubCUTAneous, BID, Kassandra Jacinto MD, 100 mg at 11/13/22 0944    cefepime (MAXIPIME) 2000 mg IVPB minibag, 2,000 mg, IntraVENous, Q12H, Lexi Lara MD, Stopped at 11/13/22 0939    dexamethasone (DECADRON) 20 mg in sodium chloride 0.9 % 50 mL IVPB, 20 mg, IntraVENous, Q24H, Abbey Villeda MD, Stopped at 11/12/22 1243    albuterol sulfate HFA (PROVENTIL;VENTOLIN;PROAIR) 108 (90 Base) MCG/ACT inhaler 2 puff, 2 puff, Inhalation, BID, Fiorella Membreno MD, 2 puff at 11/13/22 0911    ipratropium (ATROVENT HFA) 17 MCG/ACT inhaler 2 puff, 2 puff, Inhalation, BID, Fiorella Membreno MD, 2 puff at 11/13/22 0912    allopurinol (ZYLOPRIM) tablet 100 mg, 100 mg, Oral, Daily, Devendra Jensen MD, 100 mg at 11/13/22 0944    donepezil (ARICEPT) tablet 10 mg, 10 mg, Oral, Nightly, Devendra Jensen MD, 10 mg at 11/12/22 2029    ibrutinib (IMBRUVICA) chemo tablet 420 mg PATIENT SUPPLIED (Patient Supplied), 420 mg, Oral, Nightly, Devendra Jensen MD, 420 mg at 11/12/22 2108    levothyroxine (SYNTHROID) tablet 100 mcg, 100 mcg, Oral, Daily, Fiorella Membreno MD, 100 mcg at 11/13/22 0547    primidone (MYSOLINE) tablet 50 mg, 50 mg, Oral, Daily, Devendra Jensen MD, 50 mg at 11/13/22 0945    propranolol (INDERAL) tablet 20 mg, 20 mg, Oral, Daily, Fiorella Membreno MD, 20 mg at 11/13/22 0944    sodium chloride flush 0.9 % injection 5-40 mL, 5-40 mL, IntraVENous, 2 times per day, Fiorella Membreno MD, 10 mL at 11/13/22 0945    sodium chloride flush 0.9 % injection 5-40 mL, 5-40 mL, IntraVENous, PRN, Devendra Jensen MD    0.9 % sodium chloride infusion, , IntraVENous, PRN, Fiorella Membreno MD, Last Rate: 5 mL/hr at 11/13/22 0400, Rate Verify at 11/13/22 0400    ondansetron (ZOFRAN-ODT) disintegrating tablet 4 mg, 4 mg, Oral, Q8H PRN **OR** ondansetron (ZOFRAN) injection 4 mg, 4 mg, IntraVENous, Q6H PRN, Devendra Jensen MD    polyethylene glycol (GLYCOLAX) packet 17 g, 17 g, Oral, Daily PRN, Devendra Jensen MD    acetaminophen (TYLENOL) tablet 650 mg, 650 mg, Oral, Q6H PRN, 650 mg at 11/12/22 0948 **OR** acetaminophen (TYLENOL) suppository 650 mg, 650 mg, Rectal, Q6H PRN, Fiorella Membreno MD         =======================================================================================     PHYSICAL EXAM:  Recent vital signs and recent I/Os reviewed by me.      Wt Readings from Last 3 Encounters:   11/13/22 220 lb 0.3 oz (99.8 kg)   09/02/22 234 lb 6.4 oz (106.3 kg)   06/02/22 240 lb (108.9 kg)     BP Readings from Last 3 Encounters:   11/13/22 129/70   09/02/22 124/70   06/30/22 124/63     Patient Vitals for the past 24 hrs:   BP Temp Temp src Pulse Resp SpO2 Weight   11/13/22 1000 129/70 -- -- 87 13 92 % --   11/13/22 0915 -- -- -- 81 13 94 % --   11/13/22 0914 -- -- -- 81 18 94 % --   11/13/22 0900 126/66 -- -- 85 18 92 % --   11/13/22 0800 119/67 97.7 °F (36.5 °C) Temporal 80 30 94 % --   11/13/22 0700 (!) 146/67 97.7 °F (36.5 °C) Temporal 85 23 (!) 88 % --   11/13/22 0600 137/63 -- -- 92 15 93 % --   11/13/22 0510 -- -- -- -- -- -- 220 lb 0.3 oz (99.8 kg)   11/13/22 0500 127/70 -- -- 83 15 94 % --   11/13/22 0426 -- -- -- 64 21 94 % --   11/13/22 0400 129/64 98.5 °F (36.9 °C) Temporal 74 18 95 % --   11/13/22 0300 134/66 -- -- 67 17 96 % --   11/13/22 0200 118/79 -- -- 60 23 96 % --   11/13/22 0100 129/61 -- -- 65 17 96 % --   11/13/22 0000 118/62 96.8 °F (36 °C) Temporal 67 22 96 % --   11/12/22 2345 -- -- -- 66 23 95 % --   11/12/22 2206 -- -- -- 70 23 97 % --   11/12/22 2100 127/67 -- -- 76 21 94 % --   11/12/22 2000 (!) 118/59 97.9 °F (36.6 °C) Temporal 82 19 96 % --   11/12/22 1900 117/61 -- -- 77 20 95 % --   11/12/22 1800 127/66 -- -- 81 20 96 % --   11/12/22 1700 134/65 -- -- 79 22 94 % --   11/12/22 1600 120/61 98 °F (36.7 °C) Temporal 75 20 97 % --   11/12/22 1500 114/62 -- -- 73 18 97 % --   11/12/22 1400 (!) 122/91 -- -- 78 16 99 % --   11/12/22 1300 115/61 -- -- 79 17 95 % --   11/12/22 1200 109/60 97.7 °F (36.5 °C) Temporal 80 17 96 % --         Intake/Output Summary (Last 24 hours) at 11/13/2022 1056  Last data filed at 11/13/2022 0606  Gross per 24 hour   Intake 797.97 ml   Output --   Net 797.97 ml           Physical Exam  Vitals reviewed. Constitutional:       General: He is   in acute distress. Appearance: Normal appearance. He is ill-appearing. HENT:      Head: Normocephalic and atraumatic.       Right Ear: External ear normal. Left Ear: External ear normal.      Nose: Nose normal.      Mouth/Throat:      Mouth: Mucous membranes are moist. Mucous membranes are not dry. Eyes:      General: No scleral icterus. Conjunctiva/sclera: Conjunctivae normal.   Neck:      Vascular: No JVD. Cardiovascular:      Rate and Rhythm: Normal rate and regular rhythm. Heart sounds: S1 normal and S2 normal.   Pulmonary:      Effort: Pulmonary effort is ab-normal. . Needing NIPPV     Breath sounds: Rhonchi present. Abdominal:      General: Bowel sounds are normal. There is   distension. Musculoskeletal:         General: Swelling (mild) present. No deformity. Cervical back: Normal range of motion and neck supple. Skin:     General: Skin is dry. Coloration: Skin is not jaundiced. Neurological:      Mental Status: Drowsy, arouses able,  Psychiatric:      Unable to fully obtain    =======================================================================================     DATA:  Diagnostic tests reviewed by me for today's visit:   (AS NEEDED FOR MY EVALUATION AND MANAGEMENT). Recent Labs     11/11/22 0527 11/12/22 0623 11/13/22 0413   WBC 15.6* 14.7* 13.2*   HCT 38.6* 34.9* 35.1*    206 180       Iron Saturation:  No components found for: PERCENTFE  FERRITIN:  No results found for: FERRITIN  IRON:  No results found for: IRON  TIBC:  No results found for: TIBC    Recent Labs     11/11/22 0527 11/12/22 0623 11/13/22 0413    141 140   K 4.8 4.7 5.0   * 110 111*   CO2 18* 22 22   BUN 33* 37* 41*   CREATININE 1.6* 1.3 1.4*       Recent Labs     11/11/22 0527 11/12/22 0623 11/13/22 0413   CALCIUM 9.3 9.3 9.3   MG  --   --  2.80*   PHOS 1.3* 2.7 2.9       No results for input(s): PH, PCO2, PO2 in the last 72 hours.     Invalid input(s): Misael Monet    ABG:  No results found for: PH, PCO2, PO2, HCO3, BE, THGB, TCO2, O2SAT  VBG:    Lab Results   Component Value Date/Time    PHVEN 7.396 11/10/2022 11:37 AM    EVW6YJE 24.7 11/10/2022 11:37 AM    BEVEN -8.1 11/10/2022 11:37 AM    O6CDHQYJ 100 11/10/2022 11:37 AM       LDH:  No results found for: LDH  Uric Acid:    Lab Results   Component Value Date/Time    LABURIC 7.9 11/10/2022 05:59 AM       PT/INR:    Lab Results   Component Value Date/Time    PROTIME 12.6 06/25/2010 06:30 AM    INR 1.19 06/25/2010 06:30 AM     Warfarin PT/INR:  No components found for: PTPATWAR, PTINRWAR  PTT:    Lab Results   Component Value Date/Time    APTT 34.6 06/25/2010 06:30 AM   [APTT}  Last 3 Troponin:    Lab Results   Component Value Date/Time    TROPONINI 0.01 11/08/2022 02:57 PM       U/A:    Lab Results   Component Value Date/Time    NITRITE neg 02/01/2018 03:01 PM    COLORU Yellow 11/09/2022 02:30 PM    PROTEINU 100 11/09/2022 02:30 PM    PHUR 6.0 11/09/2022 02:30 PM    WBCUA 1 11/09/2022 02:30 PM    RBCUA 0-2 11/09/2022 02:30 PM    BACTERIA 4+ 11/09/2022 02:30 PM    CLARITYU CLOUDY 11/09/2022 02:30 PM    SPECGRAV 1.020 11/09/2022 02:30 PM    LEUKOCYTESUR SMALL 11/09/2022 02:30 PM    UROBILINOGEN 1.0 11/09/2022 02:30 PM    BILIRUBINUR Negative 11/09/2022 02:30 PM    BILIRUBINUR neg 02/01/2018 03:01 PM    BLOODU SMALL 11/09/2022 02:30 PM    GLUCOSEU Negative 11/09/2022 02:30 PM    AMORPHOUS Present 11/09/2022 02:30 PM     Microalbumen/Creatinine ratio:  No components found for: RUCREAT  24 Hour Urine for Protein:  No components found for: RAWUPRO, UHRS3, MOIA06PN, UTV3  24 Hour Urine for Creatinine Clearance:  No components found for: CREAT4, UHRS10, UTV10  Urine Toxicology:  No components found for: IAMMENTA, IBARBIT, IBENZO, ICOCAINE, IMARTHC, IOPIATES, IPHENCYC    HgBA1c:  No results found for: LABA1C  RPR:  No results found for: RPR  HIV:  No results found for: HIV  [place]:  No results found for: ANATITER, [place]  RF:  No results found for: RF  DSDNA:  No components found for: DNA  AMYLASE:  No results found for: AMYLASE  LIPASE:  No results found for: LIPASE  Fibrinogen Level: No components found for: FIB       BELOW MENTIONED RADIOLOGY STUDY RESULTS BY ME (AS NEEDED FOR MY EVALUATION AND MANAGEMENT). CT CHEST WO CONTRAST    Result Date: 11/8/2022  Consolidation in the mid to lower lungs is compatible with multifocal pneumonia. Suspected right hilar lymphadenopathy is likely reactive. Suggest follow-up contrast-enhanced chest CT within 3 months to assess for resolution. This report was transcribed using voice recognition software, mainly. So please excuse brevity and/or typos. Every effort was made to ensure accuracy, however, inadvertent computerized transcription errors may be present. Please contact us, if any questions or clarifications are needed.

## 2022-11-13 NOTE — PLAN OF CARE
Problem: Discharge Planning  Goal: Discharge to home or other facility with appropriate resources  11/12/2022 2224 by Mckenna Dixon RN  Outcome: Progressing  11/12/2022 1132 by Sy Garay RN  Outcome: Progressing  Flowsheets (Taken 11/12/2022 0800)  Discharge to home or other facility with appropriate resources: Refer to discharge planning if patient needs post-hospital services based on physician order or complex needs related to functional status, cognitive ability or social support system     Problem: Pain  Goal: Verbalizes/displays adequate comfort level or baseline comfort level  11/12/2022 2224 by Mckenna Dixon RN  Outcome: Progressing  11/12/2022 1132 by Sy Garay RN  Outcome: Progressing     Problem: Skin/Tissue Integrity  Goal: Absence of new skin breakdown  Description: 1. Monitor for areas of redness and/or skin breakdown  2. Assess vascular access sites hourly  3. Every 4-6 hours minimum:  Change oxygen saturation probe site  4. Every 4-6 hours:  If on nasal continuous positive airway pressure, respiratory therapy assess nares and determine need for appliance change or resting period.   11/12/2022 2224 by Mckenna Dixon RN  Outcome: Progressing  11/12/2022 1132 by Sy Garay RN  Outcome: Progressing     Problem: ABCDS Injury Assessment  Goal: Absence of physical injury  11/12/2022 2224 by Mckenna Dixon RN  Outcome: Progressing  11/12/2022 1132 by Sy Garay RN  Outcome: Progressing

## 2022-11-13 NOTE — PROGRESS NOTES
[facility] PROGRESS NOTE    11/13/2022 1:21 PM        Name: Jim Patel . Admitted: 11/8/2022  Primary Care Provider: FAIZAN Lui CNP (Tel: [phone])                        Subjective:   Patient seen and examined. Remains on Airvo. In Atrial flutter with variable block. Dyspnea and productive cough+.       Reviewed interval ancillary notes    Current Medications  enoxaparin (LOVENOX) injection 100 mg, BID  cefepime (MAXIPIME) 2000 mg IVPB minibag, Q12H  dexamethasone (DECADRON) 20 mg in sodium chloride 0.9 % 50 mL IVPB, Q24H  albuterol sulfate HFA (PROVENTIL;VENTOLIN;PROAIR) 108 (90 Base) MCG/ACT inhaler 2 puff, BID  ipratropium (ATROVENT HFA) 17 MCG/ACT inhaler 2 puff, BID  allopurinol (ZYLOPRIM) tablet 100 mg, Daily  donepezil (ARICEPT) tablet 10 mg, Nightly  ibrutinib (IMBRUVICA) chemo tablet 420 mg PATIENT SUPPLIED (Patient Supplied), Nightly  levothyroxine (SYNTHROID) tablet 100 mcg, Daily  primidone (MYSOLINE) tablet 50 mg, Daily  propranolol (INDERAL) tablet 20 mg, Daily  sodium chloride flush 0.9 % injection 5-40 mL, 2 times per day  sodium chloride flush 0.9 % injection 5-40 mL, PRN  0.9 % sodium chloride infusion, PRN  ondansetron (ZOFRAN-ODT) disintegrating tablet 4 mg, Q8H PRN   Or  ondansetron (ZOFRAN) injection 4 mg, Q6H PRN  polyethylene glycol (GLYCOLAX) packet 17 g, Daily PRN  acetaminophen (TYLENOL) tablet 650 mg, Q6H PRN   Or  acetaminophen (TYLENOL) suppository 650 mg, Q6H PRN      Objective:  /70   Pulse 87   Temp 97.7 °F (36.5 °C) (Temporal)   Resp 13   Ht 6' 2\" (1.88 m)   Wt 220 lb 0.3 oz (99.8 kg)   SpO2 92%   BMI 28.25 kg/m²     Intake/Output Summary (Last 24 hours) at 11/13/2022 1321  Last data filed at 11/13/2022 0606  Gross per 24 hour   Intake 797.97 ml   Output --   Net 797.97 ml      Wt Readings from Last 3 Encounters:   11/13/22 220 lb 0.3 oz (99.8 kg)   09/02/22 234 lb 6.4 oz (106.3 kg)   06/02/22 240 lb (108.9 kg)       General appearance:  elderly, frail, ill looking. Eyes: Sclera clear. Pupils equal.  ENT: Moist oral mucosa. Trachea midline, no adenopathy. Cardiovascular: Regular rhythm, normal S1, S2. No murmur. No edema in lower extremities  Respiratory: Not using accessory muscles. Good inspiratory effort. Clear to auscultation bilaterally, no wheeze or crackles. GI: Abdomen soft, no tenderness, not distended, normal bowel sounds  Musculoskeletal: No cyanosis in digits, neck supple  Neurology: CN 2-12 grossly intact. No speech or motor deficits  Psych: Normal affect. Alert and oriented in time, place and person  Skin: Warm, dry, normal turgor    Labs and Tests:  CBC:   Recent Labs     11/11/22  0527 11/12/22  0623 11/13/22  0413   WBC 15.6* 14.7* 13.2*   HGB 12.6* 11.4* 11.4*    206 180     BMP:    Recent Labs     11/11/22  0527 11/12/22  0623 11/13/22  0413    141 140   K 4.8 4.7 5.0   * 110 111*   CO2 18* 22 22   BUN 33* 37* 41*   CREATININE 1.6* 1.3 1.4*   GLUCOSE 113* 162* 145*     Hepatic:   Recent Labs     11/13/22  0413   AST 43*   ALT 60*   BILITOT 0.4   ALKPHOS 45         Discussed care with patient             Problem List  Principal Problem:    Septicemia ([facility]  Active Problems:    Acute respiratory failure with hypoxia (HCC)    Atrial flutter by electrocardiogram (HCC)    History of chronic lymphocytic leukemia    Pneumonia due to COVID-19 virus    Community acquired pneumonia  Resolved Problems:    * No resolved hospital problems. *       Assessment & Plan:     Acute hypoxic respiratory failure due to COVID 19 pneumonia:  Remains on Airvo. Procalcitonin and inflammatory markers elevated. Continue cefepime and Decadron. Pulm consult appreciated. ROSIE on CKD 3: resolving  Nephrology consulted: Appreciate input. Baseline serum creatinine 1.4-1.5. Peak Scr 1.8. CLL:  Oncology following: Diagnosed in February 2021  Continue Ibrutinib for 20 mg p.o. daily. White count at baseline - 15-17K      Atrial Flutter: rate controlled. Cardiology consult appreciated. Started on Therapeutic Lovenox. EP evaluation after recovery from Pneumonia. Palliative care consulted given poor prognosis. Diet: ADULT DIET;  Regular  ADULT ORAL NUTRITION SUPPLEMENT; Lunch, Dinner; Standard High Calorie/High Protein Oral Supplement  Code:Full Code  DVT PPX lovenox       Jose Luis Fine MD   11/13/2022 1:21 PM

## 2022-11-13 NOTE — PROGRESS NOTES
[address] [place] [address] PROGRESS NOTE    11/12/2022 8:47 PM        Name: Christine Alvares Admitted: 11/8/2022  Primary Care Provider: FAIZAN Gant CNP (Tel: [phone])                        Subjective:   Patient seen and examined. Remains on Airvo 50% 80 L/min with 95% saturation. In Atrial flutter with variable block. Dyspnea and productive cough+.       Reviewed interval ancillary notes    Current Medications  enoxaparin (LOVENOX) injection 100 mg, BID  dexamethasone (DECADRON) 20 mg in sodium chloride 0.9 % 50 mL IVPB, Q24H  albuterol sulfate HFA (PROVENTIL;VENTOLIN;PROAIR) 108 (90 Base) MCG/ACT inhaler 2 puff, BID  ipratropium (ATROVENT HFA) 17 MCG/ACT inhaler 2 puff, BID  allopurinol (ZYLOPRIM) tablet 100 mg, Daily  donepezil (ARICEPT) tablet 10 mg, Nightly  ibrutinib (IMBRUVICA) chemo tablet 420 mg PATIENT SUPPLIED (Patient Supplied), Nightly  levothyroxine (SYNTHROID) tablet 100 mcg, Daily  primidone (MYSOLINE) tablet 50 mg, Daily  propranolol (INDERAL) tablet 20 mg, Daily  cefepime (MAXIPIME) 2000 mg IVPB minibag, Q12H  sodium chloride flush 0.9 % injection 5-40 mL, 2 times per day  sodium chloride flush 0.9 % injection 5-40 mL, PRN  0.9 % sodium chloride infusion, PRN  ondansetron (ZOFRAN-ODT) disintegrating tablet 4 mg, Q8H PRN   Or  ondansetron (ZOFRAN) injection 4 mg, Q6H PRN  polyethylene glycol (GLYCOLAX) packet 17 g, Daily PRN  acetaminophen (TYLENOL) tablet 650 mg, Q6H PRN   Or  acetaminophen (TYLENOL) suppository 650 mg, Q6H PRN      Objective:  /61   Pulse 77   Temp 97.9 °F (36.6 °C) (Temporal)   Resp 20   Ht 6' 2\" (1.88 m)   Wt 221 lb 1.9 oz (100.3 kg)   SpO2 95%   BMI 28.39 kg/m²     Intake/Output Summary (Last 24 hours) at 11/12/2022 2047  Last data filed at 11/12/2022 0454  Gross per 24 hour   Intake 2282.03 ml   Output --   Net 2282.03 ml      Wt Readings from Last 3 Encounters:   11/12/22 221 lb 1.9 oz (100.3 kg)   09/02/22 234 lb 6.4 oz (106.3 kg)   06/02/22 240 lb (108.9 kg)       General appearance:  elderly, frail, ill looking. Eyes: Sclera clear. Pupils equal.  ENT: Moist oral mucosa. Trachea midline, no adenopathy. Cardiovascular: Regular rhythm, normal S1, S2. No murmur. No edema in lower extremities  Respiratory: Not using accessory muscles. Good inspiratory effort. Clear to auscultation bilaterally, no wheeze or crackles. GI: Abdomen soft, no tenderness, not distended, normal bowel sounds  Musculoskeletal: No cyanosis in digits, neck supple  Neurology: CN 2-12 grossly intact. No speech or motor deficits  Psych: Normal affect. Alert and oriented in time, place and person  Skin: Warm, dry, normal turgor    Labs and Tests:  CBC:   Recent Labs     11/11/22  0527 11/12/22  0623   WBC 15.6* 14.7*   HGB 12.6* 11.4*    206     BMP:    Recent Labs     11/10/22  0559 11/11/22  0527 11/12/22  0623    142 141   K 4.2 4.8 4.7    111* 110   CO2 16* 18* 22   BUN 28* 33* 37*   CREATININE 1.4* 1.6* 1.3   GLUCOSE 113* 113* 162*     Hepatic:   No results for input(s): AST, ALT, ALB, BILITOT, ALKPHOS in the last 72 hours. Discussed care with patient             Problem List  Principal Problem:    Septicemia [facility])  Active Problems:    Acute respiratory failure with hypoxia ([facility]    Atrial flutter by electrocardiogram ([facility]    History of chronic lymphocytic leukemia    Pneumonia due to COVID-19 virus    Community acquired pneumonia  Resolved Problems:    * No resolved hospital problems. *       Assessment & Plan:     Acute hypoxic respiratory failure due to COVID 19 pneumonia:  Still with high oxygen requirements. On careful 80 L/min flow and 50%. Procalcitonin and inflammatory markers elevated. Continue cefepime and Decadron. Pulm consult appreciated.       ROSIE on CKD 3: resolving  Nephrology consulted: Tre Torres input. Baseline serum creatinine 1.4-1.5. Peak Scr 1.8. CLL:  Oncology following: Diagnosed in February 2021  Continue Ibrutinib for 20 mg p.o. daily. White count at baseline - 15-17K      Atrial Flutter: rate controlled. Cardiology consult appreciated. Started on Therapeutic Lovenox. EP evaluation after recovery from Pneumonia. Palliative care consulted given poor prognosis. Diet: ADULT DIET;  Regular  ADULT ORAL NUTRITION SUPPLEMENT; Lunch, Dinner; Standard High Calorie/High Protein Oral Supplement  Code:Full Code  DVT PPX lovenox       Rachael Presley MD   11/12/2022 8:47 PM

## 2022-11-13 NOTE — PLAN OF CARE
Problem: Discharge Planning  Goal: Discharge to home or other facility with appropriate resources  Outcome: Progressing     Problem: Pain  Goal: Verbalizes/displays adequate comfort level or baseline comfort level  Outcome: Progressing     Problem: Skin/Tissue Integrity  Goal: Absence of new skin breakdown  Description: 1. Monitor for areas of redness and/or skin breakdown  2. Assess vascular access sites hourly  3. Every 4-6 hours minimum:  Change oxygen saturation probe site  4. Every 4-6 hours:  If on nasal continuous positive airway pressure, respiratory therapy assess nares and determine need for appliance change or resting period.   Outcome: Progressing     Problem: ABCDS Injury Assessment  Goal: Absence of physical injury  Outcome: Progressing     Problem: Safety - Adult  Goal: Free from fall injury  Outcome: Progressing     Problem: Respiratory - Adult  Goal: Achieves optimal ventilation and oxygenation  Outcome: Progressing     Problem: Cardiovascular - Adult  Goal: Maintains optimal cardiac output and hemodynamic stability  Outcome: Progressing     Problem: Skin/Tissue Integrity - Adult  Goal: Skin integrity remains intact  Outcome: Progressing     Problem: Musculoskeletal - Adult  Goal: Return mobility to safest level of function  Outcome: Progressing     Problem: Gastrointestinal - Adult  Goal: Maintains or returns to baseline bowel function  Outcome: Progressing     Problem: Infection - Adult  Goal: Absence of infection at discharge  Outcome: Progressing     Problem: Nutrition Deficit:  Goal: Optimize nutritional status  Outcome: Progressing

## 2022-11-13 NOTE — PROGRESS NOTES
[facility]   Progress Note  CHF/Pulmonary Hypertension Cardiology    Chief complaint: We are following this patient for atrial flutter, new onset  HPI:  Chencho Daley is an 81 yo male with a PMH of CLL, hypertension, prostate cancer who presented 11/8/22 with cough, fever, and SOB. He was diagnosed with COVID pneumonia. Since admission, his oxygen requirement were slowly deteriorating. Yesterday, his oxygen sat was 84% on 8 liters. He was increased to 14 liters. He was started on Decadron for COVID pneumonia. He was also noted to have ROSIE and was on bicarbonate drip. He was moved to the ICU yesterday due to his oxygen requirements. He remains on airVO at 80%. He is weak and deconditioned. He continues to have intermittent cough. His inflammation markers are elevated. He was noted on his admission EKG to be in atrial flutter with controlled rate. His last EKG in the system showed sinus rhythm in 2018. He has remained in atrial flutter since admission. We are consulted for his atrial flutter. His bnp on admission was 607. His atrial flutter rate is controlled. He continues on high doses of oxygen. Labs:  Sodium 141  K 4.7  BUN/cre 37/1.3  Bnp 607  Troponin 0.01  H/H 11.4/34.9     EKG:  atrial flutter with controlled rate     Meds prior to admission:  Amlodipine 10 qd  Torsemide 20 qd  Kcl 10 qd  Propranolol 20 qd    ROS:  patient continues on heated high flow oxygen 50 liters. Blood pressure stable. Remains in atrial flutter with controlled rate.     Medications/Labs all Reviewed    Lab Results   Component Value Date    WBC 13.2 (H) 11/13/2022    HGB 11.4 (L) 11/13/2022    HCT 35.1 (L) 11/13/2022    MCV 83.1 11/13/2022     11/13/2022     Lab Results   Component Value Date    CREATININE 1.4 (H) 11/13/2022    BUN 41 (H) 11/13/2022     11/13/2022    K 5.0 11/13/2022     (H) 11/13/2022    CO2 22 11/13/2022     Lab Results   Component Value Date INR 1.19 (H) 06/25/2010    PROTIME 12.6 (H) 06/25/2010        Physical Examination:    /67   Pulse 81   Temp 97.7 °F (36.5 °C) (Temporal)   Resp 13   Ht 6' 2\" (1.88 m)   Wt 220 lb 0.3 oz (99.8 kg)   SpO2 94%   BMI 28.25 kg/m²      WD/WN  HEENT:  NC/AT  Respiratory:  Resp Assessment: Normal respiratory effort  Resp Auscultation: Clear to auscultation bilaterally   Cardiovascular:   Auscultation: irregularly irregular rate and rhythm, normal S1S2, no murmur, rub or gallop  Palpation:  Nl PMI  JVP:  normal  Extremities: No Edema  Abdomen:  Soft, non-tender  Normal bowel sounds  Extremities:   No Cyanosis or Clubbing  Neurological/Psychiatric:  Oriented to time, place, and person  Non-anxious  Skin Warm and dry    Lab Results   Component Value Date/Time     11/13/2022 04:13 AM     11/12/2022 06:23 AM     11/11/2022 05:27 AM    K 5.0 11/13/2022 04:13 AM    K 4.7 11/12/2022 06:23 AM    K 4.8 11/11/2022 05:27 AM    K 4.8 11/08/2022 02:57 PM    BUN 41 11/13/2022 04:13 AM    BUN 37 11/12/2022 06:23 AM    BUN 33 11/11/2022 05:27 AM    CREATININE 1.4 11/13/2022 04:13 AM    CREATININE 1.3 11/12/2022 06:23 AM    CREATININE 1.6 11/11/2022 05:27 AM    GLUCOSE 145 11/13/2022 04:13 AM    GLUCOSE 162 11/12/2022 06:23 AM     Lab Results   Component Value Date    PROBNP 607 (H) 11/08/2022     Lab Results   Component Value Date    ALT 60 (H) 11/13/2022    ALT 33 11/08/2022    AST 43 (H) 11/13/2022     (H) 11/08/2022     Lab Results   Component Value Date/Time    HGB 11.4 11/13/2022 04:13 AM    HGB 11.4 11/12/2022 06:23 AM    HCT 35.1 11/13/2022 04:13 AM    HCT 34.9 11/12/2022 06:23 AM     11/13/2022 04:13 AM     11/12/2022 06:23 AM     Lab Results   Component Value Date/Time    TRIG 110 10/23/2020 12:03 PM    TRIG 84 09/11/2019 11:41 AM    HDL 41 10/23/2020 12:03 PM    HDL 36 09/11/2019 11:41 AM    HDL 34 04/06/2011 09:30 AM    LDLCALC 67 10/23/2020 12:03 PM    LDLCALC 55 09/11/2019 11:41 AM     Assessment:    1. Septicemia ([facility] [address]    2. Community acquired pneumonia, unspecified laterality    3. Acute respiratory failure with hypoxia (HCC)    4. Abnormal CXR    5. History of chronic lymphocytic leukemia    6. Atrial flutter by electrocardiogram [facility])          Plan:   His atrial flutter rate is well controlled. If anything, his heart rate is slow, considering the degree of hypoxemia that he has  Consider stopping propranolol to raise his HR  Continue lovenox 1mg/kg bid due to aflutter (consider NOAC on discharge)  Continue aggressive treatment of COVID pneumonia per ICU team  After he recovers from pneumonia, can have EP re-evaluate for cardioversion or further treatment of atrial flutter. The patient was seen for > 25 minutes. I reviewed interval history, physical exam, review of data including labs, imaging, development and implementation of treatment plan and coordination of complex care.  More than 50% of the time was devoted to counseling the patient on their diagnoses/treatments, as well as coordination of care with the other care teams         NYHA Class: 4    María Allen MD, 11/13/2022 10:02 AM

## 2022-11-13 NOTE — PROGRESS NOTES
11/13/22 1239   RT Protocol   History Pulmonary Disease 0   Respiratory pattern 0   Breath sounds 2   Cough 2   Bronchodilator Assessment Score 4

## 2022-11-14 PROBLEM — I48.3 TYPICAL ATRIAL FLUTTER (HCC): Status: ACTIVE | Noted: 2022-01-01

## 2022-11-14 PROBLEM — J80 ARDS (ADULT RESPIRATORY DISTRESS SYNDROME) (HCC): Status: ACTIVE | Noted: 2022-01-01

## 2022-11-14 NOTE — PROGRESS NOTES
Pt assessed. VSS. Pt oriented to person and place. Respirations regular, labored. Diminished lung sounds. Abdomen round, distended. Active bowel sounds. Pt on heated high flow cannula, 68%, 50L. Call light within reach bed alarm engaged. Meds given per eMAR.

## 2022-11-14 NOTE — PROGRESS NOTES
[facility] PROGRESS NOTE    11/14/2022 2:53 PM        Name: Barbara Patel . Admitted: 11/8/2022  Primary Care Provider: FAIZAN Turcios CNP (Tel: [phone])                        Subjective:   Patient seen and examined with daughter at bedside. Remains on Airvo. In Atrial flutter with variable block. Dyspnea and productive cough+. CODE status now limited. Family still wants to give him some time to recover but no intubation or CPR. Trial of BiPAP if needed.       Reviewed interval ancillary notes    Current Medications  furosemide (LASIX) injection 20 mg, Daily  enoxaparin (LOVENOX) injection 100 mg, BID  cefepime (MAXIPIME) 2000 mg IVPB minibag, Q12H  dexamethasone (DECADRON) 20 mg in sodium chloride 0.9 % 50 mL IVPB, Q24H  albuterol sulfate HFA (PROVENTIL;VENTOLIN;PROAIR) 108 (90 Base) MCG/ACT inhaler 2 puff, BID  ipratropium (ATROVENT HFA) 17 MCG/ACT inhaler 2 puff, BID  allopurinol (ZYLOPRIM) tablet 100 mg, Daily  donepezil (ARICEPT) tablet 10 mg, Nightly  ibrutinib (IMBRUVICA) chemo tablet 420 mg PATIENT SUPPLIED (Patient Supplied), Nightly  levothyroxine (SYNTHROID) tablet 100 mcg, Daily  primidone (MYSOLINE) tablet 50 mg, Daily  propranolol (INDERAL) tablet 20 mg, Daily  sodium chloride flush 0.9 % injection 5-40 mL, 2 times per day  sodium chloride flush 0.9 % injection 5-40 mL, PRN  0.9 % sodium chloride infusion, PRN  ondansetron (ZOFRAN-ODT) disintegrating tablet 4 mg, Q8H PRN   Or  ondansetron (ZOFRAN) injection 4 mg, Q6H PRN  polyethylene glycol (GLYCOLAX) packet 17 g, Daily PRN  acetaminophen (TYLENOL) tablet 650 mg, Q6H PRN   Or  acetaminophen (TYLENOL) suppository 650 mg, Q6H PRN      Objective:  BP (!) 147/70   Pulse 78   Temp 99 °F (37.2 °C) (Temporal)   Resp 20   Ht 6' 2\" (1.88 m)   Wt 220 lb 7.4 oz (100 kg)   SpO2 90%   BMI 28.31 kg/m²     Intake/Output Summary (Last 24 hours) at 11/14/2022 1453  Last data filed at 11/14/2022 1447  Gross per 24 hour   Intake 130 ml   Output --   Net 130 ml      Wt Readings from Last 3 Encounters:   11/14/22 220 lb 7.4 oz (100 kg)   09/02/22 234 lb 6.4 oz (106.3 kg)   06/02/22 240 lb (108.9 kg)       General appearance:  elderly, frail, lethargic, ill looking. Eyes: Sclera clear. Pupils equal.  ENT: Moist oral mucosa. Trachea midline, no adenopathy. Cardiovascular: Regular rhythm, normal S1, S2. No murmur. No edema in lower extremities  Respiratory: Not using accessory muscles. Good inspiratory effort. Clear to auscultation anteriorly. GI: Abdomen soft, no tenderness, not distended, normal bowel sounds  Musculoskeletal: No cyanosis in digits, neck supple  Neurology: CN 2-12 grossly intact. No speech or motor deficits  Psych: Normal affect.  Alert and oriented in time, place and person  Skin: Warm, dry, normal turgor    Labs and Tests:  CBC:   Recent Labs     11/12/22 0623 11/13/22 0413 11/14/22  0356   WBC 14.7* 13.2* 16.1*   HGB 11.4* 11.4* 11.5*    180 197     BMP:    Recent Labs     11/12/22 0623 11/13/22 0413 11/14/22  0356    140 136   K 4.7 5.0 5.2*    111* 107   CO2 22 22 19*   BUN 37* 41* 43*   CREATININE 1.3 1.4* 1.2   GLUCOSE 162* 145* 120*     Hepatic:   Recent Labs     11/13/22 0413 11/14/22  0356   AST 43* 41*   ALT 60* 61*   BILITOT 0.4 0.4   ALKPHOS 45 45         Discussed care with patient             Problem List  Principal Problem:    Septicemia ([facility]  Active Problems:    Acute respiratory failure with hypoxia (HCC)    Atrial flutter by electrocardiogram (HCC)    History of chronic lymphocytic leukemia    Pneumonia due to COVID-19 virus    Community acquired pneumonia    ROSIE (acute kidney injury) ([facility] [address]    CLL (chronic lymphocytic leukemia) ([facility]    Sepsis with acute hypoxic respiratory failure without septic shock (HCC)    Typical atrial flutter ([address] Primary hypertension  Resolved Problems:    * No resolved hospital problems. *       Assessment & Plan:     Acute hypoxic respiratory failure due to COVID 19 pneumonia:  Remains on Airvo. Procalcitonin and inflammatory markers elevated. Continue cefepime and Decadron. Pulm consult appreciated. ROSIE on CKD 3: resolved  Nephrology consulted: Appreciate input. Baseline serum creatinine 1.4-1.5. Peak Scr 1.8. Mild hyperkalemia. Monitor BMP. CLL:  Oncology following: Diagnosed in February 2021  Continue Ibrutinib for 20 mg p.o. daily. White count at baseline - 15-17K      Atrial Flutter: rate controlled. Cardiology consult appreciated. Started on Therapeutic Lovenox. EP evaluation after recovery from Pneumonia. Palliative care consulted given poor prognosis. Currently with limited code. Diet: ADULT DIET;  Regular  ADULT ORAL NUTRITION SUPPLEMENT; Lunch, Dinner; Standard High Calorie/High Protein Oral Supplement  Code:Limited  DVT PPX lovenox       Cara Cuenca MD   11/14/2022 2:53 PM

## 2022-11-14 NOTE — PROGRESS NOTES
MD Johnny Nguyne MD Cleo Monas, MD                  Office: [phone]                      Fax: [phone]             [address] [place] [address]                   NEPHROLOGY INPATIENT PROGRESS NOTE:     PATIENT NAME: Yannick Gabriel  : 1934  MRN: [id number]      RECOMMENDATIONS:   - pulmonary fup for worsening hypoxia, COVID-19 mx   - multifocal pneumonia pic on CT, no major fluid overload     - try low dose lasix  - fup hyperkalemia with with     -Stopped bicarb infusion  - COVID-19 mx as per pulmonary team  - trend serum uric acid  -f/up w/ hem-onc   - monitor CBC w/ h/o CLL-diagnosed in 2021, was on ibrutinib  - IV antibiotic: Vancomycin: trough goal <15, pharmacy team assisting.   - monitor PVR w/ bladder scan for willis insertion need. - at higher risk for decompensation, needing closer monitoring.     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):   - BL Scr- lowest recently 1.4-1.5, as off 22   ->  1.8 on admission  - Etiology of ROSIE - presumed pre-renal now    - other differentials: unlikely  GN / TI / TMA process  - UA : needs it  - Renal imaging: on : 3-2021 w IV dye CT - Tiny nonobstructing right renal stone      Associated problems:   - Volume status: mild hypo-volemic  : HTN : no need for tight control    : Na: hyponatremia - mild 130 on admission, no recent h/o hypoNa, so likely acute, so should be ok for acute reversal     - Azotemia: pre-renal   - Electrolytes: K: WNL  - Acid-Base: acidosis, non-AGMA 15 on admission, LA WNL- checked blood gas - likely [email or web address]    - Anemia: of chronic disease        Other major problems: Management per primary and other consulting teams. H/O CLL     - CT chest -   Consolidation in the mid to lower lungs is compatible with multifocal pneumonia. Suspected right hilar lymphadenopathy is likely reactive. Suggest follow-up contrast-enhanced chest CT within 3 months to assess for resolution. -- would need fup w/ PCP      Hospital Problems             Last Modified POA    * (Principal) Septicemia ([address] 11/12/2022 Yes    Acute respiratory failure with hypoxia ([address] 11/12/2022 Yes    Atrial flutter by electrocardiogram ([address] 11/12/2022 Yes    History of chronic lymphocytic leukemia 11/12/2022 Yes    Pneumonia due to COVID-19 virus 11/12/2022 Yes    Community acquired pneumonia 11/12/2022 Yes    ROSIE (acute kidney injury) ([address] 11/14/2022 Yes    CLL (chronic lymphocytic leukemia) ([address] 11/13/2022 Yes    Sepsis with acute hypoxic respiratory failure without septic shock ([address] 11/13/2022 Yes    Typical atrial flutter ([address] 11/14/2022 Yes    Primary hypertension 11/14/2022 Yes   : other supportive care :   - Check daily renal function panel with electrolytes-phosphorus  - Strict monitoring of I/Os, daily weight  - Renal feeds/diet  - Current medications reviewed. - Nephrotoxic medications have been discontinued. - Dose adjusted and appropriate. - Dose meds for eGFR <15 mL/min/1.73m2 during ROSIE    - Avoid heavy opioids due to renal failure - may use very low dose dilaudid / fentanyl with close monitoring of CNS and respiratory depression. Please refer to the orders. High Complexity. Multiple complex problems. Discussed with patient and treatment team-    Time spent > 30 (~35) minutes. Thank you for allowing me to participate in this patient's care. Please do not hesitate to contact me anytime. We will follow along with you.        Jose Lynn, MD,  [facility] [address] [place]: [phone] or Via [employer]  Fax: [phone]        =======================================================================================   =======================================================================================  Subjective / interval history: worsening hypoxia, needing more O2   Overall still worsening hypoxia requirement increasing,  Renal function stable overall. K higher  Decreased urine output    Past medical, Surgical, Social, Family medical history reviewed by me. MEDICATIONS: reviewed by me. Medications Prior to Admission:  No current facility-administered medications on file prior to encounter. Current Outpatient Medications on File Prior to Encounter   Medication Sig Dispense Refill    amLODIPine (NORVASC) 10 MG tablet TAKE 1 TABLET BY MOUTH DAILY 90 tablet 3    torsemide (DEMADEX) 20 MG tablet TAKE 1 TABLET BY MOUTH DAILY 30 tablet 5    potassium chloride (KLOR-CON M) 10 MEQ extended release tablet TAKE 1 TABLET BY MOUTH DAILY 90 tablet 3    donepezil (ARICEPT) 10 MG tablet Take 1 tablet by mouth nightly 90 tablet 3    primidone (MYSOLINE) 50 MG tablet TAKE 1 TABLET BY MOUTH DAILY 90 tablet 3    levothyroxine (SYNTHROID) 100 MCG tablet Take 1 tablet by mouth Daily 90 tablet 3    propranolol (INDERAL) 20 MG tablet TAKE 1 TABLET BY MOUTH DAILY 90 tablet 3    allopurinol (ZYLOPRIM) 100 MG tablet Take 100 mg by mouth daily      ibrutinib (IMBRUVICA) 420 MG tablet Take 420 mg by mouth daily      Calcium Carb-Cholecalciferol (CALCIUM/VITAMIN D PO) Take by mouth 1200 mg qd      multivitamin (ANTIOXIDANT;PROSIGHT) TABS per tablet Take 1 tablet by mouth daily.            Current Facility-Administered Medications:     enoxaparin (LOVENOX) injection 100 mg, 1 mg/kg, SubCUTAneous, BID, Eric Jon MD, 100 mg at 11/14/22 1014    cefepime (MAXIPIME) 2000 mg IVPB minibag, 2,000 mg, IntraVENous, Q12H, Pamela Cole MD, Stopped at 11/14/22 0837    dexamethasone (DECADRON) 20 mg in sodium chloride 0.9 % 50 mL IVPB, 20 mg, IntraVENous, Q24H, Slime Thomas MD, Stopped at 11/14/22 1129    albuterol sulfate HFA (PROVENTIL;VENTOLIN;PROAIR) 108 (90 Base) MCG/ACT inhaler 2 puff, 2 puff, Inhalation, BID, Devendra Jensen MD, 2 puff at 11/14/22 0900    ipratropium (ATROVENT HFA) 17 MCG/ACT inhaler 2 puff, 2 puff, Inhalation, BID, Oumar Morrison MD, 2 puff at 11/14/22 0859    allopurinol (ZYLOPRIM) tablet 100 mg, 100 mg, Oral, Daily, Devendra Jensen MD, 100 mg at 11/14/22 1014    donepezil (ARICEPT) tablet 10 mg, 10 mg, Oral, Nightly, Devendra Jensen MD, 10 mg at 11/13/22 2056    ibrutinib (IMBRUVICA) chemo tablet 420 mg PATIENT SUPPLIED (Patient Supplied), 420 mg, Oral, Nightly, Devendra Jensen MD, 420 mg at 11/13/22 2057    levothyroxine (SYNTHROID) tablet 100 mcg, 100 mcg, Oral, Daily, Devendra Jensen MD, 100 mcg at 11/14/22 0432    primidone (MYSOLINE) tablet 50 mg, 50 mg, Oral, Daily, Devendra Jensen MD, 50 mg at 11/14/22 1014    propranolol (INDERAL) tablet 20 mg, 20 mg, Oral, Daily, Oumar Morrison MD, 20 mg at 11/14/22 1014    sodium chloride flush 0.9 % injection 5-40 mL, 5-40 mL, IntraVENous, 2 times per day, Oumar Morrison MD, 10 mL at 11/14/22 1014    sodium chloride flush 0.9 % injection 5-40 mL, 5-40 mL, IntraVENous, PRN, Devendra Jensen MD    0.9 % sodium chloride infusion, , IntraVENous, PRN, Oumar Morrison MD, Last Rate: 5 mL/hr at 11/13/22 0400, Rate Verify at 11/13/22 0400    ondansetron (ZOFRAN-ODT) disintegrating tablet 4 mg, 4 mg, Oral, Q8H PRN **OR** ondansetron (ZOFRAN) injection 4 mg, 4 mg, IntraVENous, Q6H PRN, Devendra Jensen MD    polyethylene glycol (GLYCOLAX) packet 17 g, 17 g, Oral, Daily PRN, Devendra Jensen MD    acetaminophen (TYLENOL) tablet 650 mg, 650 mg, Oral, Q6H PRN, 650 mg at 11/12/22 0948 **OR** acetaminophen (TYLENOL) suppository 650 mg, 650 mg, Rectal, Q6H PRN, Oumar Morrison MD =======================================================================================     PHYSICAL EXAM:  Recent vital signs and recent I/Os reviewed by me. Wt Readings from Last 3 Encounters:   11/14/22 220 lb 7.4 oz (100 kg)   09/02/22 234 lb 6.4 oz (106.3 kg)   06/02/22 240 lb (108.9 kg)     BP Readings from Last 3 Encounters:   11/14/22 (!) 147/70   09/02/22 124/70   06/30/22 124/63     Patient Vitals for the past 24 hrs:   BP Temp Temp src Pulse Resp SpO2 Weight   11/14/22 1217 -- -- -- 78 20 90 % --   11/14/22 1200 (!) 147/70 99 °F (37.2 °C) Temporal 83 19 92 % --   11/14/22 1008 138/69 98.9 °F (37.2 °C) Temporal 80 24 93 % --   11/14/22 1000 -- -- -- 85 -- -- --   11/14/22 0900 -- -- -- 78 28 93 % --   11/14/22 0400 134/72 -- -- 70 27 93 % 220 lb 7.4 oz (100 kg)   11/14/22 0341 -- -- -- 79 13 92 % --   11/14/22 0004 -- -- -- 78 20 93 % --   11/14/22 0000 (!) 144/80 97.2 °F (36.2 °C) Temporal 82 23 93 % --   11/13/22 2157 -- -- -- 81 19 95 % --   11/13/22 2156 -- -- -- 76 21 96 % --   11/13/22 2000 126/69 96.9 °F (36.1 °C) Temporal 80 29 95 % --   11/13/22 1954 -- -- -- 82 -- -- --   11/13/22 1900 130/88 -- -- 67 12 96 % --   11/13/22 1800 109/83 -- -- 74 23 96 % --   11/13/22 1700 115/62 -- -- 80 19 -- --   11/13/22 1605 -- -- -- 78 19 96 % --   11/13/22 1600 123/65 97.7 °F (36.5 °C) Temporal 78 21 95 % --   11/13/22 1500 113/63 -- -- 78 19 95 % --       Intake/Output Summary (Last 24 hours) at 11/14/2022 1429  Last data filed at 11/14/2022 1014  Gross per 24 hour   Intake 10 ml   Output --   Net 10 ml         Physical Exam  Vitals reviewed. Constitutional:       General: He is   in acute distress. Appearance: Normal appearance. He is ill-appearing. HENT:      Head: Normocephalic and atraumatic. Right Ear: External ear normal.      Left Ear: External ear normal.      Nose: Nose normal.      Mouth/Throat:      Mouth: Mucous membranes are moist. Mucous membranes are not dry.    Eyes: General: No scleral icterus. Conjunctiva/sclera: Conjunctivae normal.   Neck:      Vascular: No JVD. Cardiovascular:      Rate and Rhythm: Normal rate and regular rhythm. Heart sounds: S1 normal and S2 normal.   Pulmonary:      Effort: Pulmonary effort is ab-normal. . Needing NIPPV     Breath sounds: Rhonchi present. Abdominal:      General: Bowel sounds are normal. There is   distension. Musculoskeletal:         General: Swelling (mild) present. No deformity. Cervical back: Normal range of motion and neck supple. Skin:     General: Skin is dry. Coloration: Skin is not jaundiced. Neurological:      Mental Status: Drowsy, arouses able,  Psychiatric:      Unable to fully obtain    =======================================================================================     DATA:  Diagnostic tests reviewed by me for today's visit:   (AS NEEDED FOR MY EVALUATION AND MANAGEMENT). Recent Labs     11/12/22 0623 11/13/22 0413 11/14/22 0356   WBC 14.7* 13.2* 16.1*   HCT 34.9* 35.1* 35.1*    180 197     Iron Saturation:  No components found for: PERCENTFE  FERRITIN:  No results found for: FERRITIN  IRON:  No results found for: IRON  TIBC:  No results found for: TIBC    Recent Labs     11/12/22 0623 11/13/22 0413 11/14/22 0356    140 136   K 4.7 5.0 5.2*    111* 107   CO2 22 22 19*   BUN 37* 41* 43*   CREATININE 1.3 1.4* 1.2     Recent Labs     11/12/22 0623 11/13/22 0413 11/14/22 0356   CALCIUM 9.3 9.3 9.2   MG  --  2.80* 2.70*   PHOS 2.7 2.9 2.3*     No results for input(s): PH, PCO2, PO2 in the last 72 hours.     Invalid input(s): Chelsea Hinton    ABG:  No results found for: PH, PCO2, PO2, HCO3, BE, THGB, TCO2, O2SAT  VBG:    Lab Results   Component Value Date/Time    PHVEN 7.396 11/10/2022 11:37 AM    YNK3ZHP 24.7 11/10/2022 11:37 AM    BEVEN -8.1 11/10/2022 11:37 AM    G4RWIPTX 100 11/10/2022 11:37 AM       LDH:  No results found for: LDH  Uric Acid: Lab Results   Component Value Date/Time    LABURIC 7.9 11/10/2022 05:59 AM       PT/INR:    Lab Results   Component Value Date/Time    PROTIME 12.6 06/25/2010 06:30 AM    INR 1.19 06/25/2010 06:30 AM     Warfarin PT/INR:  No components found for: PTPATWAR, PTINRWAR  PTT:    Lab Results   Component Value Date/Time    APTT 34.6 06/25/2010 06:30 AM   [APTT}  Last 3 Troponin:    Lab Results   Component Value Date/Time    TROPONINI 0.01 11/08/2022 02:57 PM       U/A:    Lab Results   Component Value Date/Time    NITRITE neg 02/01/2018 03:01 PM    COLORU Yellow 11/09/2022 02:30 PM    PROTEINU 100 11/09/2022 02:30 PM    PHUR 6.0 11/09/2022 02:30 PM    WBCUA 1 11/09/2022 02:30 PM    RBCUA 0-2 11/09/2022 02:30 PM    BACTERIA 4+ 11/09/2022 02:30 PM    CLARITYU CLOUDY 11/09/2022 02:30 PM    SPECGRAV 1.020 11/09/2022 02:30 PM    LEUKOCYTESUR SMALL 11/09/2022 02:30 PM    UROBILINOGEN 1.0 11/09/2022 02:30 PM    BILIRUBINUR Negative 11/09/2022 02:30 PM    BILIRUBINUR neg 02/01/2018 03:01 PM    BLOODU SMALL 11/09/2022 02:30 PM    GLUCOSEU Negative 11/09/2022 02:30 PM    AMORPHOUS Present 11/09/2022 02:30 PM     Microalbumen/Creatinine ratio:  No components found for: RUCREAT  24 Hour Urine for Protein:  No components found for: RAWUPRO, UHRS3, ALXX21LI, UTV3  24 Hour Urine for Creatinine Clearance:  No components found for: CREAT4, UHRS10, UTV10  Urine Toxicology:  No components found for: IAMMENTA, IBARBIT, IBENZO, ICOCAINE, IMARTHC, IOPIATES, IPHENCYC    HgBA1c:  No results found for: LABA1C  RPR:  No results found for: RPR  HIV:  No results found for: HIV  [place]:  No results found for: ANATITER, [place]  RF:  No results found for: RF  DSDNA:  No components found for: DNA  AMYLASE:  No results found for: AMYLASE  LIPASE:  No results found for: LIPASE  Fibrinogen Level:  No components found for: FIB       BELOW MENTIONED RADIOLOGY STUDY RESULTS BY ME (AS NEEDED FOR MY EVALUATION AND MANAGEMENT).      CT CHEST WO CONTRAST    Result Date: 11/8/2022  Consolidation in the mid to lower lungs is compatible with multifocal pneumonia. Suspected right hilar lymphadenopathy is likely reactive. Suggest follow-up contrast-enhanced chest CT within 3 months to assess for resolution. This report was transcribed using voice recognition software, mainly. So please excuse brevity and/or typos. Every effort was made to ensure accuracy, however, inadvertent computerized transcription errors may be present. Please contact us, if any questions or clarifications are needed.

## 2022-11-14 NOTE — PROGRESS NOTES
Pharmacy to check patient copay for  Eliquis and Xarelto    Copay for patient will be:   $29.53/month for Eliquis   $28.84/month for Xarelto     Pharmacy will continue to follow the decision on therapy and  the patient if appropriate. Jasmin Mo PharmD.   PGY-1 Resident  [id number]  11/14/22 9:34 AM

## 2022-11-14 NOTE — PROGRESS NOTES
[facility] Pulmonary/CCM Progress note      Admit Date: 11/8/2022    Chief Complaint: Respiratory distress    Subjective: Interval History: Patient's daughter is at bedside. Patient remains on heated high flow nasal cannula, 70% / 45 L, awake and alert. Appears to be in good spirits today. Elevated WBC probably related to CLL.     Scheduled Meds:   furosemide  20 mg IntraVENous Daily    enoxaparin  1 mg/kg SubCUTAneous BID    cefepime  2,000 mg IntraVENous Q12H    dexamethasone  20 mg IntraVENous Q24H    albuterol sulfate HFA  2 puff Inhalation BID    ipratropium  2 puff Inhalation BID    allopurinol  100 mg Oral Daily    donepezil  10 mg Oral Nightly    ibrutinib  420 mg Oral Nightly    levothyroxine  100 mcg Oral Daily    primidone  50 mg Oral Daily    propranolol  20 mg Oral Daily    sodium chloride flush  5-40 mL IntraVENous 2 times per day     Continuous Infusions:   sodium chloride 5 mL/hr at 11/13/22 0400     PRN Meds:sodium chloride flush, sodium chloride, ondansetron **OR** ondansetron, polyethylene glycol, acetaminophen **OR** acetaminophen    Review of Systems  Constitutional: Fatigue and malaise  Ears, nose, mouth, throat: negative for ear drainage, epistaxis, hoarseness, nasal congestion, sore throat and voice change  Respiratory: negative except for cough and shortness of breath  Cardiovascular: negative for chest pain, chest pressure/discomfort, irregular heart beat, lower extremity edema and palpitations  Gastrointestinal: negative for abdominal pain, constipation, diarrhea, jaundice, melena, odynophagia, reflux symptoms and vomiting  Hematologic/lymphatic: negative for bleeding, easy bruising, lymphadenopathy and petechiae  Musculoskeletal:negative for arthralgias, bone pain, muscle weakness, neck pain and stiff joints  Neurological: negative for dizziness, gait problems, headaches, seizures, speech problems, tremors and weakness  Behavioral/Psych: negative for anxiety, behavior problems, depression, fatigue and sleep disturbance  Endocrine: negative for diabetic symptoms including none, neuropathy, polyphagia, polyuria, polydipsia, vomiting and diarrhea and temperature intolerance  Allergic/Immunologic: negative for anaphylaxis, angioedema, hay fever and urticaria    Objective:     Patient Vitals for the past 8 hrs:   BP Temp Temp src Pulse Resp SpO2   11/14/22 1744 -- -- -- 77 24 92 %   11/14/22 1713 127/68 96.9 °F (36.1 °C) Temporal 76 22 91 %   11/14/22 1217 -- -- -- 78 20 90 %   11/14/22 1200 (!) 147/70 99 °F (37.2 °C) Temporal 83 19 92 %     I/O last 3 completed shifts: In: 798 [P.O.:240; I.V.:423.2; IV Piggyback:134.7]  Out: -   I/O this shift:  In: 130 [P.O.:120;  I.V.:10]  Out: 250 [Urine:250]    General Appearance: alert and oriented to person, place and time, well developed and well- nourished, in no acute distress  Skin: warm and dry, no rash or erythema  Head: normocephalic and atraumatic  Eyes: pupils equal, round, and reactive to light, extraocular eye movements intact, conjunctivae normal  ENT: external ear and ear canal normal bilaterally, nose without deformity, nasal mucosa and turbinates normal  Neck: supple and non-tender without mass, no cervical lymphadenopathy  Pulmonary/Chest: rales present-bibasal  Cardiovascular: normal rate, regular rhythm,  no murmurs, rubs, distal pulses intact, no carotid bruits  Abdomen: soft, non-tender, non-distended, normal bowel sounds, no masses or organomegaly  Lymph Nodes: Cervical, supraclavicular normal  Extremities: no cyanosis, clubbing or edema  Musculoskeletal: normal range of motion, no joint swelling, deformity or tenderness  Neurologic: alert, no focal neurologic deficits    Data Review:  CBC:   Lab Results   Component Value Date/Time    WBC 16.1 11/14/2022 03:56 AM    RBC 4.21 11/14/2022 03:56 AM     BMP:   Lab Results   Component Value Date/Time    GLUCOSE 120 11/14/2022 03:56 AM    CO2 19 11/14/2022 03:56 AM    BUN 43 11/14/2022 03:56 AM CREATININE 1.2 11/14/2022 03:56 AM    CALCIUM 9.2 11/14/2022 03:56 AM     ABG: No results found for: ACC1QMV, BEART, R9PBGIEZ, PHART, THGBART, AVG0GCK, PO2ART, RBW5YQG    Radiology: All pertinent images / reports were reviewed as a part of this visit. Narrative   EXAMINATION:   ONE XRAY VIEW OF THE CHEST       11/14/2022 10:15 am       COMPARISON:   None. HISTORY:   ORDERING SYSTEM PROVIDED HISTORY: check for infiltrates   TECHNOLOGIST PROVIDED HISTORY:   Reason for exam:->check for infiltrates   Reason for Exam: Check for infiltrates       FINDINGS:   Scattered bilateral airspace infiltrates. No pneumothorax or pleural   effusion. Mild cardiomegaly. Impression   Scattered bilateral airspace infiltrates consistent with multifocal pneumonia. Problem List:     Acute hypoxic respiratory failure  Multifocal pneumonia related to COVID-19 infection  Acute on chronic CKD  CLL    Assessment/Plan:     Acute hypoxic respiratory failure related to severe COVID-19 infection and ARDS. Still requiring heated high flow nasal cannula, no meaningful improvement noted since hospitalization. Continue Decadron 20 mg IV daily. X-ray shows mild improvement in bilateral infiltrates. Procalcitonin elevated-completing course of empiric cefepime. , slightly improved-recheck tomorrow a.m. Leukocytosis, WBC 16, most likely related to CLL. Might also be related to steroid use. Monitor only. ROSIE, improved-creatinine 1.2. Urine output appears to be poor, monitor. Patient is already receiving therapeutic Lovenox for history of atrial flutter. Critical care team will follow. Patient remains limited code, DNR/DNI-patient/family not ready for comfort care measures. Plan discussed with patient's daughter.     Ad Schafer MD

## 2022-11-14 NOTE — PROGRESS NOTES
Morning assessment complete. Patient alert and oriented with intermittent confusion. Very short of breath when speaking. Unable to speak in full sentences. Patient is on airvo 50L 70%. Code status changed to no intubation. Daughter at bedside. Repeat chest xray ordered. Crackles heard throughout and diminished in bases. Oxygen level is stable ranging from 91 to 94%. Will continue to monitor.

## 2022-11-14 NOTE — CONSULTS
Palliative Care:        Illness (Wife concerned for \"illness\" with , PCP requesting xray. ) and Cough (X3 days )    H&P: 80 y.o. male who presented with fevers cough onset of symptoms 3 days ago. Patient wife with similar symptoms of viral URI. Patient has 5 just having fever of 101. Patient with history of CLL. Denies any nausea vomiting this generalized fatigue weakness cough nausea significant chest pain or shortness of breath. Nothing that makes it better or worse. Admit: 11/8/2022  Consult: 11/11/2022    Past Medical History:   has a past medical history of Bilateral cataracts, Bladder stones, Blepharitis, Cancer ([address] Hypertension, Kidney stones, Obesity, Pneumonia, and SOB (shortness of breath). Past Surgical History:   has a past surgical history that includes Skin cancer excision; Cholecystectomy; Prostatectomy; Colonoscopy; Cystocopy (8/13/12); other surgical history (8/25/12); and eye surgery. Advance Directives:        Advance Care Planning   The patient has the following advanced directives on file:  Advance Directives       Power of  Living Will ACP-Advance Directive ACP-Power of     Not on File Filed on 11/07/16 [facility]     The patient has appointed the following active healthcare agents:    Primary Decision Maker: Reza Duarte - Spouse - [phone]    The Patient has the following current code status:    Code Status: Full Code    Extended Emergency Contact Information  Primary Emergency Contact: Jarad Larsen  Address: [facility], [address] [place] [address]  Home Phone: [phone]  Relation: Spouse    Problem Severity: Pain/Other Symptoms:        Weight 220#  Body mass index is 28.31 kg/m².     Bed Mobility/Toileting/Transfer:        No PT/OT notes    Performance Status:        Palliative Performance Scale:  100% []Full Normal activity & work No evidence of disease  90%   [] Full Normal activity & work Some evidence of disease  80%   [] Full Normal activity with Effort Some evidence of disease  70%   [] Reduced Unable Normal Job/Work Significant disease Full Normal or reduced  60%   [] Ambulation reduced; Significant disease; Can't do hobbies/housework; intake normal   or reduced; occasional assist; LOC full/confusion  50%   [] Mainly sit/lie; Extensive disease; Can't do any work; Considerable assist; intake normal  Or reduced; LOC full/confusion  40%   [x] Mainly in bed; Extensive disease; Mainly assist; intake normal or reduced; occasional assist; LOC full/confusion  30%   [] Bed Bound; Extensive disease; Total care; intake reduced; LOC full/confusion  20%   [] Bed Bound; Extensive disease; Total care; intake minimal; Drowsy/coma  10%   [] Bed Bound; Extensive disease; Total care; Mouth care only; Drowsy/coma    PPS 40% - HHFNC dependent; 70% O2, 50LPM    Symptom Assessment: Appetite/Nausea/Bowels/Fatigue:        No intake or output data in the 24 hours ending 11/14/22 0831  ADULT DIET; Regular  ADULT ORAL NUTRITION SUPPLEMENT; Lunch, Dinner; Standard High Calorie/High Protein Oral Supplement    Social History:   reports that he quit smoking about 38 years ago. He has a 38.00 pack-year smoking history. He has never used smokeless tobacco. He reports that he does not drink alcohol and does not use drugs. Family History:  family history includes Bleeding Prob (age of onset: 47) in his father; Dementia in his mother; High Blood Pressure in his mother; High Cholesterol in his mother; Other (age of onset: [de-identified]) in his mother. Psychological/Spiritual:             Family Discussion:        All MD/RN notes and personal interaction indicate that pt is intermittently capable of independent decision making. Pt and family have requested meeting with Palliative; LVM for pt's wife requesting call back to set up meeting today. Will follow up and support patient/family as time allows or circumstances dictate.  Please reach out via [id number], Flit, or email [email or web address] if pt condition changes significantly or if family requests support. Addendum: Spoke via phone with pt's wife, Danyell Ramirez, regarding pt's current status, POC, and prognosis. Discussed pt's code status, options, and implications. Pt and family indicated pt should NOT be intubated for any reason, and, in the event of cardiac or respiratory arrest, pt should not receive CPR (no intubation, no compressions, no shock, no meds). Pt's preferences will best be reflected by LIMITED CODE - no x4; \"DO NOT INTUBATE FOR ANY REASON\".    RN, MD informed. Orders updated. Will follow up and support patient/family as time allows or circumstances dictate. Please reach out via [id number], PerfectServe, or email [email or web address] if pt condition changes significantly or if family requests support. Thank you.     Electronically signed by Aylin Paz RN, BSN on 11/14/2022 at 9:20 AM

## 2022-11-14 NOTE — CONSULTS
[address]   Electrophysiology Nurse Practitioner  Consult    Date: 11/14/2022  Date of admission: 11/8/2022  1:28 PM  Reason for Admission: Septicemia ([facility] [A41.9]  Abnormal CXR [R93.89]  Acute respiratory failure with hypoxia ([facility] [J96.01]  Atrial flutter by electrocardiogram ([facility] [I48.92]  Sepsis ([facility] [A41.9]  History of chronic lymphocytic leukemia [Z85.6]  Community acquired pneumonia, unspecified laterality [J18.9]    Consult Requesting Physician: Rina Estevez MD    -Reason for Consultation: Atrial Flutter    Chief Complaint   Patient presents with    Illness     Wife concerned for \"illness\" with , PCP requesting xray. Cough     X3 days        HISTORY OF PRESENT ILLNESS: History obtained from patient and medical record. Milton Tee is a 80 y.o. male with a past medical history of dementia, CLL, prostate cancer, aortic insufficiency, CAD, and HTN. Pt presented to hospital with cough, fever and SOB. He was found to have ROSIE and covid pneumonia with worsening respiratory status requiring transfer to ICU. On arrival to ER, he was found to be in typical atrial flutter, thus EP consultation. His last EKG in 2018 was sinus rhythm. He remains critically ill in ICU on Airvo. He denies any known history of arrhythmias. Denies having chest pain, palpitations, or dizziness at the time of this visit. Allergies: Allergies   Allergen Reactions    Oxycodone-Acetaminophen Other (See Comments)     halucinations  Other reaction(s): hallucinations    Adhesive Tape      Skin becomes raw     Home Meds:  Prior to Visit Medications    Medication Sig Taking?  Authorizing Provider   amLODIPine (NORVASC) 10 MG tablet TAKE 1 TABLET BY MOUTH DAILY  Corry Portillo MD   torsemide (DEMADEX) 20 MG tablet TAKE 1 TABLET BY MOUTH DAILY  FAIZAN Downing CNP   potassium chloride (KLOR-CON M) 10 MEQ extended release tablet TAKE 1 TABLET BY MOUTH DAILY  FAIZAN Downing CNP donepezil (ARICEPT) 10 MG tablet Take 1 tablet by mouth nightly  Dinah Blow, APRN - CNP   primidone (MYSOLINE) 50 MG tablet TAKE 1 TABLET BY MOUTH DAILY  Dinah Blow, APRN - CNP   levothyroxine (SYNTHROID) 100 MCG tablet Take 1 tablet by mouth Daily  Dinah Blow, APRN - CNP   propranolol (INDERAL) 20 MG tablet TAKE 1 TABLET BY MOUTH DAILY  Edil Ng MD   allopurinol (ZYLOPRIM) 100 MG tablet Take 100 mg by mouth daily  Historical Provider, MD   ibrutinib (IMBRUVICA) 420 MG tablet Take 420 mg by mouth daily  Historical Provider, MD   Calcium Carb-Cholecalciferol (CALCIUM/VITAMIN D PO) Take by mouth 1200 mg qd  Historical Provider, MD   multivitamin (ANTIOXIDANT;PROSIGHT) TABS per tablet Take 1 tablet by mouth daily. Historical Provider, MD      Past Medical History:  Past Medical History:   Diagnosis Date    Bilateral cataracts     Bladder stones     Blepharitis     Cancer ([facility]     prostate    Hypertension     Kidney stones     with bladder stones    Obesity     Pneumonia     SOB (shortness of breath)       Past Surgical History:    has a past surgical history that includes Skin cancer excision; Cholecystectomy; Prostatectomy; Colonoscopy; Cystocopy (8/13/12); other surgical history (8/25/12); and eye surgery. Social History:  Reviewed. reports that he quit smoking about 38 years ago. He has a 38.00 pack-year smoking history. He has never used smokeless tobacco. He reports that he does not drink alcohol and does not use drugs. Family History:  Reviewed. family history includes Bleeding Prob (age of onset: 47) in his father; Dementia in his mother; High Blood Pressure in his mother; High Cholesterol in his mother; Other (age of onset: [de-identified]) in his mother. Review of System:  Constitutional: Positive for fatigue.  Negative for fever, night sweats, chills, weight changes, or weakness  Skin: Negative for rash, dry skin, pruritus, bruising, bleeding, blood clots, or changes in skin pigment  HEENT: Negative for vision changes, ringing in the ears, sore throat, dysphagia, or swollen lymph nodes  Respiratory: Positive for SOB, cough  Cardiovascular: Reviewed in HPI  Gastrointestinal: Negative for abdominal pain, N/V/D, constipation, or black/tarry stools  Genito-Urinary: Negative for dysuria, incontinence, urgency, or hematuria  Musculoskeletal: Positive for weakness. Negative for joint swelling, muscle pain, or injuries  Neurological/Psych: Negative for seizures, headaches, balance issues or TIA-like symptoms. No anxiety, depression, or insomnia    Physical Examination:  Vitals:    11/14/22 0400   BP: 134/72   Pulse: 70   Resp: 27   Temp:    SpO2: 93%      No intake/output data recorded. Wt Readings from Last 3 Encounters:   11/14/22 220 lb 7.4 oz (100 kg)   09/02/22 234 lb 6.4 oz (106.3 kg)   06/02/22 240 lb (108.9 kg)       Telemetry: Personally Reviewed  - Atrial Flutter, rate controlled  Constitutional: Cooperative and in no apparent distress, and appears ill  Skin: Warm and pink; no pallor, cyanosis, bruising, or clubbing  HEENT: Symmetric and normocephalic. PERRL, EOM intact. Conjunctiva pink with clear sclera. Mucus membranes pink and moist. Teeth intact. Thyroid smooth without nodules or goiter. Cardiovascular: Regular rate and irregular rhythm. S1/S2 present without murmurs, rubs, or gallops. Peripheral pulses 2+, capillary refill < 3 seconds. No peripheral edema, no elevation of JVP  Respiratory: Respirations symmetric and unlabored. Lungs diminished with rhonchi to auscultation bilaterally, no wheezing, crackles. On Airvo oxygenation  Gastrointestinal: Abdomen soft and rotund. Bowel sounds normoactive in all quadrants without tenderness or masses. Musculoskeletal: + Generalized weakness. Neurologic/Psych: Awake and orientated to person, place and time.  Calm affect, appropriate mood    Pertinent labs, diagnostic, device, and imaging results reviewed as a part of this visit    Labs:  BMP:   Recent Labs     22  0356    140 136   K 4.7 5.0 5.2*    111* 107   CO2  19*   PHOS 2.7 2.9 2.3*   BUN 37* 41* 43*   CREATININE 1.3 1.4* 1.2   MG  --  2.80* 2.70*     Estimated Creatinine Clearance: 54 mL/min (based on SCr of 1.2 mg/dL). CBC:   Recent Labs     22   WBC 14.7* 13.2* 16.1*   HGB 11.4* 11.4* 11.5*   HCT 34.9* 35.1* 35.1*   MCV 83.0 83.1 83.4    180 197     Thyroid:   Lab Results   Component Value Date/Time    TSH 4.85 2022 02:35 PM     Lipids:  Lab Results   Component Value Date/Time    CHOL 130 10/23/2020 12:03 PM    HDL 41 10/23/2020 12:03 PM    HDL 34 2011 09:30 AM    TRIG 110 10/23/2020 12:03 PM     LFTS:   Lab Results   Component Value Date/Time    ALT 61 2022 03:56 AM    AST 41 2022 03:56 AM    ALKPHOS 45 2022 03:56 AM    PROT 5.4 2022 03:56 AM    PROT 7.0 2013 09:16 AM    AGRATIO 0.9 2022 03:56 AM    BILITOT 0.4 2022 03:56 AM     Cardiac Enzymes:   Lab Results   Component Value Date/Time    TROPONINI 0.01 2022 02:57 PM     Coags:   Lab Results   Component Value Date/Time    PROTIME 12.6 2010 06:30 AM    INR 1.19 2010 06:30 AM       EC22  Typical atrial flutter    ECHO:  2016   Normal left ventricular wall thickness and systolic function with an estimated ejection fraction of 50-55%. No regional wall motion abnormalities are seen. Left ventricular size is mildly increased . There is reversal of E/A inflow velocities across the mitral valve suggesting impaired left ventricular relaxation   Trivial mitral regurgitation is present. Mild aortic regurgitation is present. The aortic root is mildly dilated. There is mild-moderate tricuspid regurgitation with RVSP estimated at 36 mmHg. CT Chest: 22  Consolidation in the mid to lower lungs is compatible with multifocal   pneumonia. Suspected right hilar lymphadenopathy is likely reactive. Suggest follow-up contrast-enhanced chest CT within 3 months to assess for   resolution. Problem List:   Patient Active Problem List    Diagnosis Date Noted    Acute renal failure superimposed on chronic kidney disease ([facility] 11/13/2022    CLL (chronic lymphocytic leukemia) ([facility] 11/13/2022    Sepsis with acute hypoxic respiratory failure without septic shock ([address] 11/13/2022    Acute respiratory failure with hypoxia ([address] 11/12/2022    Atrial flutter by electrocardiogram ([address] 11/12/2022    History of chronic lymphocytic leukemia 11/12/2022    Pneumonia due to COVID-19 virus 11/12/2022    Community acquired pneumonia 11/12/2022    Septicemia ([address] 11/08/2022    Chronic renal disease, stage III ([address] [700877] 06/02/2022    Panlobular emphysema ([facility] 03/01/2022    Chronic lymphocytic leukemia of B-cell type not having achieved remission ([facility] 06/29/2021    Dementia without behavioral disturbance, unspecified dementia type 06/28/2021    Coagulation defect ([facility] 06/28/2021    Malignant neoplasm of prostate ([facility] 06/28/2021    Degeneration of intervertebral disc of lumbar region 11/20/2019    Lumbar radiculopathy 11/20/2019    Obesity (BMI 30-39.9) 01/18/2019    Vitamin D deficiency 11/13/2017    Essential hypertension 03/16/2017    Coronary artery disease due to lipid rich plaque 08/27/2015    PAF (paroxysmal atrial fibrillation) ([address] 04/06/2012    Nonrheumatic aortic valve insufficiency 04/06/2012    Abnormal stress test 04/06/2012    Atherosclerosis of native artery of extremity with intermittent claudication ([address] 08/01/2011    Pure hypercholesterolemia 08/01/2011    History of malignant neoplasm of prostate 08/01/2011    Kidney stones         Assessment and Plan: 1. Typical Atrial Flutter  - Currently in AFL, rate controlled  - Continue propranolol 20 mg QD    - [id number] score:high; [id number] Vasc score and anticoagulation discussed.  High risk for stroke and thromboembolism. Anticoagulation is recommended. Risk of bleeding was discussed. ~ Currently on lovenox BID. I discussed anticoagulation to decrease the risk of thromboembolic events including stroke. Benefits and alternatives were discussed with patient. Risk of bleeding was discussed. Patient verbalized understanding. Different forms of anticoagulants including coumadin, Pradaxa, Eliquis, and Xarelto were discussed. I had a long discussion with his daughter about anti-coagulation. He currently takes primidone for his tremors, which cannot be taken with DOACs. He either needs to stop primidone to use DOAC or continue primidone and go on coumadin therapy as he is high risk for thromboembolism/stroke due to atrial flutter, as well as post covid    - At this point, he is a poor candidate for EP procedures due to his acute pulmonary issues, advanced age, and dementia. It is possible that he may convert back to sinus rhythm on his own following improvement in his pulmonary status. If he does not, we can discuss cardioversion vs ablation further as outpatient, however, he has declined coronary cath in the past so I doubt he would be interested in ablation    2. HTN  - Controlled: Goal <130/80  - Continue current medications    3. Acute Respiratory Failure, Covid PNA   - Unstable, remains on airvo   - On medical therapy   - Management per pulmonology    4. CAD   - Hx of abnormal GXT (2012)    ~ Declined LHC   - No CP  - On BB   - Pt followed by Dr. Bhavik Segura as outpatient    5. ROSIE   - Stable   - Monitor UO closely   - Encourage adequate PO intake    No further EP recommendations. Will have him follow up in office in 6 weeks with general cardiology. If he remains in atrial flutter, he can follow up with EP team. EP will sign off. Please call if there are any questions. Thank you for consult.      All pertinent information and plan of care discussed with the EP physician, Dr. Reno Gaona    All questions and concerns were addressed to the patientAlternatives to my treatment were discussed. I have discussed the above stated plan and the patient verbalized understanding and agreed with the plan. Discussed plan with patient and nurse. Spoke to daughter at bedside.      Thank you for allowing to us to participate in the care of ANA Warenr  [address]   Office: [phone]

## 2022-11-14 NOTE — PROGRESS NOTES
Shift summary:    No significant events. Family updated with pt condition and plan of care. Family would like family meeting to determine discharge plan and meet with palliative care. Message left with palliative care RN Stew Rico.

## 2022-11-15 NOTE — PROGRESS NOTES
MD Renetta Sommer MD Manuela Sallies, MD                  Office: [phone]                      Fax: [phone] [address] [facility]                   NEPHROLOGY INPATIENT PROGRESS NOTE:     PATIENT NAME: Shane Hyatt  : 1934  MRN: [id number]      RECOMMENDATIONS:   -Added Lasix 20 IV QD -> increase to BID  - pulmonary fup for worsening hypoxia, COVID-19 mx   - multifocal pneumonia pic on CT, no major fluid overload     -Stopped bicarb infusion  - COVID-19 mx as per pulmonary team  - trend serum uric acid  -f/up w/ hem-onc   - monitor CBC w/ h/o CLL-diagnosed in 2021, was on ibrutinib  - IV antibiotic: Vancomycin: trough goal <15, pharmacy team assisting.   - monitor PVR w/ bladder scan for willis insertion need. - at higher risk for decompensation, needing closer monitoring.     D/C plan from renal stand point: fup w/ hypoxia   Have D/W patient, his wife team hospitalist     IMPRESSION:       Admitted on:  2022  1:28 PM   For:  Septicemia ([facility] [address] [A41.9]  Abnormal CXR [R93.89]  Acute respiratory failure with hypoxia (HCC) [J96.01]  Atrial flutter by electrocardiogram ([address] [I48.92]  Sepsis ([address] [A41.9]  History of chronic lymphocytic leukemia [Z85.6]  Community acquired pneumonia, unspecified laterality [J18.9]   SARS-CoV-2 (+)  Hypoxia on 3L NC     ROSIE (on non-proteinuric CKD: stage 3B):   - BL Scr- lowest recently 1.4-1.5, as off 22   ->  1.8 on admission  - Etiology of ROSIE - presumed pre-renal now    - other differentials: unlikely  GN / TI / TMA process  - UA : needs it  - Renal imaging: on : 3-2021 w IV dye CT - Tiny nonobstructing right renal stone      Associated problems:   - Volume status: mild hypo-volemic  : HTN : no need for tight control    : Na: hyponatremia - mild 130 on admission, no recent h/o hypoNa, so likely acute, so should be ok for acute reversal     - Azotemia: pre-renal   - Electrolytes: K: WNL  - Acid-Base: acidosis, non-AGMA 15 on admission, LA WNL- checked blood gas - likely [email or web address]    - Anemia: of chronic disease        Other major problems: Management per primary and other consulting teams. H/O CLL     - CT chest -   Consolidation in the mid to lower lungs is compatible with multifocal pneumonia. Suspected right hilar lymphadenopathy is likely reactive. Suggest follow-up contrast-enhanced chest CT within 3 months to assess for resolution. -- would need fup w/ PCP      Hospital Problems             Last Modified POA    * (Principal) Septicemia ([address] 11/12/2022 Yes    Acute respiratory failure with hypoxia ([address] 11/12/2022 Yes    Atrial flutter by electrocardiogram ([address] 11/12/2022 Yes    History of chronic lymphocytic leukemia 11/12/2022 Yes    COVID-19 11/14/2022 Yes    Community acquired pneumonia 11/12/2022 Yes    ROSIE (acute kidney injury) ([address] 11/14/2022 Yes    CLL (chronic lymphocytic leukemia) ([address] 11/13/2022 Yes    Sepsis with acute hypoxic respiratory failure without septic shock ([address] 11/13/2022 Yes    Typical atrial flutter ([address] 11/14/2022 Yes    ARDS (adult respiratory distress syndrome) ([address] 11/14/2022 Yes    Primary hypertension 11/14/2022 Yes   : other supportive care :   - Check daily renal function panel with electrolytes-phosphorus  - Strict monitoring of I/Os, daily weight  - Renal feeds/diet  - Current medications reviewed. - Nephrotoxic medications have been discontinued. - Dose adjusted and appropriate. - Dose meds for eGFR <15 mL/min/1.73m2 during ROSIE    - Avoid heavy opioids due to renal failure - may use very low dose dilaudid / fentanyl with close monitoring of CNS and respiratory depression. Please refer to the orders. High Complexity. Multiple complex problems. Discussed with patient and treatment team-    Time spent > 30 (~35) minutes. Thank you for allowing me to participate in this patient's care. Please do not hesitate to contact me anytime.  We will follow along with you. Marianne Tompkins MD,  [facility] [address] [place]: [phone] or [address]  Fax: [phone]        =======================================================================================   =======================================================================================  Subjective / interval history: worsening hypoxia, needing more O2   Overall still worsening hypoxia requirement increasing,  Renal function stable overall. Given lasix   K better    Past medical, Surgical, Social, Family medical history reviewed by me. MEDICATIONS: reviewed by me. Medications Prior to Admission:  No current facility-administered medications on file prior to encounter. Current Outpatient Medications on File Prior to Encounter   Medication Sig Dispense Refill    amLODIPine (NORVASC) 10 MG tablet TAKE 1 TABLET BY MOUTH DAILY 90 tablet 3    torsemide (DEMADEX) 20 MG tablet TAKE 1 TABLET BY MOUTH DAILY 30 tablet 5    potassium chloride (KLOR-CON M) 10 MEQ extended release tablet TAKE 1 TABLET BY MOUTH DAILY 90 tablet 3    donepezil (ARICEPT) 10 MG tablet Take 1 tablet by mouth nightly 90 tablet 3    primidone (MYSOLINE) 50 MG tablet TAKE 1 TABLET BY MOUTH DAILY 90 tablet 3    levothyroxine (SYNTHROID) 100 MCG tablet Take 1 tablet by mouth Daily 90 tablet 3    propranolol (INDERAL) 20 MG tablet TAKE 1 TABLET BY MOUTH DAILY 90 tablet 3    allopurinol (ZYLOPRIM) 100 MG tablet Take 100 mg by mouth daily      ibrutinib (IMBRUVICA) 420 MG tablet Take 420 mg by mouth daily      Calcium Carb-Cholecalciferol (CALCIUM/VITAMIN D PO) Take by mouth 1200 mg qd      multivitamin (ANTIOXIDANT;PROSIGHT) TABS per tablet Take 1 tablet by mouth daily.            Current Facility-Administered Medications:     furosemide (LASIX) injection 20 mg, 20 mg, IntraVENous, BID, Marianne Tompkins MD    enoxaparin (LOVENOX) injection 100 mg, 1 mg/kg, SubCUTAneous, BID, Noé Schafer MD, 100 mg at 11/15/22 0910    cefepime (MAXIPIME) 2000 mg IVPB minibag, 2,000 mg, IntraVENous, Q12H, Della Chong MD, Stopped at 11/15/22 1028    dexamethasone (DECADRON) 20 mg in sodium chloride 0.9 % 50 mL IVPB, 20 mg, IntraVENous, Q24H, Cristel Freedman MD, Stopped at 11/14/22 1129    albuterol sulfate HFA (PROVENTIL;VENTOLIN;PROAIR) 108 (90 Base) MCG/ACT inhaler 2 puff, 2 puff, Inhalation, BID, Devendra Jensen MD, 2 puff at 11/14/22 2105    ipratropium (ATROVENT HFA) 17 MCG/ACT inhaler 2 puff, 2 puff, Inhalation, BID, Odalis Nazario MD, 2 puff at 11/14/22 2105    allopurinol (ZYLOPRIM) tablet 100 mg, 100 mg, Oral, Daily, Devendra Jensen MD, 100 mg at 11/15/22 0909    donepezil (ARICEPT) tablet 10 mg, 10 mg, Oral, Nightly, Devendra Jensen MD, 10 mg at 11/14/22 2037    ibrutinib (IMBRUVICA) chemo tablet 420 mg PATIENT SUPPLIED (Patient Supplied), 420 mg, Oral, Nightly, Devendra Jensen MD, 420 mg at 11/14/22 2035    levothyroxine (SYNTHROID) tablet 100 mcg, 100 mcg, Oral, Daily, Devendra Jensen MD, 100 mcg at 11/15/22 0630    primidone (MYSOLINE) tablet 50 mg, 50 mg, Oral, Daily, Odalis Nazario MD, 50 mg at 11/15/22 0910    propranolol (INDERAL) tablet 20 mg, 20 mg, Oral, Daily, Odalis Nazario MD, 20 mg at 11/15/22 0909    sodium chloride flush 0.9 % injection 5-40 mL, 5-40 mL, IntraVENous, 2 times per day, Odalis Nazario MD, 10 mL at 11/15/22 0910    sodium chloride flush 0.9 % injection 5-40 mL, 5-40 mL, IntraVENous, PRN, Devendra Jensen MD    0.9 % sodium chloride infusion, , IntraVENous, PRN, Odalis Nazario MD, Last Rate: 5 mL/hr at 11/13/22 0400, Rate Verify at 11/13/22 0400    ondansetron (ZOFRAN-ODT) disintegrating tablet 4 mg, 4 mg, Oral, Q8H PRN **OR** ondansetron (ZOFRAN) injection 4 mg, 4 mg, IntraVENous, Q6H PRN, Devendra Jensen MD    polyethylene glycol (GLYCOLAX) packet 17 g, 17 g, Oral, Daily PRN, Odalis Nazario MD    acetaminophen (TYLENOL) tablet 650 mg, 650 mg, Oral, Q6H PRN, 650 mg at 11/12/22 0948 **OR** acetaminophen (TYLENOL) suppository 650 mg, 650 mg, Rectal, Q6H PRN, Maliha Quintanilla MD         =======================================================================================     PHYSICAL EXAM:  Recent vital signs and recent I/Os reviewed by me.      Wt Readings from Last 3 Encounters:   11/15/22 211 lb 10.3 oz (96 kg)   09/02/22 234 lb 6.4 oz (106.3 kg)   06/02/22 240 lb (108.9 kg)     BP Readings from Last 3 Encounters:   11/15/22 (!) 140/99   09/02/22 124/70   06/30/22 124/63     Patient Vitals for the past 24 hrs:   BP Temp Temp src Pulse Resp SpO2 Height Weight   11/15/22 0907 (!) 140/99 98.2 °F (36.8 °C) Temporal 89 25 96 % -- --   11/15/22 0700 130/81 -- -- 87 21 92 % -- --   11/15/22 0628 -- -- -- -- -- -- 6' 2\" (1.88 m) 211 lb 10.3 oz (96 kg)   11/15/22 0600 -- -- -- 90 21 -- -- --   11/15/22 0500 (!) 133/59 -- -- 82 22 94 % -- --   11/15/22 0402 -- -- -- 74 25 96 % -- --   11/15/22 0400 (!) 145/72 97.7 °F (36.5 °C) Temporal 66 24 94 % -- --   11/15/22 0300 (!) 170/77 -- -- 69 22 95 % -- --   11/15/22 0200 136/77 -- -- 76 24 96 % -- --   11/15/22 0100 (!) 147/71 -- -- 72 22 95 % -- --   11/15/22 0031 -- -- -- 72 21 95 % -- --   11/15/22 0000 129/77 98.3 °F (36.8 °C) Temporal 77 16 98 % -- --   11/14/22 2300 127/71 -- -- 84 22 -- -- --   11/14/22 2200 134/70 -- -- 85 19 92 % -- --   11/14/22 2106 -- -- -- 83 26 94 % -- --   11/14/22 2105 -- -- -- 81 26 93 % -- --   11/14/22 2100 131/78 -- -- 83 23 94 % -- --   11/14/22 2000 129/72 97.4 °F (36.3 °C) Temporal 87 17 91 % -- --   11/14/22 1900 137/69 -- -- 81 26 97 % -- --   11/14/22 1744 -- -- -- 77 24 92 % -- --   11/14/22 1713 127/68 96.9 °F (36.1 °C) Temporal 76 22 91 % -- --   11/14/22 1217 -- -- -- 78 20 90 % -- --   11/14/22 1200 (!) 147/70 99 °F (37.2 °C) Temporal 83 19 92 % -- --         Intake/Output Summary (Last 24 hours) at 11/15/2022 1057  Last data filed at 11/15/2022 6507  Gross per 24 hour   Intake 120 ml   Output 900 ml   Net -780 ml           Physical Exam  Vitals reviewed. Constitutional:       General: He is   in acute distress. Appearance: Normal appearance. He is ill-appearing. HENT:      Head: Normocephalic and atraumatic. Right Ear: External ear normal.      Left Ear: External ear normal.      Nose: Nose normal.      Mouth/Throat:      Mouth: Mucous membranes are moist. Mucous membranes are not dry. Eyes:      General: No scleral icterus. Conjunctiva/sclera: Conjunctivae normal.   Neck:      Vascular: No JVD. Cardiovascular:      Rate and Rhythm: Normal rate and regular rhythm. Heart sounds: S1 normal and S2 normal.   Pulmonary:      Effort: Pulmonary effort is ab-normal. . Needing NIPPV     Breath sounds: Rhonchi present. Abdominal:      General: Bowel sounds are normal. There is   distension. Musculoskeletal:         General: Swelling (mild) present. No deformity. Cervical back: Normal range of motion and neck supple. Skin:     General: Skin is dry. Coloration: Skin is not jaundiced. Neurological:      Mental Status: Drowsy, arouses able,  Psychiatric:      Unable to fully obtain    =======================================================================================     DATA:  Diagnostic tests reviewed by me for today's visit:   (AS NEEDED FOR MY EVALUATION AND MANAGEMENT).        Recent Labs     11/13/22  0413 11/14/22  0356 11/15/22  0540   WBC 13.2* 16.1* 17.5*   HCT 35.1* 35.1* 38.7*    197 165       Iron Saturation:  No components found for: PERCENTFE  FERRITIN:  No results found for: FERRITIN  IRON:  No results found for: IRON  TIBC:  No results found for: TIBC    Recent Labs     11/13/22  0413 11/14/22  0356 11/15/22  0540    136 141   K 5.0 5.2* 4.8   * 107 107   CO2 22 19* 22   BUN 41* 43* 48*   CREATININE 1.4* 1.2 1.3       Recent Labs     11/13/22  0413 11/14/22  0356 11/15/22  0540   CALCIUM 9.3 9.2 9.3   MG 2.80* 2.70* 2.50*   PHOS 2.9 2.3* 2.6       No results for input(s): PH, PCO2, PO2 in the last 72 hours.     Invalid input(s): Selam Franciscoing    ABG:  No results found for: PH, PCO2, PO2, HCO3, BE, THGB, TCO2, O2SAT  VBG:    Lab Results   Component Value Date/Time    PHVEN 7.396 11/10/2022 11:37 AM    MZV1LVS 24.7 11/10/2022 11:37 AM    BEVEN -8.1 11/10/2022 11:37 AM    G2EPGCGO 100 11/10/2022 11:37 AM       LDH:  No results found for: LDH  Uric Acid:    Lab Results   Component Value Date/Time    LABURIC 7.9 11/10/2022 05:59 AM       PT/INR:    Lab Results   Component Value Date/Time    PROTIME 12.6 06/25/2010 06:30 AM    INR 1.19 06/25/2010 06:30 AM     Warfarin PT/INR:  No components found for: PTPATWAR, PTINRWAR  PTT:    Lab Results   Component Value Date/Time    APTT 34.6 06/25/2010 06:30 AM   [APTT}  Last 3 Troponin:    Lab Results   Component Value Date/Time    TROPONINI 0.01 11/08/2022 02:57 PM       U/A:    Lab Results   Component Value Date/Time    NITRITE neg 02/01/2018 03:01 PM    COLORU Yellow 11/09/2022 02:30 PM    PROTEINU 100 11/09/2022 02:30 PM    PHUR 6.0 11/09/2022 02:30 PM    WBCUA 1 11/09/2022 02:30 PM    RBCUA 0-2 11/09/2022 02:30 PM    BACTERIA 4+ 11/09/2022 02:30 PM    CLARITYU CLOUDY 11/09/2022 02:30 PM    SPECGRAV 1.020 11/09/2022 02:30 PM    LEUKOCYTESUR SMALL 11/09/2022 02:30 PM    UROBILINOGEN 1.0 11/09/2022 02:30 PM    BILIRUBINUR Negative 11/09/2022 02:30 PM    BILIRUBINUR neg 02/01/2018 03:01 PM    BLOODU SMALL 11/09/2022 02:30 PM    GLUCOSEU Negative 11/09/2022 02:30 PM    AMORPHOUS Present 11/09/2022 02:30 PM     Microalbumen/Creatinine ratio:  No components found for: RUCREAT  24 Hour Urine for Protein:  No components found for: RAWUPRO, UHRS3, LMTS17BT, UTV3  24 Hour Urine for Creatinine Clearance:  No components found for: CREAT4, UHRS10, UTV10  Urine Toxicology:  No components found for: IAMMENTA, IBARBIT, IBENZO, ICOCAINE, IMARTHC, IOPIATES, IPHENCYC    HgBA1c:  No results found for: LABA1C  RPR:  No results found for: RPR  HIV:  No results found for: HIV  [place]:  No results found for: ANATITER, [place]  RF:  No results found for: RF  DSDNA:  No components found for: DNA  AMYLASE:  No results found for: AMYLASE  LIPASE:  No results found for: LIPASE  Fibrinogen Level:  No components found for: FIB       BELOW MENTIONED RADIOLOGY STUDY RESULTS BY ME (AS NEEDED FOR MY EVALUATION AND MANAGEMENT). CT CHEST WO CONTRAST    Result Date: 11/8/2022  Consolidation in the mid to lower lungs is compatible with multifocal pneumonia. Suspected right hilar lymphadenopathy is likely reactive. Suggest follow-up contrast-enhanced chest CT within 3 months to assess for resolution. This report was transcribed using voice recognition software, mainly. So please excuse brevity and/or typos. Every effort was made to ensure accuracy, however, inadvertent computerized transcription errors may be present. Please contact us, if any questions or clarifications are needed.

## 2022-11-15 NOTE — PROGRESS NOTES
Hospitalist Progress Note      PCP: Evans Bagley, APRN - CNP    Date of Admission: 11/8/2022    Chief Complaint:  SOB    Hospital Course: admitted with COVID pneumonia. On HHFNC    Subjective:  sleepy. Family at bed side. Pt coughing at times. temp 100. 1      Medications:  Reviewed    Infusion Medications    sodium chloride 5 mL/hr at 11/13/22 0400     Scheduled Medications    furosemide  20 mg IntraVENous BID    enoxaparin  1 mg/kg SubCUTAneous BID    cefepime  2,000 mg IntraVENous Q12H    dexamethasone  20 mg IntraVENous Q24H    albuterol sulfate HFA  2 puff Inhalation BID    ipratropium  2 puff Inhalation BID    allopurinol  100 mg Oral Daily    donepezil  10 mg Oral Nightly    ibrutinib  420 mg Oral Nightly    levothyroxine  100 mcg Oral Daily    primidone  50 mg Oral Daily    propranolol  20 mg Oral Daily    sodium chloride flush  5-40 mL IntraVENous 2 times per day     PRN Meds: sodium chloride flush, sodium chloride, ondansetron **OR** ondansetron, polyethylene glycol, acetaminophen **OR** acetaminophen      Intake/Output Summary (Last 24 hours) at 11/15/2022 1329  Last data filed at 11/15/2022 7683  Gross per 24 hour   Intake 120 ml   Output 900 ml   Net -780 ml       Physical Exam Performed:    BP (!) 142/72   Pulse 85   Temp 100.1 °F (37.8 °C) (Temporal)   Resp 22   Ht 6' 2\" (1.88 m)   Wt 211 lb 10.3 oz (96 kg)   SpO2 93%   BMI 27.17 kg/m²     General appearance: lethargic; resting in bed   HEENT: Pupils equal, round, and reactive to light. Conjunctivae/corneas clear. Neck: Supple, with full range of motion. No jugular venous distention. Trachea midline. Respiratory:  b/l expiratory wheezing noted. Cardiovascular: Regular rate and rhythm with normal S1/S2 without murmurs, rubs or gallops. Abdomen: Soft, non-tender, non-distended with normal bowel sounds. Musculoskeletal: No clubbing, cyanosis or edema bilaterally. Full range of motion without deformity.   Skin: Skin color, texture, turgor normal.  No rashes or lesions. Neurologic:  Neurovascularly intact without any focal sensory/motor deficits. Cranial nerves: II-XII intact, grossly non-focal.  Capillary Refill: Brisk, 3 seconds, normal   Peripheral Pulses: +2 palpable, equal bilaterally       Labs:   Recent Labs     11/13/22 0413 11/14/22 0356 11/15/22  0540   WBC 13.2* 16.1* 17.5*   HGB 11.4* 11.5* 12.6*   HCT 35.1* 35.1* 38.7*    197 165     Recent Labs     11/13/22  0413 11/14/22  0356 11/15/22  0540    136 141   K 5.0 5.2* 4.8   * 107 107   CO2 22 19* 22   BUN 41* 43* 48*   CREATININE 1.4* 1.2 1.3   CALCIUM 9.3 9.2 9.3   PHOS 2.9 2.3* 2.6     Recent Labs     11/13/22  0413 11/14/22  0356 11/15/22  0540   AST 43* 41* 41*   ALT 60* 61* 59*   BILITOT 0.4 0.4 0.6   ALKPHOS 45 45 56     No results for input(s): INR in the last 72 hours. No results for input(s): Ramos Bigness in the last 72 hours. Urinalysis:      Lab Results   Component Value Date/Time    NITRU Negative 11/09/2022 02:30 PM    WBCUA 1 11/09/2022 02:30 PM    BACTERIA 4+ 11/09/2022 02:30 PM    RBCUA 0-2 11/09/2022 02:30 PM    BLOODU SMALL 11/09/2022 02:30 PM    SPECGRAV 1.020 11/09/2022 02:30 PM    GLUCOSEU Negative 11/09/2022 02:30 PM       Radiology:  XR CHEST PORTABLE   Final Result   Scattered bilateral airspace infiltrates consistent with multifocal pneumonia. XR CHEST PORTABLE   Final Result   No significant improvement. Multifocal bilateral infiltrates. Mild   cardiomegaly. CT CHEST WO CONTRAST   Final Result   Consolidation in the mid to lower lungs is compatible with multifocal   pneumonia. Suspected right hilar lymphadenopathy is likely reactive. Suggest follow-up contrast-enhanced chest CT within 3 months to assess for   resolution.          XR CHEST PORTABLE   Final Result   Findings consistent with bilateral mid-basilar pneumonia worse on the right   and/or congestive heart failure.  3-3.5 cm mass in the peripheral right mid   lung field should also be considered. Chest CT and/or follow-up to   resolution recommended. Assessment/Plan:    Active Hospital Problems    Diagnosis     Typical atrial flutter (HCC) [I48.3]      Priority: Medium    ARDS (adult respiratory distress syndrome) ([address] [J80]      Priority: Medium    ROSIE (acute kidney injury) ([address] [N17.9]      Priority: Medium    CLL (chronic lymphocytic leukemia) (HCC) [C91.10]      Priority: Medium    Sepsis with acute hypoxic respiratory failure without septic shock ([address] [A41.9, R65.20, J96.01]      Priority: Medium    Acute respiratory failure with hypoxia ([address] [J96.01]      Priority: Medium    Atrial flutter by electrocardiogram ([address] [I48.92]      Priority: Medium    History of chronic lymphocytic leukemia [Z85.6]      Priority: Medium    COVID-19 [U07.1]      Priority: Medium    Community acquired pneumonia [J18.9]      Priority: Medium    Septicemia ([address] [A41.9]      Priority: Medium    Primary hypertension [I10]      COVID-19 pneumonia: On Decadron ARDS dosing. Continue nebs as needed. On Lovenox twice daily. Procalcitonin not elevated. Acute hypoxic aspiratory failure: Secondary to COVID-19 pneumonia. On heated high flow nasal cannula. Continue to wean as tolerated. CLL: Ibrutinib on hold. Hematology on board    Atrial flutter: EP on board. Rate controlled. Currently on Lovenox twice daily. CKD stage III: Creatinine at baseline currently. Nephrology on board. On IV Lasix. Hypertension    Dementia: On Aricept    Hypothyroidism: Synthroid    Tremors: Continue primidone and propranolol    Palliative care on board. Limited code. DVT Prophylaxis: Lovenox  Diet: ADULT DIET;  Regular  ADULT ORAL NUTRITION SUPPLEMENT; Lunch, Dinner; Standard High Calorie/High Protein Oral Supplement  Code Status: Limited  PT/OT Eval Status: Not at this time    Dispo -continue ICU care    Yaya Lee MD

## 2022-11-15 NOTE — PROGRESS NOTES
Patient is alert and oriented x 3 (disoriented to time) but cooperative with assessments. Trying to get out of bed and states \"I need to go to the bathroom\". Vitals signs within defined limits. Vitals signs within defined limits. Remains on AirVo. Patient has a persistent cough. Wheezing upon lung auscultation. Bowel sounds are active. Patient dose mention loss of appetite but is able to drink water. Incontinence care done. Patient repositioned. Bed in low position, bed alarm on, call light within reach. Will continue to monitor.

## 2022-11-15 NOTE — CARE COORDINATION
Referral given to [facility], [facility] at [address] [place] [address] ( [phone]) she will review today.        UPDATE: [facility] has approved patient for admission        Electronically signed by Jerri Martinez RN on 11/15/2022 at 3:08 PM

## 2022-11-15 NOTE — PROGRESS NOTES
[facility] Pulmonary/CCM Progress note      Admit Date: 11/8/2022    Chief Complaint: Respiratory distress    Subjective: Interval History: Patient is somewhat agitated today, still requiring 35 L / 75% via air Vo. Has cough with mucoid phlegm. Patient's daughter is at bedside.     Scheduled Meds:   furosemide  20 mg IntraVENous BID    enoxaparin  1 mg/kg SubCUTAneous BID    cefepime  2,000 mg IntraVENous Q12H    dexamethasone  20 mg IntraVENous Q24H    albuterol sulfate HFA  2 puff Inhalation BID    ipratropium  2 puff Inhalation BID    allopurinol  100 mg Oral Daily    donepezil  10 mg Oral Nightly    [Held by provider] ibrutinib  420 mg Oral Nightly    levothyroxine  100 mcg Oral Daily    primidone  50 mg Oral Daily    propranolol  20 mg Oral Daily    sodium chloride flush  5-40 mL IntraVENous 2 times per day     Continuous Infusions:   sodium chloride 5 mL/hr at 11/13/22 0400     PRN Meds:sodium chloride flush, sodium chloride, ondansetron **OR** ondansetron, polyethylene glycol, acetaminophen **OR** acetaminophen    Review of Systems  Constitutional: Fatigue and malaise  Ears, nose, mouth, throat: negative for ear drainage, epistaxis, hoarseness, nasal congestion, sore throat and voice change  Respiratory: negative except for cough and shortness of breath  Cardiovascular: negative for chest pain, chest pressure/discomfort, irregular heart beat, lower extremity edema and palpitations  Gastrointestinal: negative for abdominal pain, constipation, diarrhea, jaundice, melena, odynophagia, reflux symptoms and vomiting  Hematologic/lymphatic: negative for bleeding, easy bruising, lymphadenopathy and petechiae  Musculoskeletal:negative for arthralgias, bone pain, muscle weakness, neck pain and stiff joints  Neurological: negative for dizziness, gait problems, headaches, seizures, speech problems, tremors and weakness  Behavioral/Psych: negative for anxiety, behavior problems, depression, fatigue and sleep disturbance  Endocrine: negative for diabetic symptoms including none, neuropathy, polyphagia, polyuria, polydipsia, vomiting and diarrhea and temperature intolerance  Allergic/Immunologic: negative for anaphylaxis, angioedema, hay fever and urticaria    Objective:     Patient Vitals for the past 8 hrs:   BP Temp Temp src Pulse Resp SpO2   11/15/22 1317 -- -- -- -- 22 --   11/15/22 1158 (!) 142/72 100.1 °F (37.8 °C) Temporal 85 24 93 %   11/15/22 0907 (!) 140/99 98.2 °F (36.8 °C) Temporal 89 25 96 %       I/O last 3 completed shifts: In: 130 [P.O.:120; I.V.:10]  Out: 900 [Urine:900]  No intake/output data recorded.     General Appearance: alert and oriented to person, place and time, well developed and well- nourished, in no acute distress  Skin: warm and dry, no rash or erythema  Head: normocephalic and atraumatic  Eyes: pupils equal, round, and reactive to light, extraocular eye movements intact, conjunctivae normal  ENT: external ear and ear canal normal bilaterally, nose without deformity, nasal mucosa and turbinates normal  Neck: supple and non-tender without mass, no cervical lymphadenopathy  Pulmonary/Chest: rales present-bibasal  Cardiovascular: normal rate, regular rhythm,  no murmurs, rubs, distal pulses intact, no carotid bruits  Abdomen: soft, non-tender, non-distended, normal bowel sounds, no masses or organomegaly  Lymph Nodes: Cervical, supraclavicular normal  Extremities: no cyanosis, clubbing or edema  Musculoskeletal: normal range of motion, no joint swelling, deformity or tenderness  Neurologic: alert, no focal neurologic deficits    Data Review:  CBC:   Lab Results   Component Value Date/Time    WBC 17.5 11/15/2022 05:40 AM    RBC 4.63 11/15/2022 05:40 AM     BMP:   Lab Results   Component Value Date/Time    GLUCOSE 108 11/15/2022 05:40 AM    CO2 22 11/15/2022 05:40 AM    BUN 48 11/15/2022 05:40 AM    CREATININE 1.3 11/15/2022 05:40 AM    CALCIUM 9.3 11/15/2022 05:40 AM     ABG: No results found for: [id number], BEART, [id number], PHART, THGBART, KPV6IQB, PO2ART, [place]    Radiology: All pertinent images / reports were reviewed as a part of this visit. Narrative   EXAMINATION:   ONE XRAY VIEW OF THE CHEST       11/14/2022 10:15 am       COMPARISON:   None. HISTORY:   ORDERING SYSTEM PROVIDED HISTORY: check for infiltrates   TECHNOLOGIST PROVIDED HISTORY:   Reason for exam:->check for infiltrates   Reason for Exam: Check for infiltrates       FINDINGS:   Scattered bilateral airspace infiltrates. No pneumothorax or pleural   effusion. Mild cardiomegaly. Impression   Scattered bilateral airspace infiltrates consistent with multifocal pneumonia. Problem List:     Acute hypoxic respiratory failure  Multifocal pneumonia related to COVID-19 infection  Acute on chronic CKD  CLL    Assessment/Plan:     Acute hypoxic respiratory failure related to severe COVID-19 infection and ARDS. Still requiring heated high flow nasal cannula, no meaningful improvement noted since hospitalization. Significant O2 desaturations noted even with minimal activity. Continue Decadron 20 mg IV daily. X-ray shows mild improvement in bilateral infiltrates. Procalcitonin elevated-completing course of empiric cefepime. > 73.5    Leukocytosis, WBC 17.5, most likely related to CLL. Might also be related to steroid use. Monitor only. ROSIE, improved-creatinine 1.3.   mL    Patient is already receiving therapeutic Lovenox for history of atrial flutter. Critical care team will follow. Patient remains limited code, DNR/DNI-patient/family not ready for comfort care measures. Overall prognosis is guarded especially in view of persistent hypoxia from COVID-19 infection, advanced age and history of CLL.     Javi Garber MD

## 2022-11-15 NOTE — PROGRESS NOTES
[facility]   Progress Note      11/15/2022 1:37 PM        Name: Barbara Patel . Admitted: 11/8/2022    SUBJECTIVE:  Events noted. Patient in ICU. Resting in bed. Requiring heated jenna flow oxygen.      Reviewed interval ancillary notes    Current Medications  furosemide (LASIX) injection 20 mg, BID  enoxaparin (LOVENOX) injection 100 mg, BID  cefepime (MAXIPIME) 2000 mg IVPB minibag, Q12H  dexamethasone (DECADRON) 20 mg in sodium chloride 0.9 % 50 mL IVPB, Q24H  albuterol sulfate HFA (PROVENTIL;VENTOLIN;PROAIR) 108 (90 Base) MCG/ACT inhaler 2 puff, BID  ipratropium (ATROVENT HFA) 17 MCG/ACT inhaler 2 puff, BID  allopurinol (ZYLOPRIM) tablet 100 mg, Daily  donepezil (ARICEPT) tablet 10 mg, Nightly  [Held by provider] ibrutinib (IMBRUVICA) chemo tablet 420 mg PATIENT SUPPLIED (Patient Supplied), Nightly  levothyroxine (SYNTHROID) tablet 100 mcg, Daily  primidone (MYSOLINE) tablet 50 mg, Daily  propranolol (INDERAL) tablet 20 mg, Daily  sodium chloride flush 0.9 % injection 5-40 mL, 2 times per day  sodium chloride flush 0.9 % injection 5-40 mL, PRN  0.9 % sodium chloride infusion, PRN  ondansetron (ZOFRAN-ODT) disintegrating tablet 4 mg, Q8H PRN   Or  ondansetron (ZOFRAN) injection 4 mg, Q6H PRN  polyethylene glycol (GLYCOLAX) packet 17 g, Daily PRN  acetaminophen (TYLENOL) tablet 650 mg, Q6H PRN   Or  acetaminophen (TYLENOL) suppository 650 mg, Q6H PRN        Objective:  BP (!) 142/72   Pulse 85   Temp 100.1 °F (37.8 °C) (Temporal)   Resp 22   Ht 6' 2\" (1.88 m)   Wt 211 lb 10.3 oz (96 kg)   SpO2 93%   BMI 27.17 kg/m²     Intake/Output Summary (Last 24 hours) at 11/15/2022 1337  Last data filed at 11/15/2022 4609  Gross per 24 hour   Intake 120 ml   Output 900 ml   Net -780 ml      Wt Readings from Last 3 Encounters:   11/15/22 211 lb 10.3 oz (96 kg)   09/02/22 234 lb 6.4 oz (106.3 kg)   06/02/22 240 lb (108.9 kg)       General appearance:  Appears comfortable  Eyes: Sclera clear. Pupils equal.  ENT: Moist oral mucosa. Trachea midline, no adenopathy. Cardiovascular: Regular rhythm, normal S1, S2. No murmur. No edema in lower extremities  Respiratory: Not using accessory muscles. Good inspiratory effort. + Wheezing. GI: Abdomen soft, no tenderness, not distended  Musculoskeletal: No cyanosis in digits, neck supple  Neurology: CN 2-12 grossly intact. No speech or motor deficits  Psych: Normal affect. Alert and oriented in time, place and person  Skin: Warm, dry, normal turgor    Labs and Tests:  CBC:   Recent Labs     11/13/22  0413 11/14/22  0356 11/15/22  0540   WBC 13.2* 16.1* 17.5*   HGB 11.4* 11.5* 12.6*    197 165     BMP:    Recent Labs     11/13/22  0413 11/14/22  0356 11/15/22  0540    136 141   K 5.0 5.2* 4.8   * 107 107   CO2 22 19* 22   BUN 41* 43* 48*   CREATININE 1.4* 1.2 1.3   GLUCOSE 145* 120* 108*     Hepatic:   Recent Labs     11/13/22  0413 11/14/22  0356 11/15/22  0540   AST 43* 41* 41*   ALT 60* 61* 59*   BILITOT 0.4 0.4 0.6   ALKPHOS 45 45 64       ASSESSMENT AND PLAN    Principal Problem:    Septicemia ([facility] [address]  Active Problems:    Acute respiratory failure with hypoxia (HCC)    Atrial flutter by electrocardiogram (HCC)    History of chronic lymphocytic leukemia    COVID-19    Community acquired pneumonia    ROSIE (acute kidney injury) ([facility] [address]    CLL (chronic lymphocytic leukemia) (HCC)    Sepsis with acute hypoxic respiratory failure without septic shock (HCC)    Typical atrial flutter (HCC)    ARDS (adult respiratory distress syndrome) ([facility] [address]    Primary hypertension  Resolved Problems:    * No resolved hospital problems. *         70-year-old gentleman who was admitted in the hospital with COVID pneumonia has     1. Chronic lymphocytic leukemia:     -Diagnosed in February 2021  -Has been on ibrutinib for 420 mg p.o. daily. Will hold ibrutinib due to new onset a-fib/a-flutter. Resume at discharge.   -White count at baseline - 15-17K     2.   Acute hypoxic respiratory failure due to COVID-19 pneumonia:     -Management per primary/ICU team     3. atrial flutter:    -Management per cardiology. 4. ROSIE on CKD 3:    -Nephrology following.  -Now resolved. Sukumaranderson ParehkFrancisco sosa.  [phone]      Known patient of CLL stable on ibrutinib admitted for COVID pneumonia. Given atrial flutter we will hold ibrutinib.       Danii Burgos MD  [facility]

## 2022-11-16 PROBLEM — E44.0 MODERATE MALNUTRITION (HCC): Chronic | Status: ACTIVE | Noted: 2022-01-01

## 2022-11-16 NOTE — PROGRESS NOTES
[facility] Pulmonary/CCM Progress note      Admit Date: 11/8/2022    Chief Complaint: Respiratory distress    Subjective: Interval History: Tristan Sherman much the same, patient had numerous questions which were answered to the best capability. O2 requirements are fluctuating but overall remains high, currently 80% / 60 L. O2 desaturation with any form of activity. Cough appears to have improved and patient does not have significant chest congestion today.     Scheduled Meds:   enoxaparin  1 mg/kg SubCUTAneous BID    dexamethasone  20 mg IntraVENous Q24H    albuterol sulfate HFA  2 puff Inhalation BID    ipratropium  2 puff Inhalation BID    allopurinol  100 mg Oral Daily    donepezil  10 mg Oral Nightly    [Held by provider] ibrutinib  420 mg Oral Nightly    levothyroxine  100 mcg Oral Daily    primidone  50 mg Oral Daily    propranolol  20 mg Oral Daily    sodium chloride flush  5-40 mL IntraVENous 2 times per day     Continuous Infusions:   sodium chloride Stopped (11/14/22 1015)     PRN Meds:sodium chloride flush, sodium chloride, ondansetron **OR** ondansetron, polyethylene glycol, acetaminophen **OR** acetaminophen    Review of Systems  Constitutional: Fatigue and malaise  Ears, nose, mouth, throat: negative for ear drainage, epistaxis, hoarseness, nasal congestion, sore throat and voice change  Respiratory: negative except for cough and shortness of breath  Cardiovascular: negative for chest pain, chest pressure/discomfort, irregular heart beat, lower extremity edema and palpitations  Gastrointestinal: negative for abdominal pain, constipation, diarrhea, jaundice, melena, odynophagia, reflux symptoms and vomiting  Hematologic/lymphatic: negative for bleeding, easy bruising, lymphadenopathy and petechiae  Musculoskeletal:negative for arthralgias, bone pain, muscle weakness, neck pain and stiff joints  Neurological: negative for dizziness, gait problems, headaches, seizures, speech problems, tremors and weakness  Behavioral/Psych: negative for anxiety, behavior problems, depression, fatigue and sleep disturbance  Endocrine: negative for diabetic symptoms including none, neuropathy, polyphagia, polyuria, polydipsia, vomiting and diarrhea and temperature intolerance  Allergic/Immunologic: negative for anaphylaxis, angioedema, hay fever and urticaria    Objective:     Patient Vitals for the past 8 hrs:   BP Temp Temp src Pulse Resp SpO2 Height   11/16/22 1550 -- -- -- -- -- 93 % --   11/16/22 1500 (!) 110/96 -- -- 83 17 -- --   11/16/22 1400 120/71 -- -- 83 20 97 % --   11/16/22 1300 117/80 -- -- 80 15 92 % --   11/16/22 1258 -- -- -- -- 20 93 % --   11/16/22 1200 130/71 -- -- 76 19 98 % --   11/16/22 1100 118/65 -- -- 69 21 93 % --   11/16/22 1000 121/66 -- -- 86 21 95 % --   11/16/22 0908 -- -- -- -- -- -- 6' 2\" (1.88 m)   11/16/22 0900 121/62 97.7 °F (36.5 °C) Temporal 85 25 -- --       I/O last 3 completed shifts: In: 765.6 [P.O.:240; I.V.:88.3; IV Piggyback:437.3]  Out: 1625 [Urine:1625]  I/O this shift:   In: 48 [IV Piggyback:50]  Out: -     General Appearance: alert, moderate respiratory distress  Skin: warm and dry, no rash or erythema  Head: normocephalic and atraumatic  Eyes: pupils equal, round, and reactive to light, extraocular eye movements intact, conjunctivae normal  ENT: external ear and ear canal normal bilaterally, nose without deformity, nasal mucosa and turbinates normal  Neck: supple and non-tender without mass, no cervical lymphadenopathy  Pulmonary/Chest: Good air entry, clear with no wheezes or crackles noted today  Cardiovascular: normal rate, regular rhythm,  no murmurs, rubs, distal pulses intact, no carotid bruits  Abdomen: soft, non-tender, non-distended, normal bowel sounds, no masses or organomegaly  Lymph Nodes: Cervical, supraclavicular normal  Extremities: no cyanosis, clubbing or edema  Musculoskeletal: normal range of motion, no joint swelling, deformity or tenderness  Neurologic: alert, no focal neurologic deficits    Data Review:  CBC:   Lab Results   Component Value Date/Time    WBC 19.0 11/16/2022 05:19 AM    RBC 4.71 11/16/2022 05:19 AM     BMP:   Lab Results   Component Value Date/Time    GLUCOSE 154 11/16/2022 05:19 AM    CO2 22 11/16/2022 05:19 AM    BUN 66 11/16/2022 05:19 AM    CREATININE 1.6 11/16/2022 05:19 AM    CALCIUM 9.5 11/16/2022 05:19 AM     ABG: No results found for: [id number], BEART, [id number], PHART, THGBART, BNZ3GWW, PO2ART, VFW1AIW    Radiology: All pertinent images / reports were reviewed as a part of this visit. Narrative   EXAMINATION:   ONE XRAY VIEW OF THE CHEST       11/14/2022 10:15 am       COMPARISON:   None. HISTORY:   ORDERING SYSTEM PROVIDED HISTORY: check for infiltrates   TECHNOLOGIST PROVIDED HISTORY:   Reason for exam:->check for infiltrates   Reason for Exam: Check for infiltrates       FINDINGS:   Scattered bilateral airspace infiltrates. No pneumothorax or pleural   effusion. Mild cardiomegaly. Impression   Scattered bilateral airspace infiltrates consistent with multifocal pneumonia. Problem List:     Acute hypoxic respiratory failure  Multifocal pneumonia related to COVID-19 infection  Acute on chronic CKD  CLL    Assessment/Plan:     Acute hypoxic respiratory failure related to severe COVID-19 infection and ARDS. Overall respiratory status has not clearly improved since hospitalization. Significant O2 desaturations noted even with minimal activity. Continue Decadron 20 mg IV daily. We will perform repeat chest x-ray tomorrow a.m-multifocal infiltrates noted consistent with COVID-19 infection. > 73.5, improvement noted-repeat tomorrow. CT would be optional, patient is well into his second week of the illness and could be developing pulmonary fibrosis. Daughter refused this recommendation, due to concerns for high O2 needs and patient's inability to lie supine.   We all agree however, that the overall management would not change    Leukocytosis, WBC 19, most likely related to CLL. Might also be related to steroid use. Monitor only. Completed course of empiric cefepime-procalcitonin is normal.    ROSIE, creatinine is 1.6. Patient has history of stage III CKD. Overall creatinine appears to be somewhat stable, could consider slow IV fluids if oral intake/hydration status becomes an issue. Patient is already receiving therapeutic Lovenox for history of atrial flutter. Critical care team will follow. Patient remains limited code, DNR/DNI-daughter wants to continue with the current care as it is for few more days.     Medina Brown MD

## 2022-11-16 NOTE — PROGRESS NOTES
Palliative Care:     Spoke with pt and pt's daughter, Noe Bonds, at bedside. Reinforced pt's current status, POC, and prognosis. Discussed MD rec for CT scan and Camille's decline of scan d/t pt's severe desat when laying flat. Discussed pt's status yesterday vs today and team's hope that pt will continue to improve and oxygen demand will decrease. Discussed that, in that case, pt may qualify for therapy / SNF or LTAC at discharge. Also discussed that pt's status may be d/t pulm fibrosis r/t covid and the possibility that pt status / O2 demand may not improve.  Discussed potential for hospice in the future, but MD desire to give pt another day or two before going down

## 2022-11-16 NOTE — PROGRESS NOTES
Dr. Isabelle Tristan rounded on patient and spoke with daughter Max DÍAZ. It was decided to give patient a few more days and then evaluate whether it is optimal to send pt to a LTAC or hospice. Lemar Denver, RN palliative care RN met with daughter and patient.

## 2022-11-16 NOTE — RT PROTOCOL NOTE
RT Inhaler-Nebulizer Bronchodilator Protocol Note    There is a bronchodilator order in the chart from a provider indicating to follow the RT Bronchodilator Protocol and there is an Initiate RT Inhaler-Nebulizer Bronchodilator Protocol order as well (see protocol at bottom of note). CXR Findings:  No results found. The findings from the last RT Protocol Assessment were as follows:   History Pulmonary Disease: None or smoker <15 pack years  Respiratory Pattern: Regular pattern and RR 12-20 bpm  Breath Sounds: Slightly diminished and/or crackles  Cough: Strong, spontaneous, non-productive  Indication for Bronchodilator Therapy: Decreased or absent breath sounds  Bronchodilator Assessment Score: 2    Aerosolized bronchodilator medication orders have been revised according to the RT Inhaler-Nebulizer Bronchodilator Protocol below. Respiratory Therapist to perform RT Therapy Protocol Assessment initially then follow the protocol. Repeat RT Therapy Protocol Assessment PRN for score 0-3 or on second treatment, BID, and PRN for scores above 3. No Indications - adjust the frequency to every 6 hours PRN wheezing or bronchospasm, if no treatments needed after 48 hours then discontinue using Per Protocol order mode. If indication present, adjust the RT bronchodilator orders based on the Bronchodilator Assessment Score as indicated below. Use Inhaler orders unless patient has one or more of the following: on home nebulizer, not able to hold breath for 10 seconds, is not alert and oriented, cannot activate and use MDI correctly, or respiratory rate 25 breaths per minute or more, then use the equivalent nebulizer order(s) with same Frequency and PRN reasons based on the score. If a patient is on this medication at home then do not decrease Frequency below that used at home.     0-3 - enter or revise RT bronchodilator order(s) to equivalent RT Bronchodilator order with Frequency of every 4 hours PRN for wheezing or increased work of breathing using Per Protocol order mode. 4-6 - enter or revise RT Bronchodilator order(s) to two equivalent RT bronchodilator orders with one order with BID Frequency and one order with Frequency of every 4 hours PRN wheezing or increased work of breathing using Per Protocol order mode. 7-10 - enter or revise RT Bronchodilator order(s) to two equivalent RT bronchodilator orders with one order with TID Frequency and one order with Frequency of every 4 hours PRN wheezing or increased work of breathing using Per Protocol order mode. 11-13 - enter or revise RT Bronchodilator order(s) to one equivalent RT bronchodilator order with QID Frequency and an Albuterol order with Frequency of every 4 hours PRN wheezing or increased work of breathing using Per Protocol order mode. Greater than 13 - enter or revise RT Bronchodilator order(s) to one equivalent RT bronchodilator order with every 4 hours Frequency and an Albuterol order with Frequency of every 2 hours PRN wheezing or increased work of breathing using Per Protocol order mode. RT to enter RT Home Evaluation for COPD & MDI Assessment order using Per Protocol order mode.     Electronically signed by More Puente RCP on 11/16/2022 at 4:10 PM

## 2022-11-16 NOTE — PROGRESS NOTES
MD Vicki Adorno MD Assunta Perl, MD                  Office: [phone]                      Fax: [phone]             0 [facility]                   NEPHROLOGY INPATIENT PROGRESS NOTE:     PATIENT NAME: Jeancarlos Stearns  : 1934  MRN: [id number]      RECOMMENDATIONS:   -Hold Lasix today with worsening ROSIE, can use as needed  - pulmonary fup for worsening hypoxia, COVID-19 mx   - multifocal pneumonia pic on CT, no major fluid overload     -Stopped bicarb infusion  - COVID-19 mx as per pulmonary team  - trend serum uric acid  -f/up w/ hem-onc   - monitor CBC w/ h/o CLL-diagnosed in 2021, was on ibrutinib  - IV antibiotic: Vancomycin: trough goal <15, pharmacy team assisting.   - willis inserted. - at higher risk for decompensation, needing closer monitoring.     D/C plan from renal stand point: fup w/ hypoxia   Have D/W patient, his wife team hospitalist     IMPRESSION:       Admitted on:  2022  1:28 PM   For:  Septicemia ([address] [A41.9]  Abnormal CXR [R93.89]  Acute respiratory failure with hypoxia ([address] [J96.01]  Atrial flutter by electrocardiogram ([address] [I48.92]  Sepsis ([address] [A41.9]  History of chronic lymphocytic leukemia [Z85.6]  Community acquired pneumonia, unspecified laterality [J18.9]   SARS-CoV-2 (+)  Hypoxia on 3L NC     ROSIE (on non-proteinuric CKD: stage 3B):   - BL Scr- lowest recently 1.4-1.5, as off 22   ->  1.8 on admission  - Etiology of ROSIE - presumed pre-renal now    - other differentials: unlikely  GN / TI / TMA process  - UA : needs it  - Renal imaging: on : 3-2021 w IV dye CT - Tiny nonobstructing right renal stone      Associated problems:   - Volume status: mild hypo-volemic  : HTN : no need for tight control    : Na: hyponatremia - mild 130 on admission, no recent h/o hypoNa, so likely acute, so should be ok for acute reversal     - Azotemia: pre-renal   - Electrolytes: K: WNL  - Acid-Base: acidosis, non-AGMA 15 on admission, LA WNL- checked blood gas - likely [email or web address]    - Anemia: of chronic disease        Other major problems: Management per primary and other consulting teams. H/O CLL     - CT chest -   Consolidation in the mid to lower lungs is compatible with multifocal pneumonia. Suspected right hilar lymphadenopathy is likely reactive. Suggest follow-up contrast-enhanced chest CT within 3 months to assess for resolution. -- would need fup w/ PCP      Hospital Problems             Last Modified POA    * (Principal) Septicemia ([address] 11/12/2022 Yes    Acute respiratory failure with hypoxia ([address] 11/12/2022 Yes    Atrial flutter by electrocardiogram ([address] 11/12/2022 Yes    History of chronic lymphocytic leukemia 11/12/2022 Yes    COVID-19 11/14/2022 Yes    Community acquired pneumonia 11/12/2022 Yes    ROSIE (acute kidney injury) ([address] 11/14/2022 Yes    CLL (chronic lymphocytic leukemia) ([address] 11/13/2022 Yes    Sepsis with acute hypoxic respiratory failure without septic shock ([address] 11/13/2022 Yes    Typical atrial flutter ([address] 11/14/2022 Yes    ARDS (adult respiratory distress syndrome) ([address] 11/14/2022 Yes    Moderate malnutrition ([address] (Chronic) 11/16/2022 Yes    Primary hypertension 11/14/2022 Yes   : other supportive care :   - Check daily renal function panel with electrolytes-phosphorus  - Strict monitoring of I/Os, daily weight  - Renal feeds/diet  - Current medications reviewed. - Nephrotoxic medications have been discontinued. - Dose adjusted and appropriate. - Dose meds for eGFR <15 mL/min/1.73m2 during ROSIE    - Avoid heavy opioids due to renal failure - may use very low dose dilaudid / fentanyl with close monitoring of CNS and respiratory depression. Please refer to the orders. High Complexity. Multiple complex problems. Discussed with patient and treatment team-    Time spent > 30 (~35) minutes. Thank you for allowing me to participate in this patient's care.  Please do not hesitate to contact me anytime. We will follow along with you. Betsy Mccarty MD,  [facility] [address] [place]: [phone] or [address]  Fax: [phone]        =======================================================================================   =======================================================================================  Subjective / interval history: worsening hypoxia, needing more O2-slightly worsening, after Lasix,  Overall still worsening hypoxia requirement increasing,  Renal function stable overall. Given lasix   K better    Past medical, Surgical, Social, Family medical history reviewed by me. MEDICATIONS: reviewed by me. Medications Prior to Admission:  No current facility-administered medications on file prior to encounter. Current Outpatient Medications on File Prior to Encounter   Medication Sig Dispense Refill    amLODIPine (NORVASC) 10 MG tablet TAKE 1 TABLET BY MOUTH DAILY 90 tablet 3    torsemide (DEMADEX) 20 MG tablet TAKE 1 TABLET BY MOUTH DAILY 30 tablet 5    potassium chloride (KLOR-CON M) 10 MEQ extended release tablet TAKE 1 TABLET BY MOUTH DAILY 90 tablet 3    donepezil (ARICEPT) 10 MG tablet Take 1 tablet by mouth nightly 90 tablet 3    primidone (MYSOLINE) 50 MG tablet TAKE 1 TABLET BY MOUTH DAILY 90 tablet 3    levothyroxine (SYNTHROID) 100 MCG tablet Take 1 tablet by mouth Daily 90 tablet 3    propranolol (INDERAL) 20 MG tablet TAKE 1 TABLET BY MOUTH DAILY 90 tablet 3    allopurinol (ZYLOPRIM) 100 MG tablet Take 100 mg by mouth daily      ibrutinib (IMBRUVICA) 420 MG tablet Take 420 mg by mouth daily      Calcium Carb-Cholecalciferol (CALCIUM/VITAMIN D PO) Take by mouth 1200 mg qd      multivitamin (ANTIOXIDANT;PROSIGHT) TABS per tablet Take 1 tablet by mouth daily.            Current Facility-Administered Medications:     enoxaparin (LOVENOX) injection 100 mg, 1 mg/kg, SubCUTAneous, BID, Barbara Smith MD, 100 mg at 11/16/22 1003    dexamethasone (DECADRON) 20 mg in sodium chloride 0.9 % 50 mL IVPB, 20 mg, IntraVENous, Q24H, Adam Parrish MD, Stopped at 11/15/22 1247    albuterol sulfate HFA (PROVENTIL;VENTOLIN;PROAIR) 108 (90 Base) MCG/ACT inhaler 2 puff, 2 puff, Inhalation, BID, Fortino Knutson MD, 2 puff at 11/15/22 2050    ipratropium (ATROVENT HFA) 17 MCG/ACT inhaler 2 puff, 2 puff, Inhalation, BID, Fortino Knutson MD, 2 puff at 11/15/22 2050    allopurinol (ZYLOPRIM) tablet 100 mg, 100 mg, Oral, Daily, Devendra Jensen MD, 100 mg at 11/16/22 1003    donepezil (ARICEPT) tablet 10 mg, 10 mg, Oral, Nightly, Devendra Jensen MD, 10 mg at 11/15/22 2103    [Held by provider] ibrutinib (IMBRUVICA) chemo tablet 420 mg PATIENT SUPPLIED (Patient Supplied), 420 mg, Oral, Nightly, Devendra Jensen MD, 420 mg at 11/14/22 2035    levothyroxine (SYNTHROID) tablet 100 mcg, 100 mcg, Oral, Daily, Devendra Jensen MD, 100 mcg at 11/16/22 1003    primidone (MYSOLINE) tablet 50 mg, 50 mg, Oral, Daily, Devendra Jensen MD, 50 mg at 11/16/22 1003    propranolol (INDERAL) tablet 20 mg, 20 mg, Oral, Daily, Fortino Knutson MD, 20 mg at 11/16/22 1003    sodium chloride flush 0.9 % injection 5-40 mL, 5-40 mL, IntraVENous, 2 times per day, Fortino Knutson MD, 10 mL at 11/16/22 1009    sodium chloride flush 0.9 % injection 5-40 mL, 5-40 mL, IntraVENous, PRN, Devendra Jensen MD    0.9 % sodium chloride infusion, , IntraVENous, PRN, Fortino Knutson MD, Stopped at 11/14/22 1015    ondansetron (ZOFRAN-ODT) disintegrating tablet 4 mg, 4 mg, Oral, Q8H PRN **OR** ondansetron (ZOFRAN) injection 4 mg, 4 mg, IntraVENous, Q6H PRN, Devendra Jensen MD    polyethylene glycol (GLYCOLAX) packet 17 g, 17 g, Oral, Daily PRN, Fortino Knutson MD    acetaminophen (TYLENOL) tablet 650 mg, 650 mg, Oral, Q6H PRN, 650 mg at 11/15/22 1204 **OR** acetaminophen (TYLENOL) suppository 650 mg, 650 mg, Rectal, Q6H PRN, Fortino Knutson MD         =======================================================================================     PHYSICAL EXAM:  Recent vital signs and recent I/Os reviewed by me. Wt Readings from Last 3 Encounters:   11/16/22 203 lb 7.8 oz (92.3 kg)   09/02/22 234 lb 6.4 oz (106.3 kg)   06/02/22 240 lb (108.9 kg)     BP Readings from Last 3 Encounters:   11/16/22 121/66   09/02/22 124/70   06/30/22 124/63     Patient Vitals for the past 24 hrs:   BP Temp Temp src Pulse Resp SpO2 Height Weight   11/16/22 1000 121/66 -- -- 86 21 95 % -- --   11/16/22 0908 -- -- -- -- -- -- 6' 2\" (1.88 m) --   11/16/22 0900 121/62 97.7 °F (36.5 °C) Temporal 85 25 -- -- --   11/16/22 0800 125/73 -- -- 85 22 97 % -- --   11/16/22 0700 131/74 -- -- 83 20 95 % -- --   11/16/22 0400 114/76 97.2 °F (36.2 °C) Temporal 85 17 96 % -- 203 lb 7.8 oz (92.3 kg)   11/16/22 0350 -- -- -- 85 24 (!) 88 % -- --   11/16/22 0002 126/67 -- -- 81 24 97 % -- --   11/16/22 0000 126/67 (!) 96.2 °F (35.7 °C) Temporal 81 24 94 % -- --   11/15/22 2052 -- -- -- 81 18 91 % -- --   11/15/22 2000 99/74 97.2 °F (36.2 °C) Temporal 83 23 94 % -- --   11/15/22 1957 99/74 -- -- 83 23 94 % -- --   11/15/22 1637 129/75 (!) 96.5 °F (35.8 °C) Temporal 80 25 91 % -- --   11/15/22 1317 -- -- -- -- 22 -- -- --   11/15/22 1158 (!) 142/72 100.1 °F (37.8 °C) Temporal 85 24 93 % -- --         Intake/Output Summary (Last 24 hours) at 11/16/2022 1054  Last data filed at 11/16/2022 0435  Gross per 24 hour   Intake 765.58 ml   Output 975 ml   Net -209.42 ml           Physical Exam  Vitals reviewed. Constitutional:       General: He is   in acute distress. Appearance: Normal appearance. He is ill-appearing. HENT:      Head: Normocephalic and atraumatic. Right Ear: External ear normal.      Left Ear: External ear normal.      Nose: Nose normal.      Mouth/Throat:      Mouth: Mucous membranes are moist. Mucous membranes are not dry. Eyes:      General: No scleral icterus. Conjunctiva/sclera: Conjunctivae normal.   Neck:      Vascular: No JVD.    Cardiovascular:      Rate and Rhythm: Normal rate and regular rhythm. Heart sounds: S1 normal and S2 normal.   Pulmonary:      Effort: Pulmonary effort is ab-normal. . Needing NIPPV     Breath sounds: Rhonchi present. Abdominal:      General: Bowel sounds are normal. There is   distension. Musculoskeletal:         General: Swelling (mild) present. No deformity. Cervical back: Normal range of motion and neck supple. Skin:     General: Skin is dry. Coloration: Skin is not jaundiced. Neurological:      Mental Status: Drowsy, arouses able,  Psychiatric:      Unable to fully obtain    =======================================================================================     DATA:  Diagnostic tests reviewed by me for today's visit:   (AS NEEDED FOR MY EVALUATION AND MANAGEMENT). Recent Labs     11/14/22  0356 11/15/22  0540 11/16/22  0519   WBC 16.1* 17.5* 19.0*   HCT 35.1* 38.7* 39.5*    165 150       Iron Saturation:  No components found for: PERCENTFE  FERRITIN:  No results found for: FERRITIN  IRON:  No results found for: IRON  TIBC:  No results found for: TIBC    Recent Labs     11/14/22  0356 11/15/22  0540 11/16/22  0519    141 140   K 5.2* 4.8 4.8    107 107   CO2 19* 22 22   BUN 43* 48* 66*   CREATININE 1.2 1.3 1.6*       Recent Labs     11/14/22  0356 11/15/22  0540 11/16/22  0519   CALCIUM 9.2 9.3 9.5   MG 2.70* 2.50* 2.80*   PHOS 2.3* 2.6  --        No results for input(s): PH, PCO2, PO2 in the last 72 hours.     Invalid input(s): [place]    ABG:  No results found for: PH, PCO2, PO2, HCO3, BE, THGB, TCO2, O2SAT  VBG:    Lab Results   Component Value Date/Time    PHVEN 7.396 11/10/2022 11:37 AM    QFZ4CBT 24.7 11/10/2022 11:37 AM    BEVEN -8.1 11/10/2022 11:37 AM    G7TZGOEZ 100 11/10/2022 11:37 AM       LDH:  No results found for: LDH  Uric Acid:    Lab Results   Component Value Date/Time    LABURIC 7.9 11/10/2022 05:59 AM       PT/INR:    Lab Results   Component Value Date/Time PROTIME 12.6 06/25/2010 06:30 AM    INR 1.19 06/25/2010 06:30 AM     Warfarin PT/INR:  No components found for: PTPATWAR, PTINRWAR  PTT:    Lab Results   Component Value Date/Time    APTT 34.6 06/25/2010 06:30 AM   [APTT}  Last 3 Troponin:    Lab Results   Component Value Date/Time    TROPONINI 0.01 11/08/2022 02:57 PM       U/A:    Lab Results   Component Value Date/Time    NITRITE neg 02/01/2018 03:01 PM    COLORU Yellow 11/09/2022 02:30 PM    PROTEINU 100 11/09/2022 02:30 PM    PHUR 6.0 11/09/2022 02:30 PM    WBCUA 1 11/09/2022 02:30 PM    RBCUA 0-2 11/09/2022 02:30 PM    BACTERIA 4+ 11/09/2022 02:30 PM    CLARITYU CLOUDY 11/09/2022 02:30 PM    SPECGRAV 1.020 11/09/2022 02:30 PM    LEUKOCYTESUR SMALL 11/09/2022 02:30 PM    UROBILINOGEN 1.0 11/09/2022 02:30 PM    BILIRUBINUR Negative 11/09/2022 02:30 PM    BILIRUBINUR neg 02/01/2018 03:01 PM    BLOODU SMALL 11/09/2022 02:30 PM    GLUCOSEU Negative 11/09/2022 02:30 PM    AMORPHOUS Present 11/09/2022 02:30 PM     Microalbumen/Creatinine ratio:  No components found for: RUCREAT  24 Hour Urine for Protein:  No components found for: RAWUPRO, UHRS3, WLFZ44JG, UTV3  24 Hour Urine for Creatinine Clearance:  No components found for: CREAT4, UHRS10, UTV10  Urine Toxicology:  No components found for: IAMMENTA, IBARBIT, IBENZO, ICOCAINE, IMARTHC, IOPIATES, IPHENCYC    HgBA1c:  No results found for: LABA1C  RPR:  No results found for: RPR  HIV:  No results found for: HIV  [place]:  No results found for: ANATITER, [place]  RF:  No results found for: RF  DSDNA:  No components found for: DNA  AMYLASE:  No results found for: AMYLASE  LIPASE:  No results found for: LIPASE  Fibrinogen Level:  No components found for: FIB       BELOW MENTIONED RADIOLOGY STUDY RESULTS BY ME (AS NEEDED FOR MY EVALUATION AND MANAGEMENT). CT CHEST WO CONTRAST    Result Date: 11/8/2022  Consolidation in the mid to lower lungs is compatible with multifocal pneumonia.   Suspected right hilar lymphadenopathy is

## 2022-11-16 NOTE — PLAN OF CARE
Problem: Discharge Planning  Goal: Discharge to home or other facility with appropriate resources  11/16/2022 1057 by Luzmaria Price RN  Outcome: Progressing  11/16/2022 0046 by Aamir Evans RN  Outcome: Progressing     Problem: Pain  Goal: Verbalizes/displays adequate comfort level or baseline comfort level  11/16/2022 1057 by Luzmaria Price RN  Outcome: Progressing  11/16/2022 0046 by Aamir Evans RN  Outcome: Progressing     Problem: Skin/Tissue Integrity  Goal: Absence of new skin breakdown  Description: 1. Monitor for areas of redness and/or skin breakdown  2. Assess vascular access sites hourly  3. Every 4-6 hours minimum:  Change oxygen saturation probe site  4. Every 4-6 hours:  If on nasal continuous positive airway pressure, respiratory therapy assess nares and determine need for appliance change or resting period.   11/16/2022 1057 by Luzmaria Price RN  Outcome: Progressing  11/16/2022 0046 by Aamir Evans RN  Outcome: Progressing     Problem: ABCDS Injury Assessment  Goal: Absence of physical injury  11/16/2022 1057 by Luzmaria Price RN  Outcome: Progressing  11/16/2022 0046 by Aamir Evans RN  Outcome: Progressing     Problem: Safety - Adult  Goal: Free from fall injury  11/16/2022 1057 by Luzmaria Price RN  Outcome: Progressing  11/16/2022 0046 by Aamir Evans RN  Outcome: Progressing     Problem: Respiratory - Adult  Goal: Achieves optimal ventilation and oxygenation  11/16/2022 1057 by Luzmaria Price RN  Outcome: Progressing  11/16/2022 0046 by Aamir Evans RN  Outcome: Progressing     Problem: Cardiovascular - Adult  Goal: Maintains optimal cardiac output and hemodynamic stability  11/16/2022 1057 by Luzmaria Price RN  Outcome: Progressing  11/16/2022 0046 by Aamir Evans RN  Outcome: Progressing     Problem: Skin/Tissue Integrity - Adult  Goal: Skin integrity remains intact  11/16/2022 1057 by John Coreas Pretty Mandel RN  Outcome: Progressing  Flowsheets (Taken 11/16/2022 0047 by Blas Sepulveda RN)  Skin Integrity Remains Intact:   Monitor for areas of redness and/or skin breakdown   Assess vascular access sites hourly   Every 4-6 hours minimum: Change oxygen saturation probe site   Every 4-6 hours: If on nasal continuous positive airway pressure, respiratory therapy assesses nares and determine need for appliance change or resting period  11/16/2022 0046 by Blas Sepulveda RN  Outcome: Progressing     Problem: Musculoskeletal - Adult  Goal: Return mobility to safest level of function  11/16/2022 1057 by Kashif Menon RN  Outcome: Progressing  Flowsheets (Taken 11/16/2022 0800)  Return Mobility to Safest Level of Function:   Assess patient stability and activity tolerance for standing, transferring and ambulating with or without assistive devices   Assist with transfers and ambulation using safe patient handling equipment as needed   Instruct patient/family in ordered activity level   Apply continuous passive motion per provider or physical therapy orders to increase flexion toward goal   Obtain physical therapy/occupational therapy consults as needed   Ensure adequate protection for wounds/incisions during mobilization  11/16/2022 0046 by Blas Sepulveda RN  Outcome: Progressing     Problem: Gastrointestinal - Adult  Goal: Maintains or returns to baseline bowel function  11/16/2022 1057 by Kashif Menon RN  Outcome: Progressing  11/16/2022 0046 by Blas Sepulveda RN  Outcome: Progressing     Problem: Infection - Adult  Goal: Absence of infection at discharge  11/16/2022 1057 by Kashif Menon RN  Outcome: Progressing  11/16/2022 0046 by Blas Sepulveda RN  Outcome: Progressing     Problem: Nutrition Deficit:  Goal: Optimize nutritional status  11/16/2022 1057 by Kashif Menon RN  Outcome: Progressing  Flowsheets (Taken 11/16/2022 0908 by Kathleen Oliva, MS, RD, LD)  Nutrient intake appropriate for improving, restoring, or maintaining nutritional needs:   Recommend appropriate diets, oral nutritional supplements, and vitamin/mineral supplements   Monitor oral intake, labs, and treatment plans  11/16/2022 0046 by Louis Luther RN  Outcome: Progressing

## 2022-11-16 NOTE — PROGRESS NOTES
Nutrition Note    RECOMMENDATIONS  PO Diet: Continue current diet. Added fruit to pt preferences on diet order  ONS: Increased ONS to TID  Other: Please document % po intake of meals & ONS    NUTRITION ASSESSMENT   Pt triggered for follow-up. Pt is in droplet-plus isolation r/t COVID-19+ but daughter was outside of room during [facility] rounds and information obtained from her. On regular diet, receiving Ensure BID. Minimal documented intakes throughout admission averaging 1-25%. Daughter reported ate good day before yesterday, poor intake yesterday; accepting ONS. Pt likes fruit. Family has been having trouble reaching kitchen to order pt food preferences. Also informed daughter she can bring in food, said she has brought in some of his favorites but still did not consume much. Will increase ONS to TID and continue to monitor for improved po intake. Nutrition Related Findings: +1.5L. Mg 2.80. LBM 11/9, BS+; glycolax ordered daily prn but not documented as given. Trace generalized. Wounds: None  Nutrition Education:  Education not indicated   Nutrition Goals: PO intake 50% or greater, by next RD assessment     MALNUTRITION ASSESSMENT   Acute Illness  Malnutrition Status: Moderate malnutrition  Findings of the 6 clinical characteristics of malnutrition:  Energy Intake:  75% or less of estimated energy requirements for 7 or more days  Weight Loss:  Greater than 5% over 1 month (Wt hx in EMR indicates 16 lb or 6.8% wt loss in 2.5 months)     Body Fat Loss:  Unable to assess     Muscle Mass Loss:  Unable to assess    Fluid Accumulation:  No significant fluid accumulation Extremities, Generalized   Strength:  Not Performed    NUTRITION DIAGNOSIS   Inadequate oral intake related to inadequate protein-energy intake as evidenced by Criteria as identified in malnutrition assessment, intake 0-25%    CURRENT NUTRITION THERAPIES  ADULT DIET;  Regular  ADULT ORAL NUTRITION SUPPLEMENT; Lunch, Dinner; Standard High Calorie/High Protein Oral Supplement     PO Intake: 1-25%   PO Supplement Intake:Unable to assess (but daughter reports accepting)    ANTHROPOMETRICS  Current Height: 6' 2\" (188 cm)  Current Weight: 203 lb 7.8 oz (92.3 kg)    Admission weight: 218 lb (98.9 kg) (bed wt)  Ideal Body Weight (IBW): 190 lbs  (86 kg)    Usual Bodyweight 234 lb (106.1 kg)       BMI: 26.1    COMPARATIVE STANDARDS  Energy (kcal):  4088-9011     Protein (g):  112-172       Fluid (mL/day):  0763-5996    The patient will be monitored per nutrition standards of care. Consult dietitian if additional nutrition interventions are needed prior to RD reassessment.      Berry Geller MS, RD, LD    Contact: [phone]

## 2022-11-16 NOTE — PROGRESS NOTES
11/16/22 1609   RT Protocol   History Pulmonary Disease 0   Respiratory pattern 0   Breath sounds 2   Cough 0   Bronchodilator Assessment Score 2

## 2022-11-16 NOTE — PROGRESS NOTES
Assessment complete. VSS. A-flutter noted on tele which has been noted since patient's admission. Afebrile. SpO2 @ 94% via 60 L [place] and FiO2 70%. Perez in place. Bed locked & in lowest position. Call light within reach.

## 2022-11-16 NOTE — PROGRESS NOTES
Hospitalist Progress Note      PCP: FAIZAN Maldonado - CNP    Date of Admission: 11/8/2022    Chief Complaint:  SOB    Hospital Course: admitted with COVID pneumonia. On HHFNC    Subjective: Remains on heated high flow nasal cannula-oxygen requirements went up and needing 80% FiO2 and 60 L flow. Patient remains short of breath. Medications:  Reviewed    Infusion Medications    sodium chloride Stopped (11/14/22 1015)     Scheduled Medications    enoxaparin  1 mg/kg SubCUTAneous BID    dexamethasone  20 mg IntraVENous Q24H    albuterol sulfate HFA  2 puff Inhalation BID    ipratropium  2 puff Inhalation BID    allopurinol  100 mg Oral Daily    donepezil  10 mg Oral Nightly    [Held by provider] ibrutinib  420 mg Oral Nightly    levothyroxine  100 mcg Oral Daily    primidone  50 mg Oral Daily    propranolol  20 mg Oral Daily    sodium chloride flush  5-40 mL IntraVENous 2 times per day     PRN Meds: sodium chloride flush, sodium chloride, ondansetron **OR** ondansetron, polyethylene glycol, acetaminophen **OR** acetaminophen      Intake/Output Summary (Last 24 hours) at 11/16/2022 1513  Last data filed at 11/16/2022 0435  Gross per 24 hour   Intake 765.58 ml   Output 975 ml   Net -209.42 ml       Physical Exam Performed:    /71   Pulse 83   Temp 97.7 °F (36.5 °C) (Temporal)   Resp 20   Ht 6' 2\" (1.88 m)   Wt 203 lb 7.8 oz (92.3 kg)   SpO2 97%   BMI 26.13 kg/m²     General appearance: lethargic  HEENT: Pupils equal, round, and reactive to light. Conjunctivae/corneas clear. Neck: Supple, with full range of motion. No jugular venous distention. Trachea midline. Respiratory:   Improving air entry. Occasional wheezing bilaterally  Cardiovascular: Regular rate and rhythm with normal S1/S2 without murmurs, rubs or gallops. Abdomen: Soft, non-tender, non-distended with normal bowel sounds. Musculoskeletal: No clubbing, cyanosis or edema bilaterally.   Full range of motion without deformity. Skin: Skin color, texture, turgor normal.  No rashes or lesions. Neurologic:  Neurovascularly intact without any focal sensory/motor deficits. Cranial nerves: II-XII intact, grossly non-focal.  Capillary Refill: Brisk, 3 seconds, normal   Peripheral Pulses: +2 palpable, equal bilaterally       Labs:   Recent Labs     11/14/22  0356 11/15/22  0540 11/16/22  0519   WBC 16.1* 17.5* 19.0*   HGB 11.5* 12.6* 12.6*   HCT 35.1* 38.7* 39.5*    165 150     Recent Labs     11/14/22  0356 11/15/22  0540 11/16/22  0519    141 140   K 5.2* 4.8 4.8    107 107   CO2 19* 22 22   BUN 43* 48* 66*   CREATININE 1.2 1.3 1.6*   CALCIUM 9.2 9.3 9.5   PHOS 2.3* 2.6  --      Recent Labs     11/14/22  0356 11/15/22  0540 11/16/22  0519   AST 41* 41* 34   ALT 61* 59* 48*   BILITOT 0.4 0.6 0.6   ALKPHOS 45 56 57     No results for input(s): INR in the last 72 hours. No results for input(s): Aleta Sherry in the last 72 hours. Urinalysis:      Lab Results   Component Value Date/Time    NITRU Negative 11/09/2022 02:30 PM    WBCUA 1 11/09/2022 02:30 PM    BACTERIA 4+ 11/09/2022 02:30 PM    RBCUA 0-2 11/09/2022 02:30 PM    BLOODU SMALL 11/09/2022 02:30 PM    SPECGRAV 1.020 11/09/2022 02:30 PM    GLUCOSEU Negative 11/09/2022 02:30 PM       Radiology:  XR CHEST PORTABLE   Final Result   Scattered bilateral airspace infiltrates consistent with multifocal pneumonia. XR CHEST PORTABLE   Final Result   No significant improvement. Multifocal bilateral infiltrates. Mild   cardiomegaly. CT CHEST WO CONTRAST   Final Result   Consolidation in the mid to lower lungs is compatible with multifocal   pneumonia. Suspected right hilar lymphadenopathy is likely reactive. Suggest follow-up contrast-enhanced chest CT within 3 months to assess for   resolution.          XR CHEST PORTABLE   Final Result   Findings consistent with bilateral mid-basilar pneumonia worse on the right   and/or congestive heart failure.  3-3.5 cm mass in the peripheral right mid   lung field should also be considered. Chest CT and/or follow-up to   resolution recommended. Assessment/Plan:    Active Hospital Problems    Diagnosis     Moderate malnutrition ([address] [E44.0]      Priority: Medium    Typical atrial flutter (HCC) [I48.3]      Priority: Medium    ARDS (adult respiratory distress syndrome) ([facility]) [J80]      Priority: Medium    ROSIE (acute kidney injury) ([address] [N17.9]      Priority: Medium    CLL (chronic lymphocytic leukemia) ([facility]) [C91.10]      Priority: Medium    Sepsis with acute hypoxic respiratory failure without septic shock ([address] [A41.9, R65.20, J96.01]      Priority: Medium    Acute respiratory failure with hypoxia ([address] [J96.01]      Priority: Medium    Atrial flutter by electrocardiogram ([facility] [I48.92]      Priority: Medium    History of chronic lymphocytic leukemia [Z85.6]      Priority: Medium    COVID-19 [U07.1]      Priority: Medium    Community acquired pneumonia [J18.9]      Priority: Medium    Septicemia ([address] [A41.9]      Priority: Medium    Primary hypertension [I10]      COVID-19 pneumonia: On Decadron ARDS dosing. Continue nebs as needed. On Lovenox twice daily. Procalcitonin not elevated. Acute hypoxic aspiratory failure: Secondary to COVID-19 pneumonia. On heated high flow nasal cannula-increasing oxygen requirements. Limited code per palliative care discussion. CLL: Ibrutinib on hold. Hematology on board    Atrial flutter: EP on board. Rate controlled. Currently on Lovenox twice daily. CKD stage III: Creatinine at baseline currently. Nephrology on board. On IV Lasix. Hypertension    Dementia: On Aricept    Hypothyroidism: Synthroid    Tremors: Continue primidone and propranolol    Palliative care on board. Limited code.     DVT Prophylaxis: Lovenox  Diet: ADULT ORAL NUTRITION SUPPLEMENT; Lunch, Dinner, Breakfast; Standard High Calorie/High Protein Oral Supplement  ADULT DIET; Regular; Pt likes fruit, please send on trays. Code Status: Limited  PT/OT Eval Status: Not at this time    Dispo -continue ICU care. Overall poor prognosis.     Timoteo Bang MD

## 2022-11-17 NOTE — PROGRESS NOTES
Pt found with airvo off. Pt very upset and verbalized that he did not want to wear it any longer. MD spoke with daughter. [facility] RN spoke with daughter. Arrangements made for Dr. Cristela Buchanan to speak with pt at 1400 to decide which direction to treat patient. Hospice vs LTAC.

## 2022-11-17 NOTE — PROGRESS NOTES
MD Jigna Newton MD Carmelita Grieve, MD                  Office: [phone]                      Fax: [phone]             [facility]                   NEPHROLOGY INPATIENT PROGRESS NOTE:     PATIENT NAME: Naye Styles  : 1934  MRN: [id number]      RECOMMENDATIONS:   -Holding Lasix with worsening ROSIE, can use as needed  - pulmonary fup for worsening hypoxia, COVID-19 mx   - multifocal pneumonia pic on CT, no major fluid overload     - Stopped bicarb infusion  - COVID-19 mx as per pulmonary team  - trend serum uric acid  -f/up w/ hem-onc   - monitor CBC w/ h/o CLL-diagnosed in 2021, was on ibrutinib  - IV antibiotic: Vancomycin: trough goal <15, pharmacy team assisting.   - willis inserted. - at higher risk for decompensation, needing closer monitoring.     D/C plan from renal stand point: fup w/ hypoxia   Have D/W patient, his wife team hospitalist     IMPRESSION:       Admitted on:  2022  1:28 PM   For:  Septicemia ([address] [A41.9]  Abnormal CXR [R93.89]  Acute respiratory failure with hypoxia ([address] [J96.01]  Atrial flutter by electrocardiogram ([address] [I48.92]  Sepsis ([address] [A41.9]  History of chronic lymphocytic leukemia [Z85.6]  Community acquired pneumonia, unspecified laterality [J18.9]   SARS-CoV-2 (+)  Hypoxia on 3L NC     ROSIE (on non-proteinuric CKD: stage 3B):   - BL Scr- lowest recently 1.4-1.5, as off 22   ->  1.8 on admission  - Etiology of ROSIE - presumed pre-renal now    - other differentials: unlikely  GN / TI / TMA process  - UA : needs it  - Renal imaging: on : 3-2021 w IV dye CT - Tiny nonobstructing right renal stone      Associated problems:   - Volume status: mild hypo-volemic  : HTN : no need for tight control    : Na: hyponatremia - mild 130 on admission, no recent h/o hypoNa, so likely acute, so should be ok for acute reversal     - Azotemia: pre-renal   - Electrolytes: K: WNL  - Acid-Base: acidosis, non-AGMA 15 on admission, LA WNL- checked blood gas - likely [email or web address]    - Anemia: of chronic disease        Other major problems: Management per primary and other consulting teams. H/O CLL     - CT chest -   Consolidation in the mid to lower lungs is compatible with multifocal pneumonia. Suspected right hilar lymphadenopathy is likely reactive. Suggest follow-up contrast-enhanced chest CT within 3 months to assess for resolution. -- would need fup w/ PCP      Hospital Problems             Last Modified POA    * (Principal) Septicemia ([address] 11/12/2022 Yes    Acute respiratory failure with hypoxia ([address] 11/12/2022 Yes    Atrial flutter by electrocardiogram ([address] 11/12/2022 Yes    History of chronic lymphocytic leukemia 11/12/2022 Yes    COVID-19 11/14/2022 Yes    Community acquired pneumonia 11/12/2022 Yes    ROSIE (acute kidney injury) ([address] 11/14/2022 Yes    CLL (chronic lymphocytic leukemia) ([address] 11/13/2022 Yes    Sepsis with acute hypoxic respiratory failure without septic shock ([address] 11/13/2022 Yes    Typical atrial flutter ([address] 11/14/2022 Yes    ARDS (adult respiratory distress syndrome) ([address] 11/14/2022 Yes    Moderate malnutrition ([address] (Chronic) 11/16/2022 Yes    Primary hypertension 11/14/2022 Yes   : other supportive care :   - Check daily renal function panel with electrolytes-phosphorus  - Strict monitoring of I/Os, daily weight  - Renal feeds/diet  - Current medications reviewed. - Nephrotoxic medications have been discontinued. - Dose adjusted and appropriate. - Dose meds for eGFR <15 mL/min/1.73m2 during ROSIE    - Avoid heavy opioids due to renal failure - may use very low dose dilaudid / fentanyl with close monitoring of CNS and respiratory depression. Please refer to the orders. High Complexity. Multiple complex problems. Discussed with patient and treatment team-    Time spent > 30 (~35) minutes. Thank you for allowing me to participate in this patient's care.  Please do not hesitate to contact me anytime. We will follow along with you. Akash Covarrubias MD,  [facility] [address] [place]: [phone] or Via [employer]  Fax: [phone]        =======================================================================================   =======================================================================================  Subjective / interval history:   Overall hypoxia requirement - but slightly better,  Renal function slightly worse. Given lasix recently   K better  Past medical, Surgical, Social, Family medical history reviewed by me. MEDICATIONS: reviewed by me. Medications Prior to Admission:  No current facility-administered medications on file prior to encounter. Current Outpatient Medications on File Prior to Encounter   Medication Sig Dispense Refill    amLODIPine (NORVASC) 10 MG tablet TAKE 1 TABLET BY MOUTH DAILY 90 tablet 3    torsemide (DEMADEX) 20 MG tablet TAKE 1 TABLET BY MOUTH DAILY 30 tablet 5    potassium chloride (KLOR-CON M) 10 MEQ extended release tablet TAKE 1 TABLET BY MOUTH DAILY 90 tablet 3    donepezil (ARICEPT) 10 MG tablet Take 1 tablet by mouth nightly 90 tablet 3    primidone (MYSOLINE) 50 MG tablet TAKE 1 TABLET BY MOUTH DAILY 90 tablet 3    levothyroxine (SYNTHROID) 100 MCG tablet Take 1 tablet by mouth Daily 90 tablet 3    propranolol (INDERAL) 20 MG tablet TAKE 1 TABLET BY MOUTH DAILY 90 tablet 3    allopurinol (ZYLOPRIM) 100 MG tablet Take 100 mg by mouth daily      ibrutinib (IMBRUVICA) 420 MG tablet Take 420 mg by mouth daily      Calcium Carb-Cholecalciferol (CALCIUM/VITAMIN D PO) Take by mouth 1200 mg qd      multivitamin (ANTIOXIDANT;PROSIGHT) TABS per tablet Take 1 tablet by mouth daily.            Current Facility-Administered Medications:     morphine (PF) injection 2 mg, 2 mg, IntraVENous, Once, Bety Ferrara MD    morphine (PF) injection 2 mg, 2 mg, IntraVENous, Q4H PRN, Bety Ferrara MD, 2 mg at 11/17/22 0954    enoxaparin (LOVENOX) injection 100 mg, 1 mg/kg, SubCUTAneous, BID, Freida Marcano MD, 100 mg at 11/17/22 0953    dexamethasone (DECADRON) 20 mg in sodium chloride 0.9 % 50 mL IVPB, 20 mg, IntraVENous, Q24H, rBenda Finley MD, Last Rate: 100 mL/hr at 11/17/22 0957, 20 mg at 11/17/22 0957    albuterol sulfate HFA (PROVENTIL;VENTOLIN;PROAIR) 108 (90 Base) MCG/ACT inhaler 2 puff, 2 puff, Inhalation, BID, Rayna Nava MD, 2 puff at 11/17/22 0927    ipratropium (ATROVENT HFA) 17 MCG/ACT inhaler 2 puff, 2 puff, Inhalation, BID, Rayna Nava MD, 2 puff at 11/17/22 0929    allopurinol (ZYLOPRIM) tablet 100 mg, 100 mg, Oral, Daily, Devendra Jensen MD, 100 mg at 11/17/22 0953    donepezil (ARICEPT) tablet 10 mg, 10 mg, Oral, Nightly, Devendra Jensen MD, 10 mg at 11/16/22 2036    [Held by provider] ibrutinib (IMBRUVICA) chemo tablet 420 mg PATIENT SUPPLIED (Patient Supplied), 420 mg, Oral, Nightly, Devendra Jensen MD, 420 mg at 11/14/22 2035    levothyroxine (SYNTHROID) tablet 100 mcg, 100 mcg, Oral, Daily, Devendra Jensen MD, 100 mcg at 11/17/22 0511    primidone (MYSOLINE) tablet 50 mg, 50 mg, Oral, Daily, Devendra Jensen MD, 50 mg at 11/17/22 0953    propranolol (INDERAL) tablet 20 mg, 20 mg, Oral, Daily, Rayna Nava MD, 20 mg at 11/17/22 0953    sodium chloride flush 0.9 % injection 5-40 mL, 5-40 mL, IntraVENous, 2 times per day, Rayna Nava MD, 10 mL at 11/16/22 2112    sodium chloride flush 0.9 % injection 5-40 mL, 5-40 mL, IntraVENous, PRN, Devendra Jensen MD    0.9 % sodium chloride infusion, , IntraVENous, PRN, Rayna Nava MD, Stopped at 11/14/22 1015    ondansetron (ZOFRAN-ODT) disintegrating tablet 4 mg, 4 mg, Oral, Q8H PRN **OR** ondansetron (ZOFRAN) injection 4 mg, 4 mg, IntraVENous, Q6H PRN, Devendra Jensen MD    polyethylene glycol (GLYCOLAX) packet 17 g, 17 g, Oral, Daily PRN, Rayna Nava MD    acetaminophen (TYLENOL) tablet 650 mg, 650 mg, Oral, Q6H PRN, 650 mg at 11/17/22 0146 **OR** acetaminophen (TYLENOL) suppository 650 mg, 650 mg, Rectal, Q6H PRN, Tina Baxter MD         =======================================================================================     PHYSICAL EXAM:  Recent vital signs and recent I/Os reviewed by me. Wt Readings from Last 3 Encounters:   11/17/22 206 lb 5.6 oz (93.6 kg)   09/02/22 234 lb 6.4 oz (106.3 kg)   06/02/22 240 lb (108.9 kg)     BP Readings from Last 3 Encounters:   11/17/22 110/68   09/02/22 124/70   06/30/22 124/63     Patient Vitals for the past 24 hrs:   BP Temp Temp src Pulse Resp SpO2 Weight   11/17/22 0954 -- -- -- -- 24 -- --   11/17/22 0615 110/68 -- -- 84 18 99 % --   11/17/22 0511 107/64 -- -- 84 20 94 % --   11/17/22 0500 -- -- -- 79 16 91 % --   11/17/22 0400 119/72 98 °F (36.7 °C) Temporal 84 15 94 % 206 lb 5.6 oz (93.6 kg)   11/17/22 0300 (!) 108/92 -- -- 80 14 96 % --   11/17/22 0200 124/67 -- -- 85 19 93 % --   11/17/22 0100 122/67 -- -- 84 22 99 % --   11/17/22 0016 -- -- -- 84 -- 98 % --   11/17/22 0000 121/70 98.5 °F (36.9 °C) Temporal 84 18 97 % --   11/16/22 2300 132/71 -- -- 83 24 91 % --   11/16/22 2200 137/75 -- -- 84 19 94 % --   11/16/22 2100 121/80 -- -- 84 20 96 % --   11/16/22 2000 130/70 98.2 °F (36.8 °C) Temporal 83 16 92 % --   11/16/22 1950 -- -- -- -- 20 94 % --   11/16/22 1900 130/83 -- -- 83 23 95 % --   11/16/22 1800 125/74 98 °F (36.7 °C) Temporal 81 19 (!) 73 % --   11/16/22 1600 134/78 -- -- 83 16 92 % --   11/16/22 1550 -- -- -- -- -- 93 % --   11/16/22 1500 (!) 110/96 -- -- 83 17 -- --   11/16/22 1400 120/71 -- -- 83 20 97 % --   11/16/22 1300 117/80 -- -- 80 15 92 % --   11/16/22 1258 -- -- -- -- 20 93 % --   11/16/22 1200 130/71 -- -- 76 19 98 % --   11/16/22 1100 118/65 -- -- 69 21 93 % --         Intake/Output Summary (Last 24 hours) at 11/17/2022 1005  Last data filed at 11/17/2022 0552  Gross per 24 hour   Intake 250 ml   Output 800 ml   Net -550 ml           Physical Exam  Vitals reviewed.    Constitutional:       General: He is   in acute distress. Appearance: Normal appearance. He is ill-appearing. HENT:      Head: Normocephalic and atraumatic. Right Ear: External ear normal.      Left Ear: External ear normal.      Nose: Nose normal.      Mouth/Throat:      Mouth: Mucous membranes are moist. Mucous membranes are not dry. Eyes:      General: No scleral icterus. Conjunctiva/sclera: Conjunctivae normal.   Neck:      Vascular: No JVD. Cardiovascular:      Rate and Rhythm: Normal rate and regular rhythm. Heart sounds: S1 normal and S2 normal.   Pulmonary:      Effort: Pulmonary effort is ab-normal. . Needing NIPPV     Breath sounds: Rhonchi present. Abdominal:      General: Bowel sounds are normal. There is   distension. Musculoskeletal:         General: Swelling (mild) present. No deformity. Cervical back: Normal range of motion and neck supple. Skin:     General: Skin is dry. Coloration: Skin is not jaundiced. Neurological:      Mental Status: Drowsy, arouses able,  Psychiatric:      Unable to fully obtain    =======================================================================================     DATA:  Diagnostic tests reviewed by me for today's visit:   (AS NEEDED FOR MY EVALUATION AND MANAGEMENT). Recent Labs     11/15/22  0540 11/16/22  0519 11/17/22  0401   WBC 17.5* 19.0* 20.2*   HCT 38.7* 39.5* 37.0*    150 160       Iron Saturation:  No components found for: PERCENTFE  FERRITIN:  No results found for: FERRITIN  IRON:  No results found for: IRON  TIBC:  No results found for: TIBC    Recent Labs     11/15/22  0540 11/16/22 0519 11/17/22  0401    140 139   K 4.8 4.8 4.8    107 107   CO2 22 22 22   BUN 48* 66* 68*   CREATININE 1.3 1.6* 1.8*       Recent Labs     11/15/22  0540 11/16/22 0519 11/17/22  0401   CALCIUM 9.3 9.5 9.3   MG 2.50* 2.80* 2.70*   PHOS 2.6  --   --        No results for input(s): PH, PCO2, PO2 in the last 72 hours.     Invalid input(s): [id number], INSPIREDO2    ABG:  No results found for: PH, PCO2, PO2, HCO3, BE, THGB, TCO2, O2SAT  VBG:    Lab Results   Component Value Date/Time    PHVEN 7.396 11/10/2022 11:37 AM    WUF7YIO 24.7 11/10/2022 11:37 AM    BEVEN -8.1 11/10/2022 11:37 AM    R5NVASJO 100 11/10/2022 11:37 AM       LDH:  No results found for: LDH  Uric Acid:    Lab Results   Component Value Date/Time    LABURIC 7.9 11/10/2022 05:59 AM       PT/INR:    Lab Results   Component Value Date/Time    PROTIME 12.6 06/25/2010 06:30 AM    INR 1.19 06/25/2010 06:30 AM     Warfarin PT/INR:  No components found for: PTPATWAR, PTINRWAR  PTT:    Lab Results   Component Value Date/Time    APTT 34.6 06/25/2010 06:30 AM   [APTT}  Last 3 Troponin:    Lab Results   Component Value Date/Time    TROPONINI 0.01 11/08/2022 02:57 PM       U/A:    Lab Results   Component Value Date/Time    NITRITE neg 02/01/2018 03:01 PM    COLORU Yellow 11/09/2022 02:30 PM    PROTEINU 100 11/09/2022 02:30 PM    PHUR 6.0 11/09/2022 02:30 PM    WBCUA 1 11/09/2022 02:30 PM    RBCUA 0-2 11/09/2022 02:30 PM    BACTERIA 4+ 11/09/2022 02:30 PM    CLARITYU CLOUDY 11/09/2022 02:30 PM    SPECGRAV 1.020 11/09/2022 02:30 PM    LEUKOCYTESUR SMALL 11/09/2022 02:30 PM    UROBILINOGEN 1.0 11/09/2022 02:30 PM    BILIRUBINUR Negative 11/09/2022 02:30 PM    BILIRUBINUR neg 02/01/2018 03:01 PM    BLOODU SMALL 11/09/2022 02:30 PM    GLUCOSEU Negative 11/09/2022 02:30 PM    AMORPHOUS Present 11/09/2022 02:30 PM     Microalbumen/Creatinine ratio:  No components found for: RUCREAT  24 Hour Urine for Protein:  No components found for: RAWUPRO, UHRS3, RJKP80CX, UTV3  24 Hour Urine for Creatinine Clearance:  No components found for: CREAT4, UHRS10, UTV10  Urine Toxicology:  No components found for: IAMMENTA, IBARBIT, IBENZO, ICOCAINE, IMARTHC, IOPIATES, IPHENCYC    HgBA1c:  No results found for: LABA1C  RPR:  No results found for: RPR  HIV:  No results found for: HIV  [place]:  No results found for: ANATITER, [place]  RF:  No results found for: RF  DSDNA:  No components found for: DNA  AMYLASE:  No results found for: AMYLASE  LIPASE:  No results found for: LIPASE  Fibrinogen Level:  No components found for: FIB       BELOW MENTIONED RADIOLOGY STUDY RESULTS BY ME (AS NEEDED FOR MY EVALUATION AND MANAGEMENT). CT CHEST WO CONTRAST    Result Date: 11/8/2022  Consolidation in the mid to lower lungs is compatible with multifocal pneumonia. Suspected right hilar lymphadenopathy is likely reactive. Suggest follow-up contrast-enhanced chest CT within 3 months to assess for resolution. This report was transcribed using voice recognition software, mainly. So please excuse brevity and/or typos. Every effort was made to ensure accuracy, however, inadvertent computerized transcription errors may be present. Please contact us, if any questions or clarifications are needed.

## 2022-11-17 NOTE — PLAN OF CARE
Problem: Discharge Planning  Goal: Discharge to home or other facility with appropriate resources  Outcome: Progressing     Problem: Pain  Goal: Verbalizes/displays adequate comfort level or baseline comfort level  Outcome: Progressing  Flowsheets (Taken 11/17/2022 0800)  Verbalizes/displays adequate comfort level or baseline comfort level:   Encourage patient to monitor pain and request assistance   Assess pain using appropriate pain scale   Administer analgesics based on type and severity of pain and evaluate response   Implement non-pharmacological measures as appropriate and evaluate response   Consider cultural and social influences on pain and pain management     Problem: Skin/Tissue Integrity  Goal: Absence of new skin breakdown  Description: 1. Monitor for areas of redness and/or skin breakdown  2. Assess vascular access sites hourly  3. Every 4-6 hours minimum:  Change oxygen saturation probe site  4. Every 4-6 hours:  If on nasal continuous positive airway pressure, respiratory therapy assess nares and determine need for appliance change or resting period.   Outcome: Progressing     Problem: ABCDS Injury Assessment  Goal: Absence of physical injury  Outcome: Progressing     Problem: Safety - Adult  Goal: Free from fall injury  Outcome: Progressing     Problem: Respiratory - Adult  Goal: Achieves optimal ventilation and oxygenation  Outcome: Progressing     Problem: Cardiovascular - Adult  Goal: Maintains optimal cardiac output and hemodynamic stability  Outcome: Progressing     Problem: Skin/Tissue Integrity - Adult  Goal: Skin integrity remains intact  Outcome: Progressing     Problem: Musculoskeletal - Adult  Goal: Return mobility to safest level of function  Outcome: Progressing     Problem: Gastrointestinal - Adult  Goal: Maintains or returns to baseline bowel function  Outcome: Progressing     Problem: Infection - Adult  Goal: Absence of infection at discharge  Outcome: Progressing     Problem: Nutrition Deficit:  Goal: Optimize nutritional status  Outcome: Progressing     Problem: Dyspnea Due to End of Life  Goal: Demonstrate understanding of and ability to manage respiratory symptoms at end of life  Description: Patient  and or family/caregiver will verbalize recall of breathing strategies to maintain an effective breathing pattern during the inpatient hospice stay. Outcome: Progressing     Problem: Communication Deficit  Goal: Effectively communicate symptoms, needs, and concerns  Description: Patient  and/or family/caregiver will be able to communicate symptoms, needs, and concerns as evidenced by the use of language services during the inpatient hospice stay.     Outcome: Progressing

## 2022-11-17 NOTE — ACP (ADVANCE CARE PLANNING)
ADVANCED CARE PLANNING    Name:Reji Colvin       :  1934              MRN:  [id number]      Purpose of Encounter: Advanced care planning in light of declining respiratory status from severe COVID-19 infection. Parties in attendance: :Michael Pearl, Manohar Celis MD, Family members: Patient and daughter, Tonny Bermudez. Decisional Capacity:Yes    Subjective/Patient Story: Patient/family understand(s) that  his function continues to deteriorate. Patient/family no longer wishes further curative interventions, if there is no long term benefit :Yes      Objective/Medical Story: Severe respiratory distress and worsening hypoxia related to COVID-19 infection. Goals of Care Determinations: Patient/family wish(es to focus on comfort care measures only. Plan: Will notify FAIZAN Weston CNP of change in care plan. Will look at further interventions as needed. Code Status:  At this time patient wishes to be DNR-CC    Time Spent on Advanced Planning Discussion: 30 minutes      Electronically signed by Manohar Celis MD on 2022 at 3:22 PM

## 2022-11-17 NOTE — PROGRESS NOTES
[facility] Pulmonary/CCM Progress note      Admit Date: 11/8/2022    Chief Complaint: Respiratory distress    Subjective: Interval History: Significant decline in clinical status noted today, with tachypnea and respiratory distress. Appears to have a congested cough. Does not appear to be able to bring up phlegm. Appears to be in pain and discomfort.     Scheduled Meds:   scopolamine  1 patch TransDERmal Q72H    enoxaparin  1 mg/kg SubCUTAneous BID    dexamethasone  20 mg IntraVENous Q24H    albuterol sulfate HFA  2 puff Inhalation BID    ipratropium  2 puff Inhalation BID    allopurinol  100 mg Oral Daily    donepezil  10 mg Oral Nightly    [Held by provider] ibrutinib  420 mg Oral Nightly    levothyroxine  100 mcg Oral Daily    primidone  50 mg Oral Daily    propranolol  20 mg Oral Daily    sodium chloride flush  5-40 mL IntraVENous 2 times per day     Continuous Infusions:   sodium chloride Stopped (11/14/22 1015)     PRN Meds:morphine, midazolam, sodium chloride flush, sodium chloride, ondansetron **OR** ondansetron, polyethylene glycol, acetaminophen **OR** acetaminophen    Review of Systems  Constitutional: Fatigue and malaise  Ears, nose, mouth, throat: negative for ear drainage, epistaxis, hoarseness, nasal congestion, sore throat and voice change  Respiratory: negative except for cough and shortness of breath  Cardiovascular: negative for chest pain, chest pressure/discomfort, irregular heart beat, lower extremity edema and palpitations  Gastrointestinal: negative for abdominal pain, constipation, diarrhea, jaundice, melena, odynophagia, reflux symptoms and vomiting  Hematologic/lymphatic: negative for bleeding, easy bruising, lymphadenopathy and petechiae  Musculoskeletal:negative for arthralgias, bone pain, muscle weakness, neck pain and stiff joints  Neurological: negative for dizziness, gait problems, headaches, seizures, speech problems, tremors and weakness  Behavioral/Psych: negative for anxiety, behavior problems, depression, fatigue and sleep disturbance  Endocrine: negative for diabetic symptoms including none, neuropathy, polyphagia, polyuria, polydipsia, vomiting and diarrhea and temperature intolerance  Allergic/Immunologic: negative for anaphylaxis, angioedema, hay fever and urticaria    Objective:     Patient Vitals for the past 8 hrs:   BP Temp Temp src Pulse Resp SpO2   11/17/22 1300 (!) 108/52 -- -- 93 22 92 %   11/17/22 1200 (!) 117/52 99 °F (37.2 °C) Temporal 95 23 96 %   11/17/22 1134 -- -- -- -- 24 --   11/17/22 1100 133/65 -- -- 89 21 93 %   11/17/22 1024 -- -- -- -- 22 --   11/17/22 1000 134/60 -- -- 96 21 92 %   11/17/22 0954 -- -- -- -- 24 --   11/17/22 0900 (!) 145/110 -- -- 91 26 --   11/17/22 0800 113/63 99.6 °F (37.6 °C) Temporal 86 23 (!) 85 %       I/O last 3 completed shifts: In: 1015.6 [P.O.:440; I.V.:88.3; IV Piggyback:487.3]  Out: 1775 [Urine:1775]  No intake/output data recorded.     General Appearance: alert, moderate respiratory distress  Skin: warm and dry, no rash or erythema  Head: normocephalic and atraumatic  Eyes: pupils equal, round, and reactive to light, extraocular eye movements intact, conjunctivae normal  ENT: external ear and ear canal normal bilaterally, nose without deformity, nasal mucosa and turbinates normal  Neck: supple and non-tender without mass, no cervical lymphadenopathy  Pulmonary/Chest: Good air entry, clear with no wheezes or crackles noted today  Cardiovascular: normal rate, regular rhythm,  no murmurs, rubs, distal pulses intact, no carotid bruits  Abdomen: soft, non-tender, non-distended, normal bowel sounds, no masses or organomegaly  Lymph Nodes: Cervical, supraclavicular normal  Extremities: no cyanosis, clubbing or edema  Musculoskeletal: normal range of motion, no joint swelling, deformity or tenderness  Neurologic: alert, no focal neurologic deficits    Data Review:  CBC:   Lab Results   Component Value Date/Time    WBC 20.2 11/17/2022 04:01 AM    RBC 4.42 11/17/2022 04:01 AM     BMP:   Lab Results   Component Value Date/Time    GLUCOSE 98 11/17/2022 04:01 AM    CO2 22 11/17/2022 04:01 AM    BUN 68 11/17/2022 04:01 AM    CREATININE 1.8 11/17/2022 04:01 AM    CALCIUM 9.3 11/17/2022 04:01 AM     ABG: No results found for: CNT0YKS, BEART, P2WBCMIP, PHART, THGBART, CBM6AWM, PO2ART, QTJ9YAE    Radiology: All pertinent images / reports were reviewed as a part of this visit. Narrative   EXAMINATION:   ONE XRAY VIEW OF THE CHEST       11/14/2022 10:15 am       COMPARISON:   None. HISTORY:   ORDERING SYSTEM PROVIDED HISTORY: check for infiltrates   TECHNOLOGIST PROVIDED HISTORY:   Reason for exam:->check for infiltrates   Reason for Exam: Check for infiltrates       FINDINGS:   Scattered bilateral airspace infiltrates. No pneumothorax or pleural   effusion. Mild cardiomegaly. Impression   Scattered bilateral airspace infiltrates consistent with multifocal pneumonia. Problem List:     Acute hypoxic respiratory failure  Multifocal pneumonia related to COVID-19 infection  Acute on chronic CKD  CLL    Assessment/Plan:     Acute hypoxic respiratory failure related to severe COVID-19 infection and ARDS. Chest x-ray performed today, reviewed, persistent bilateral infiltrates noted. Remains on Airvo, 70% / 60 L. Patient appears in more respiratory distress today. Concerns for ongoing aspiration    Leukocytosis, WBC 20, most likely related to CLL +/- stress-induced. Monitor only. Completed course of empiric cefepime-procalcitonin is normal.    ROSIE, creatinine is 1.8. Patient has history of stage III CKD. Possibly related to poor oral intake    Patient is already receiving therapeutic Lovenox for history of atrial flutter. Patient is now receiving morphine for pain control, appears to be more drowsy this p.m. Multiple discussions with patient daughter and I also perceived to discuss with patient directly about goals of care. Patient appears to understand the fact that he is very ill, but was unable to communicate effectively about his desires with his care, due to drowsiness and fatigue. He appeared to be phasing in and out, with his mental status. Given overall poor prognosis, we will institute comfort care measures and use morphine + Versed as needed. Family is in agreement.     Humberto Mast MD

## 2022-11-17 NOTE — PROGRESS NOTES
Hospitalist Progress Note      PCP: FAIZAN Laureano - CNP    Date of Admission: 11/8/2022    Chief Complaint:  SOB    Hospital Course: admitted with COVID pneumonia. On [facility]    Subjective: Worsening oxygen requirements. Patient is getting morphine for comfort. DNR CC. Family at bedside. Medications:  Reviewed    Infusion Medications    sodium chloride Stopped (11/14/22 1015)     Scheduled Medications    scopolamine  1 patch TransDERmal Q72H    enoxaparin  1 mg/kg SubCUTAneous BID    dexamethasone  20 mg IntraVENous Q24H    albuterol sulfate HFA  2 puff Inhalation BID    ipratropium  2 puff Inhalation BID    allopurinol  100 mg Oral Daily    donepezil  10 mg Oral Nightly    [Held by provider] ibrutinib  420 mg Oral Nightly    levothyroxine  100 mcg Oral Daily    primidone  50 mg Oral Daily    propranolol  20 mg Oral Daily    sodium chloride flush  5-40 mL IntraVENous 2 times per day     PRN Meds: morphine, midazolam, sodium chloride flush, sodium chloride, ondansetron **OR** ondansetron, polyethylene glycol, acetaminophen **OR** acetaminophen      Intake/Output Summary (Last 24 hours) at 11/17/2022 1527  Last data filed at 11/17/2022 0552  Gross per 24 hour   Intake 250 ml   Output 800 ml   Net -550 ml       Physical Exam Performed:    BP (!) 108/52   Pulse 93   Temp 99 °F (37.2 °C) (Temporal)   Resp 22   Ht 6' 2\" (1.88 m)   Wt 206 lb 5.6 oz (93.6 kg)   SpO2 92%   BMI 26.49 kg/m²     General appearance: Resting in bed. HEENT: Pupils equal, round, and reactive to light. Conjunctivae/corneas clear. Neck: Supple, with full range of motion. No jugular venous distention. Trachea midline. Respiratory:   Bilateral rhonchi.  cardiovascular: Regular rate and rhythm with normal S1/S2 without murmurs, rubs or gallops. Abdomen: Soft, non-tender, non-distended with normal bowel sounds. Musculoskeletal: No clubbing, cyanosis or edema bilaterally. Full range of motion without deformity.   Skin: Skin color, texture, turgor normal.  No rashes or lesions. Neurologic:   No gross focal deficits. .  Capillary Refill: Brisk, 3 seconds, normal   Peripheral Pulses: +2 palpable, equal bilaterally       Labs:   Recent Labs     11/15/22  0540 11/16/22 0519 11/17/22  0401   WBC 17.5* 19.0* 20.2*   HGB 12.6* 12.6* 11.7*   HCT 38.7* 39.5* 37.0*    150 160     Recent Labs     11/15/22  0540 11/16/22  0519 11/17/22  0401    140 139   K 4.8 4.8 4.8    107 107   CO2 22 22 22   BUN 48* 66* 68*   CREATININE 1.3 1.6* 1.8*   CALCIUM 9.3 9.5 9.3   PHOS 2.6  --   --      Recent Labs     11/15/22  0540 11/16/22 0519 11/17/22  0401   AST 41* 34 36   ALT 59* 48* 39   BILITOT 0.6 0.6 0.6   ALKPHOS 56 57 56     No results for input(s): INR in the last 72 hours. No results for input(s): [place]ounds in the last 72 hours. Urinalysis:      Lab Results   Component Value Date/Time    NITRU Negative 11/09/2022 02:30 PM    WBCUA 1 11/09/2022 02:30 PM    BACTERIA 4+ 11/09/2022 02:30 PM    RBCUA 0-2 11/09/2022 02:30 PM    BLOODU SMALL 11/09/2022 02:30 PM    SPECGRAV 1.020 11/09/2022 02:30 PM    GLUCOSEU Negative 11/09/2022 02:30 PM       Radiology:  XR CHEST PORTABLE   Final Result   Slight increase in diffuse pattern of ground-glass airspace opacification   bilaterally. XR CHEST PORTABLE   Final Result   Scattered bilateral airspace infiltrates consistent with multifocal pneumonia. XR CHEST PORTABLE   Final Result   No significant improvement. Multifocal bilateral infiltrates. Mild   cardiomegaly. CT CHEST WO CONTRAST   Final Result   Consolidation in the mid to lower lungs is compatible with multifocal   pneumonia. Suspected right hilar lymphadenopathy is likely reactive. Suggest follow-up contrast-enhanced chest CT within 3 months to assess for   resolution.          XR CHEST PORTABLE   Final Result   Findings consistent with bilateral mid-basilar pneumonia worse on the right and/or congestive heart failure.  3-3.5 cm mass in the peripheral right mid   lung field should also be considered. Chest CT and/or follow-up to   resolution recommended. Assessment/Plan:    Active Hospital Problems    Diagnosis     Moderate malnutrition ([address] [E44.0]      Priority: Medium    Typical atrial flutter (HCC) [I48.3]      Priority: Medium    ARDS (adult respiratory distress syndrome) ([facility]) [J80]      Priority: Medium    ROSIE (acute kidney injury) ([address] [N17.9]      Priority: Medium    CLL (chronic lymphocytic leukemia) ([facility]) [C91.10]      Priority: Medium    Sepsis with acute hypoxic respiratory failure without septic shock ([address] [A41.9, R65.20, J96.01]      Priority: Medium    Acute respiratory failure with hypoxia ([address] [J96.01]      Priority: Medium    Atrial flutter by electrocardiogram ([facility] [I48.92]      Priority: Medium    History of chronic lymphocytic leukemia [Z85.6]      Priority: Medium    COVID-19 [U07.1]      Priority: Medium    Community acquired pneumonia [J18.9]      Priority: Medium    Septicemia ([address] [A41.9]      Priority: Medium    Primary hypertension [I10]      COVID-19 pneumonia: On Decadron ARDS dosing. Continue nebs as needed. On Lovenox twice daily. Procalcitonin not elevated. Acute hypoxic aspiratory failure: Secondary to COVID-19 pneumonia. On heated high flow nasal cannula-increasing oxygen requirements. DNR CC per family/healthcare discussion. CLL: Ibrutinib on hold. Hematology on board    Atrial flutter: EP on board. Rate controlled. Currently on Lovenox twice daily. CKD stage III:    Nephrology on board. Lasix stopped. Creatinine uptrending. Hypertension    Dementia: On Aricept    Hypothyroidism: Synthroid    Tremors: Continue primidone and propranolol    Palliative care on board.   DNR CC    DVT Prophylaxis: Lovenox  Diet: ADULT ORAL NUTRITION SUPPLEMENT; Lunch, Dinner, Breakfast; Standard High Calorie/High Protein Oral Supplement  ADULT DIET; Regular; Pt likes fruit, please send on trays. Code Status: DNR-CC  PT/OT Eval Status: Not at this time    Dispo -continue ICU care. Overall poor prognosis.     Edmar Ridley MD

## 2022-11-17 NOTE — PLAN OF CARE
Problem: Pain  Goal: Verbalizes/displays adequate comfort level or baseline comfort level  Recent Flowsheet Documentation  Taken 11/17/2022 0800 by Nica Mckeon RN  Verbalizes/displays adequate comfort level or baseline comfort level:   Encourage patient to monitor pain and request assistance   Assess pain using appropriate pain scale   Administer analgesics based on type and severity of pain and evaluate response   Implement non-pharmacological measures as appropriate and evaluate response   Consider cultural and social influences on pain and pain management

## 2022-11-17 NOTE — PROGRESS NOTES
Patient now hospice status. Airvo discontinued at 1700 post wife and family meeting. Daughter Mac Dus at bedside. [facility] at [facility]25 [facility]. Instructed to call [facility] when patient expires.

## 2022-11-18 NOTE — DEATH NOTES
Death Pronouncement Note  Patient's Name: Yoana Flores   Patient's YOB: 1934  MRN Number: [id number]    Admitting Provider: Moody Clement MD  Attending Provider: Elizabeth Gtz MD    Patient was examined and the following were absent: Pulses, Blood Pressure, and Respiratory effort. Pupils fixed and dilated.      I declared the patient dead on 11/18/2022 at 03:01    Preliminary Cause of Death: Electronically signed by Harry Min MD on 11/18/22 at 3:25 AM EST

## 2022-11-19 NOTE — DISCHARGE SUMMARY
Hospital Medicine Discharge Summary/death note    Patient ID: Blayne Cazares      Patient's PCP: Maeve Zimmerman, APRN - CNP    Admitting Physician: Jeimy Fang MD  Discharge Physician: Kaylen Oh MD     Admit Date: 2022     Disposition:     Discharge Diagnoses: Active Hospital Problems    Diagnosis     Moderate malnutrition ([address] [E44.0]      Priority: Medium    Typical atrial flutter (HCC) [I48.3]      Priority: Medium    ARDS (adult respiratory distress syndrome) (HCC) [J80]      Priority: Medium    ROSIE (acute kidney injury) ([address] [N17.9]      Priority: Medium    CLL (chronic lymphocytic leukemia) (HCC) [C91.10]      Priority: Medium    Sepsis with acute hypoxic respiratory failure without septic shock ([address] [A41.9, R65.20, J96.01]      Priority: Medium    Acute respiratory failure with hypoxia ([address] [J96.01]      Priority: Medium    Atrial flutter by electrocardiogram ([address] [I48.92]      Priority: Medium    History of chronic lymphocytic leukemia [Z85.6]      Priority: Medium    COVID-19 [U07.1]      Priority: Medium    Community acquired pneumonia [J18.9]      Priority: Medium    Septicemia ([address] [A41.9]      Priority: Medium    Primary hypertension [I10]              Hospital Course:     80-year-old male with a history of CLL-on ibrutinib, atrial flutter, CKD stage III, hypertension, dementia, hypothyroidism, tremors came in with shortness of breath. Found to have COVID-19 pneumonia. Had acute hypoxic respiratory failure on admission as well. Treated with Decadron and empiric antibiotics. Procalcitonin not elevated. Patient's oxygenation overall worsened during the hospital course and needed heated high flow nasal cannula. Treated with Decadron ARDS dosing and duo nebs. Given overall worsening and poor prognosis , palliative care consulted. Patient's CODE STATUS was changed to DNR CC.     Date of Death: 2022  Time of Death: 03:01    Cause of death:  #1 acute hypoxic respite failure  #2. COVID-19 pneumonia      Consults:  IP CONSULT TO HOSPITALIST  IP CONSULT TO ONCOLOGY  IP CONSULT TO PALLIATIVE CARE  IP CONSULT TO PULMONOLOGY  IP CONSULT TO CARDIOLOGY    Signed:  Blossom Orourke MD MD   11/19/2022    Thank you FAIZAN Laureano CNP for the opportunity to be involved in this patient's care. If you have any questions or concerns, please feel free to contact me at [phone].

## 2022-12-02 ENCOUNTER — TELEPHONE (OUTPATIENT)
Dept: PULMONOLOGY | Age: 87
End: 2022-12-02

## 2022-12-02 NOTE — TELEPHONE ENCOUNTER
Pt daughter called and said that Dr Rome Washington was with her dad during the process of his death. She is having trouble getting the hospitalitis to sign the death certificate.     She's not asking for Dr Rome Washington to sign she wants to know if he can look into this as it's been 2 weeks and they can't make any arrangements for burial.     # [phone]

## 2022-12-02 NOTE — TELEPHONE ENCOUNTER
Spoke to the patient's daughter Dr Vinh Zayas spoke to a Dr Anita Gold the patient daughter is to call the  home and give them Dr Anita Gold direct number [phone] so the death certificate can get sent directly to him to sign

## 2023-05-01 NOTE — PROGRESS NOTES
ONCOLOGY HEMATOLOGY CARE PROGRESS NOTE      SUBJECTIVE: Patient remains on high flow O2, he remains about the same, spoke with RN. ROS:     Somnolent not performed      OBJECTIVE        Physical    VITALS:  /68   Pulse 84   Temp 98 °F (36.7 °C) (Temporal)   Resp 18   Ht 6' 2\" (1.88 m)   Wt 206 lb 5.6 oz (93.6 kg)   SpO2 99%   BMI 26.49 kg/m²   TEMPERATURE:  Current - Temp: 98 °F (36.7 °C);  Max - Temp  Av.2 °F (36.8 °C)  Min: 98 °F (36.7 °C)  Max: 98.5 °F (36.9 °C)  PULSE OXIMETRY RANGE: SpO2  Av.7 %  Min: 73 %  Max: 99 %  24HR INTAKE/OUTPUT:    Intake/Output Summary (Last 24 hours) at 2022 0909  Last data filed at 2022 0552  Gross per 24 hour   Intake 250 ml   Output 800 ml   Net -550 ml     Not performed given active covid        Data  Recent Labs     22  0401 11/16/22  0519 11/15/22  05   WBC 20.2* 19.0* 17.5*   NEUTROABS 13.3* 12.4* 8.8*   LYMPHOPCT 29.0 32.0 46.0   RBC 4.42 4.71 4.63   HGB 11.7* 12.6* 12.6*   HCT 37.0* 39.5* 38.7*   MCV 83.7 83.9 83.5   MCH 26.4 26.6 27.2   MCHC 31.5 31.8 32.6   RDW 17.3* 18.1* 17.6*    150 165        Recent Labs     11/15/22  0540 22  0519 22  0401    140 139   K 4.8 4.8 4.8    107 107   CO2    PHOS 2.6  --   --    BUN 48* 66* 68*   CREATININE 1.3 1.6* 1.8*     Recent Labs     11/15/22  0540 22  0519 22  0401   AST 41* 34 36   ALT 59* 48* 39   BILITOT 0.6 0.6 0.6   ALKPHOS 56 57 56       Magnesium:    Lab Results   Component Value Date/Time    MG 2.70 2022 04:01 AM    MG 2.80 2022 05:19 AM    MG 2.50 11/15/2022 05:40 AM       Imaging  XR CHEST PORTABLE  Narrative: EXAMINATION:  ONE XRAY VIEW OF THE CHEST    2022 6:13 am    COMPARISON:  2022 radiograph    HISTORY:  ORDERING SYSTEM PROVIDED HISTORY: evaluate for infiltrates  TECHNOLOGIST PROVIDED HISTORY:  Reason for exam:->evaluate for infiltrates  Reason for Exam: Spoke with patient about below. She will discontinue lisinopril and will start losartan.      Ara Bradley on 5/1/2023 at 8:50 AM    Christal Monteiro MD  You 13 hours ago (7:01 PM)     HA  Please ask her to stop lisinopril and start losratan. I sent a prescription to her pharmacy.   Thank you         evaluate for infiltrates    FINDINGS:  The heart is enlarged. Mediastinum and pulmonary vascular markings are  normal.  Multifocal ground-glass airspace opacities are observed diffusely  throughout both lungs. Since the prior exam, overall pattern has slightly  progressed. No overt pleural fluid. No pneumothorax. No skeletal finding. Impression: Slight increase in diffuse pattern of ground-glass airspace opacification  bilaterally. Problem List  Patient Active Problem List   Diagnosis    Kidney stones    Atherosclerosis of native artery of extremity with intermittent claudication (HCC)    Pure hypercholesterolemia    History of malignant neoplasm of prostate    PAF (paroxysmal atrial fibrillation) (HCC)    Nonrheumatic aortic valve insufficiency    Abnormal stress test    Coronary artery disease due to lipid rich plaque    Primary hypertension    Vitamin D deficiency    Obesity (BMI 30-39. 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)    Panlobular emphysema (HCC)    Chronic renal disease, stage III ([address] [032856]    Septicemia (HCC)    Acute respiratory failure with hypoxia (HCC)    Atrial flutter by electrocardiogram ([address]    History of chronic lymphocytic leukemia    COVID-19    Community acquired pneumonia    ROSIE (acute kidney injury) ([address]    CLL (chronic lymphocytic leukemia) ([address]    Sepsis with acute hypoxic respiratory failure without septic shock (HCC)    Typical atrial flutter (HCC)    ARDS (adult respiratory distress syndrome) (HCC)    Moderate malnutrition ([address]       ASSESSMENT AND PLAN:    1. Chronic lymphocytic leukemia:     -Diagnosed in February 2021  -Has been on ibrutinib for 420 mg p.o. daily.  Will hold ibrutinib due to new onset a-fib/a-flutter. Resume at discharge.   -White count at baseline - 15-17K  - WBC up to 22.0, steroids are likely contributing. 2.  Acute hypoxic respiratory failure due to COVID-19 pneumonia:     -Management per primary/ICU team     3. atrial flutter:     -Management per cardiology. 4. ROSIE on CKD 3:     -Nephrology following.  -Now resolved.       Sacha Wray CNP        Please contact through [employer]         Clinically worsening, code status changed to [facility]    Moises Hernández MD  [facility]